# Patient Record
Sex: MALE | Race: WHITE | Employment: OTHER | URBAN - NONMETROPOLITAN AREA
[De-identification: names, ages, dates, MRNs, and addresses within clinical notes are randomized per-mention and may not be internally consistent; named-entity substitution may affect disease eponyms.]

---

## 2017-04-24 ENCOUNTER — OFFICE VISIT (OUTPATIENT)
Dept: PULMONOLOGY | Age: 82
End: 2017-04-24

## 2017-04-24 VITALS
BODY MASS INDEX: 22.28 KG/M2 | TEMPERATURE: 99.1 F | HEART RATE: 64 BPM | SYSTOLIC BLOOD PRESSURE: 130 MMHG | WEIGHT: 150.4 LBS | HEIGHT: 69 IN | DIASTOLIC BLOOD PRESSURE: 60 MMHG | OXYGEN SATURATION: 95 %

## 2017-04-24 DIAGNOSIS — R93.89 ABNORMAL CHEST X-RAY: ICD-10-CM

## 2017-04-24 DIAGNOSIS — J18.9 PNEUMONIA OF LEFT LOWER LOBE DUE TO INFECTIOUS ORGANISM: Primary | ICD-10-CM

## 2017-04-24 PROCEDURE — 99213 OFFICE O/P EST LOW 20 MIN: CPT | Performed by: INTERNAL MEDICINE

## 2017-04-24 PROCEDURE — 4040F PNEUMOC VAC/ADMIN/RCVD: CPT | Performed by: INTERNAL MEDICINE

## 2017-04-24 PROCEDURE — 1036F TOBACCO NON-USER: CPT | Performed by: INTERNAL MEDICINE

## 2017-04-24 PROCEDURE — 1123F ACP DISCUSS/DSCN MKR DOCD: CPT | Performed by: INTERNAL MEDICINE

## 2017-04-24 PROCEDURE — G8419 CALC BMI OUT NRM PARAM NOF/U: HCPCS | Performed by: INTERNAL MEDICINE

## 2017-04-24 PROCEDURE — G8427 DOCREV CUR MEDS BY ELIG CLIN: HCPCS | Performed by: INTERNAL MEDICINE

## 2017-04-24 PROCEDURE — G8598 ASA/ANTIPLAT THER USED: HCPCS | Performed by: INTERNAL MEDICINE

## 2017-09-14 ENCOUNTER — TELEPHONE (OUTPATIENT)
Dept: ADMINISTRATIVE | Age: 82
End: 2017-09-14

## 2017-12-18 ENCOUNTER — HOSPITAL ENCOUNTER (EMERGENCY)
Dept: GENERAL RADIOLOGY | Age: 82
Discharge: HOME | End: 2017-12-18
Payer: MEDICARE

## 2017-12-18 ENCOUNTER — HOSPITAL ENCOUNTER (EMERGENCY)
Age: 82
Discharge: HOME | End: 2017-12-18
Payer: MEDICARE

## 2017-12-18 VITALS
OXYGEN SATURATION: 94 % | TEMPERATURE: 97.4 F | DIASTOLIC BLOOD PRESSURE: 55 MMHG | BODY MASS INDEX: 20.62 KG/M2 | HEIGHT: 69 IN | RESPIRATION RATE: 16 BRPM | WEIGHT: 139.25 LBS | HEART RATE: 78 BPM | SYSTOLIC BLOOD PRESSURE: 108 MMHG

## 2017-12-18 DIAGNOSIS — J40 BRONCHITIS: Primary | ICD-10-CM

## 2017-12-18 DIAGNOSIS — R05.9 COUGH: ICD-10-CM

## 2017-12-18 PROCEDURE — 99214 OFFICE O/P EST MOD 30 MIN: CPT | Performed by: NURSE PRACTITIONER

## 2017-12-18 PROCEDURE — 71020 XR CHEST STANDARD TWO VW: CPT

## 2017-12-18 PROCEDURE — 99213 OFFICE O/P EST LOW 20 MIN: CPT

## 2017-12-18 RX ORDER — LEVOFLOXACIN 500 MG/1
500 TABLET, FILM COATED ORAL DAILY
Qty: 10 TABLET | Refills: 0 | Status: SHIPPED | OUTPATIENT
Start: 2017-12-18 | End: 2017-12-28

## 2017-12-18 RX ORDER — PREDNISONE 10 MG/1
10 TABLET ORAL DAILY
Qty: 5 TABLET | Refills: 0 | Status: SHIPPED | OUTPATIENT
Start: 2017-12-18 | End: 2017-12-23

## 2017-12-18 RX ORDER — ALBUTEROL SULFATE 90 UG/1
2 AEROSOL, METERED RESPIRATORY (INHALATION) EVERY 6 HOURS PRN
Qty: 1 INHALER | Refills: 1 | Status: SHIPPED | OUTPATIENT
Start: 2017-12-18 | End: 2020-03-02 | Stop reason: ALTCHOICE

## 2017-12-18 ASSESSMENT — ENCOUNTER SYMPTOMS
RHINORRHEA: 1
ABDOMINAL DISTENTION: 0
WHEEZING: 1
CONSTIPATION: 0
COUGH: 1
SHORTNESS OF BREATH: 1
COLOR CHANGE: 0
ANAL BLEEDING: 0
DIARRHEA: 0
BLOOD IN STOOL: 0
EYE REDNESS: 0
NAUSEA: 0
EYE DISCHARGE: 0
ABDOMINAL PAIN: 0
SORE THROAT: 0

## 2017-12-18 NOTE — ED TRIAGE NOTES
Pt to room 4 with c/o chest congestion, nasal congestion, and non productive cough. No known exposure.

## 2017-12-18 NOTE — ED PROVIDER NOTES
Loy August [age]  Urgent Care Encounter      CHIEF COMPLAINT       Chief Complaint   Patient presents with    Chest Congestion    Nasal Congestion    Cough     Nurses Notes reviewed and I agree except as noted in the HPI. HISTORY OF PRESENT ILLNESS   Jordan Hoffman is a [de-identified] y.o. male who presents With a 5 day history of a wet cough, productive of yellow and sometimes red mucus, chest congestion, wheezing, decreased appetite. Patient denies fever. Patient is a nonsmoker. Reports that he has had pneumonia 3 times in the past, last episode about a year ago. REVIEW OF SYSTEMS     Review of Systems   Constitutional: Positive for appetite change (decreased appetite ) and fatigue. Negative for chills and fever. HENT: Positive for congestion, postnasal drip and rhinorrhea. Negative for ear pain and sore throat. Eyes: Negative for discharge and redness. Respiratory: Positive for cough, shortness of breath and wheezing. Cardiovascular: Negative for chest pain and leg swelling. Gastrointestinal: Negative for abdominal distention, abdominal pain, anal bleeding, blood in stool, constipation, diarrhea and nausea. Skin: Negative for color change and rash. Neurological: Negative for facial asymmetry, speech difficulty and weakness. Hematological: Does not bruise/bleed easily. Psychiatric/Behavioral: Negative for agitation and confusion. PAST MEDICAL HISTORY         Diagnosis Date    Carotid stenosis, left     Cataract     Cataracts, bilateral     COPD (chronic obstructive pulmonary disease) ([address] 12/6/2016    Esophageal abnormality     GERD (gastroesophageal reflux disease)     H/O blood clots     Hearing loss     Hyperlipidemia     Hypertension 4/13/2014    Macular degeneration     Pneumonia     Skin cancer     removal    Stroke ([address]     TIA (transient ischemic attack)     Ulcer ([address]     Unspecified cerebral artery occlusion with cerebral infarction NOV. 2013  Unspecified sleep apnea     Wears dentures     FULL UPPER       SURGICAL HISTORY     Patient  has a past surgical history that includes Skin cancer excision; hc cath power picc triple (11/1/2013); and Carotid endarterectomy (01/13/2014). CURRENT MEDICATIONS       Previous Medications    ATORVASTATIN (LIPITOR) 20 MG TABLET    Take 20 mg by mouth daily. CLOPIDOGREL (PLAVIX) 75 MG TABLET    Take 1 po qd    FUROSEMIDE (LASIX) 40 MG TABLET    Take 0.5 tablets by mouth daily    LISINOPRIL (PRINIVIL;ZESTRIL) 5 MG TABLET    Take 1 tablet by mouth daily. METOPROLOL (LOPRESSOR) 25 MG TABLET    Take 1 tablet by mouth 2 times daily. MULTIPLE VITAMIN (MULTI-DAY VITAMINS PO)    Take 1 tablet by mouth daily. PANTOPRAZOLE (PROTONIX) 40 MG TABLET    Take 40 mg by mouth daily. POTASSIUM CHLORIDE (KLOR-CON M) 10 MEQ EXTENDED RELEASE TABLET    Take 1 tablet by mouth daily       ALLERGIES     Patient is has No Known Allergies. FAMILY HISTORY     Patient's family history includes Stroke in his mother. SOCIAL HISTORY     Patient  reports that he quit smoking about 17 years ago. His smoking use included Cigarettes. He started smoking about 66 years ago. He has a 80.00 pack-year smoking history. He has never used smokeless tobacco. He reports that he does not drink alcohol or use drugs. PHYSICAL EXAM     ED TRIAGE VITALS  BP: (!) 108/55, Temp: 97.4 °F (36.3 °C), Pulse: 78, Resp: 16, SpO2: 94 %  Physical Exam   Constitutional: He is oriented to person, place, and time. Vital signs are normal. He appears well-developed and well-nourished. He appears ill. No distress. HENT:   Head: Normocephalic and atraumatic. Right Ear: Hearing, tympanic membrane, external ear and ear canal normal. No drainage, swelling or tenderness. No decreased hearing is noted. Left Ear: Hearing, tympanic membrane, external ear and ear canal normal. No drainage, swelling or tenderness. No decreased hearing is noted.    Nose: Mucosal edema present. Mouth/Throat: Uvula is midline and mucous membranes are normal. Posterior oropharyngeal erythema present. Eyes: Conjunctivae and EOM are normal. Pupils are equal, round, and reactive to light. Right eye exhibits no discharge. Left eye exhibits no discharge. No scleral icterus. Neck: Normal range of motion. No thyromegaly present. Cardiovascular: Normal rate, regular rhythm and normal heart sounds. No murmur heard. Pulmonary/Chest: Effort normal. No accessory muscle usage. No respiratory distress. He has no decreased breath sounds. He has no wheezes. He has rhonchi (throughout). He has no rales. Abdominal: Soft. Bowel sounds are normal. He exhibits no distension. There is no hepatosplenomegaly. There is no tenderness. There is no CVA tenderness. Musculoskeletal: He exhibits no edema. Lymphadenopathy:        Head (right side): No submental, no submandibular, no tonsillar, no preauricular and no posterior auricular adenopathy present. Head (left side): No submental, no submandibular, no tonsillar, no preauricular and no posterior auricular adenopathy present. He has no cervical adenopathy. Right: No supraclavicular adenopathy present. Left: No supraclavicular adenopathy present. Neurological: He is alert and oriented to person, place, and time. No cranial nerve deficit. Skin: Skin is warm and dry. No rash noted. He is not diaphoretic. No erythema. Psychiatric: He has a normal mood and affect. His behavior is normal. Judgment and thought content normal. Cognition and memory are normal.   Nursing note and vitals reviewed. DIAGNOSTIC RESULTS   Labs:No results found for this visit on 12/18/17. IMAGING:    XR CHEST STANDARD (2 VW)   Final Result   1. Vascular clips left side of neck from prior surgery. Lungs very well inflated. 2. No acute infiltrates or effusions are seen.  Heart size normal.            **This report has been created using voice recognition software. It may contain minor errors which are inherent in voice recognition technology. **      Final report electronically signed by Dr. Jane Treviño on 12/18/2017 1:18 PM        URGENT CARE COURSE:     Decision to Discharge nontoxic, well-hydrated, normal airway. Patient is afebrile and stable. No airway abscess or epiglottitis, sepsis, CNS infection, pneumonia, hypoxia, bronchospasm. No ACS, CHF, PE    Patient was advised that the signs and symptoms are consistent with Bronchitis Will treat with Levaquin, Prednisone, and Albuterol. Can also use Tylenol and Motrin as directed for fever and pain. Frequent handwashing, He can do the nasal saline wash - Kimberly Pot for sinus congestion  Rest as much as possible  increased oral clear liquids, vaporizer, rest.       Avoid tobacco exposure,Take medication as directed,Drink Lots of fluids. Advised to follow up with family doctor in the next 5-7 days for reevaluation. The patient may return to urgent care if does not get better or symptoms worsen. Patient is advised to go to ER immediately if present symptoms worsen, high fever >102, vomiting, breathing difficulty, chest pain, lethargy or new symptoms develop. Patient understands this approach of home management and agrees to the treatment plan. The patient  was advised that at this point the patient can be treated safely at home and should be aware of following interventions and advised to the watch for the following:  #1. Any increasing pain not controlled with Motrin or Tylenol. #2. Any development of drainage from the ears redness of the auricle or posterior ear. #3. Development of the any fever chills, headache or stiffness of the neck the patient needs to be reevaluated by the primary care provider, return here or go to the emergency department for reevaluation. The patient is agreeable to the outpatient management at this time.  They are advised to follow-up with her primary care

## 2018-12-21 LAB
CHOLESTEROL, TOTAL: 121 MG/DL
CHOLESTEROL/HDL RATIO: NORMAL
HDLC SERPL-MCNC: 35 MG/DL (ref 35–70)
LDL CHOLESTEROL CALCULATED: 86 MG/DL (ref 0–160)
TRIGL SERPL-MCNC: 80 MG/DL
VLDLC SERPL CALC-MCNC: NORMAL MG/DL

## 2019-12-09 ENCOUNTER — HOSPITAL ENCOUNTER (OUTPATIENT)
Age: 84
Discharge: HOME | End: 2019-12-09
Payer: MEDICARE

## 2019-12-09 LAB
BASOPHILS # BLD: 1.3 %
BASOPHILS ABSOLUTE: 0.1 THOU/MM3 (ref 0–0.1)
EOSINOPHIL # BLD: 1.3 %
EOSINOPHILS ABSOLUTE: 0.1 THOU/MM3 (ref 0–0.4)
ERYTHROCYTE [DISTWIDTH] IN BLOOD BY AUTOMATED COUNT: 15.5 % (ref 11.5–14.5)
ERYTHROCYTE [DISTWIDTH] IN BLOOD BY AUTOMATED COUNT: 52.7 FL (ref 35–45)
HCT VFR BLD CALC: 41.9 % (ref 42–52)
HEMOGLOBIN: 12.6 GM/DL (ref 14–18)
IMMATURE GRANS (ABS): 0.09 THOU/MM3 (ref 0–0.07)
IMMATURE GRANULOCYTES: 1.9 %
LYMPHOCYTES # BLD: 47.7 %
LYMPHOCYTES ABSOLUTE: 2.3 THOU/MM3 (ref 1–4.8)
MCH RBC QN AUTO: 27.8 PG (ref 26–33)
MCHC RBC AUTO-ENTMCNC: 30.1 GM/DL (ref 32.2–35.5)
MCV RBC AUTO: 92.5 FL (ref 80–94)
MONOCYTES # BLD: 33 %
MONOCYTES ABSOLUTE: 1.6 THOU/MM3 (ref 0.4–1.3)
NUCLEATED RED BLOOD CELLS: 0 /100 WBC
PLATELET # BLD: 190 THOU/MM3 (ref 130–400)
PMV BLD AUTO: 12.9 FL (ref 9.4–12.4)
RBC # BLD: 4.53 MILL/MM3 (ref 4.7–6.1)
SEG NEUTROPHILS: 14.8 %
SEGMENTED NEUTROPHILS ABSOLUTE COUNT: 0.7 THOU/MM3 (ref 1.8–7.7)
WBC # BLD: 4.8 THOU/MM3 (ref 4.8–10.8)

## 2019-12-09 PROCEDURE — 85025 COMPLETE CBC W/AUTO DIFF WBC: CPT

## 2019-12-09 PROCEDURE — 36415 COLL VENOUS BLD VENIPUNCTURE: CPT

## 2020-03-02 ENCOUNTER — OFFICE VISIT (OUTPATIENT)
Dept: FAMILY MEDICINE CLINIC | Age: 85
End: 2020-03-02
Payer: MEDICARE

## 2020-03-02 ENCOUNTER — TELEPHONE (OUTPATIENT)
Dept: FAMILY MEDICINE CLINIC | Age: 85
End: 2020-03-02

## 2020-03-02 VITALS
HEIGHT: 68 IN | SYSTOLIC BLOOD PRESSURE: 124 MMHG | DIASTOLIC BLOOD PRESSURE: 68 MMHG | RESPIRATION RATE: 12 BRPM | BODY MASS INDEX: 20.31 KG/M2 | WEIGHT: 134 LBS | TEMPERATURE: 97.9 F | HEART RATE: 60 BPM

## 2020-03-02 PROBLEM — D46.9 MYELODYSPLASTIC SYNDROME (HCC): Status: ACTIVE | Noted: 2020-03-02

## 2020-03-02 PROBLEM — E72.11 HYPERHOMOCYSTEINEMIA (HCC): Status: ACTIVE | Noted: 2020-03-02

## 2020-03-02 PROBLEM — K21.9 GASTROESOPHAGEAL REFLUX DISEASE: Status: ACTIVE | Noted: 2020-03-02

## 2020-03-02 PROCEDURE — 1036F TOBACCO NON-USER: CPT | Performed by: FAMILY MEDICINE

## 2020-03-02 PROCEDURE — G8420 CALC BMI NORM PARAMETERS: HCPCS | Performed by: FAMILY MEDICINE

## 2020-03-02 PROCEDURE — 99203 OFFICE O/P NEW LOW 30 MIN: CPT | Performed by: FAMILY MEDICINE

## 2020-03-02 PROCEDURE — 4040F PNEUMOC VAC/ADMIN/RCVD: CPT | Performed by: FAMILY MEDICINE

## 2020-03-02 PROCEDURE — G8484 FLU IMMUNIZE NO ADMIN: HCPCS | Performed by: FAMILY MEDICINE

## 2020-03-02 PROCEDURE — 1123F ACP DISCUSS/DSCN MKR DOCD: CPT | Performed by: FAMILY MEDICINE

## 2020-03-02 PROCEDURE — G8427 DOCREV CUR MEDS BY ELIG CLIN: HCPCS | Performed by: FAMILY MEDICINE

## 2020-03-02 RX ORDER — PANTOPRAZOLE SODIUM 40 MG/1
40 TABLET, DELAYED RELEASE ORAL DAILY
Qty: 90 TABLET | Refills: 3 | Status: SHIPPED | OUTPATIENT
Start: 2020-03-02 | End: 2020-11-23 | Stop reason: ALTCHOICE

## 2020-03-02 RX ORDER — FOLIC ACID 1 MG/1
1 TABLET ORAL DAILY
Qty: 90 TABLET | Refills: 3 | Status: SHIPPED | OUTPATIENT
Start: 2020-03-02 | End: 2021-04-16 | Stop reason: SDUPTHER

## 2020-03-02 RX ORDER — LISINOPRIL 5 MG/1
5 TABLET ORAL DAILY
Qty: 90 TABLET | Refills: 3 | Status: SHIPPED | OUTPATIENT
Start: 2020-03-02 | End: 2021-03-23

## 2020-03-02 RX ORDER — FOLIC ACID 1 MG/1
1 TABLET ORAL DAILY
COMMUNITY
End: 2020-03-02 | Stop reason: SDUPTHER

## 2020-03-02 RX ORDER — CLOPIDOGREL BISULFATE 75 MG/1
TABLET ORAL
Qty: 90 TABLET | Refills: 3 | Status: ON HOLD | OUTPATIENT
Start: 2020-03-02 | End: 2020-07-15

## 2020-03-02 ASSESSMENT — ENCOUNTER SYMPTOMS
BLOOD IN STOOL: 0
SHORTNESS OF BREATH: 0
ANAL BLEEDING: 0
VOMITING: 0
NAUSEA: 0
CHEST TIGHTNESS: 0
DIARRHEA: 0
CONSTIPATION: 0
ABDOMINAL PAIN: 0

## 2020-03-02 ASSESSMENT — PATIENT HEALTH QUESTIONNAIRE - PHQ9
2. FEELING DOWN, DEPRESSED OR HOPELESS: 0
SUM OF ALL RESPONSES TO PHQ QUESTIONS 1-9: 0
1. LITTLE INTEREST OR PLEASURE IN DOING THINGS: 0
SUM OF ALL RESPONSES TO PHQ QUESTIONS 1-9: 0
SUM OF ALL RESPONSES TO PHQ9 QUESTIONS 1 & 2: 0

## 2020-03-02 NOTE — TELEPHONE ENCOUNTER
DOLMARCELA today.   Pt had a whooping cough vaccine 10/18/18 at [address] [place] [address]

## 2020-03-02 NOTE — PATIENT INSTRUCTIONS
Encouraged annual FLU VACCINE- pt declines (updated 3/2/2020)  Pt/ wife to track down date of previous TETANUS SHOT (TdaP/ ADACEL/ BOOSTRIX) and update office with info. Encouraged NEW SHINGLES SHOT [facility])- pt declines at this time.  (updated 3/2/2020)  Will hold on RAUL/ PSA at this time due to age and pt preference. COLONOSCOPY done in past per Dr. Riri Palomares- will track down report. After discussion with pt and wife, will refer to Dr. Derick Braun at this time to [address] [place] [address] for history of Myelodysplastic Syndrome. Check FLP and BMP at this time. Continue current medicines  Refills. Check Carotid Dopplers at this time. Follow up in 3 months for re-evaluation.

## 2020-03-02 NOTE — PROGRESS NOTES
[facility]  [facility]  Dept: [phone]  Dept Fax: [phone]  MAIA Ball is a 82 y.o.male    Pt presents to establish PCP- previous provider was Gerard Elizabeth CNP. Date last seen by provider- 14-15 months ago. Pt seeing Dr. Deneen Sanchez at [facility] currently for history of Myelodysplastic Syndrome. Current medical problems include:  Patient Active Problem List   Diagnosis    Brain TIA    Cerebral infarction ([address]    Hemiparesis, right ([address]    Hypertension    History of CVA (cerebrovascular accident)    Hyperhomocysteinemia ([address]    Gastroesophageal reflux disease    Myelodysplastic syndrome ([address]       Past Medical History:   Diagnosis Date    Carotid stenosis, left     Cataract     Cataracts, bilateral     Esophageal abnormality     GERD (gastroesophageal reflux disease)     H/O blood clots     Hearing loss     Hyperlipidemia     Hypertension 4/13/2014    Macular degeneration     Pneumonia     Skin cancer     removal    Stroke ([address]     TIA (transient ischemic attack)     Ulcer     Unspecified cerebral artery occlusion with cerebral infarction NOV. 2013    Unspecified sleep apnea     Wears dentures     FULL UPPER       Past Surgical History:   Procedure Laterality Date    [facility]         SKIN CANCER EXCISION  2010    Basal Cell Carcinoma- Dr. Elsa Richards       No Known Allergies       Current Outpatient Medications:     folic acid (FOLVITE) 1 MG tablet, Take 1 tablet by mouth daily, Disp: 90 tablet, Rfl: 3    clopidogrel (PLAVIX) 75 MG tablet, Take 1 po qd, Disp: 90 tablet, Rfl: 3    lisinopril (PRINIVIL;ZESTRIL) 5 MG tablet, Take 1 tablet by mouth daily, Disp: 90 tablet, Rfl: 3    metoprolol tartrate (LOPRESSOR) 25 MG tablet, Take 1 tablet by mouth 2 times daily, Disp: 180 tablet, Rfl: 3    pantoprazole (PROTONIX) 40 MG tablet, Take 1 pantoprazole (PROTONIX) 40 MG tablet   2. Hyperhomocysteinemia (HCC)  folic acid (FOLVITE) 1 MG tablet   3. Myelodysplastic syndrome [facility])  Ole Carter MD, Oncology, [facility] [address]   4. Hyperlipidemia, unspecified hyperlipidemia type     5. Cerebral infarction, unspecified mechanism (HCC)  clopidogrel (PLAVIX) 75 MG tablet   6. Essential hypertension  lisinopril (PRINIVIL;ZESTRIL) 5 MG tablet    metoprolol tartrate (LOPRESSOR) 25 MG tablet    Lipid Panel    Basic Metabolic Panel   7. Right carotid bruit  VL DUP CAROTID BILATERAL     PLAN     Encouraged annual FLU VACCINE- pt declines (updated 3/2/2020)  Pt/ wife to track down date of previous TETANUS SHOT (TdaP/ ADACEL/ BOOSTRIX) and update office with info. Encouraged NEW SHINGLES SHOT [facility])- pt declines at this time.  (updated 3/2/2020)  Will hold on RAUL/ PSA at this time due to age and pt preference. COLONOSCOPY done in past per Dr. Tidwell June- will track down report. After discussion with pt and wife, will refer to Dr. Jonathan Duval at this time to [address] [place] [address] for history of Myelodysplastic Syndrome. Check FLP and BMP at this time. Continue current medicines  Refills. Check Carotid Dopplers at this time. Follow up in 3 months for re-evaluation.          Electronically signed by Broderick Colindres MD on 3/2/2020 at 12:20 PM

## 2020-03-09 ENCOUNTER — HOSPITAL ENCOUNTER (OUTPATIENT)
Dept: INTERVENTIONAL RADIOLOGY/VASCULAR | Age: 85
Discharge: HOME | End: 2020-03-09
Payer: MEDICARE

## 2020-03-09 PROCEDURE — 93880 EXTRACRANIAL BILAT STUDY: CPT

## 2020-03-10 ENCOUNTER — TELEPHONE (OUTPATIENT)
Dept: FAMILY MEDICINE CLINIC | Age: 85
End: 2020-03-10

## 2020-03-11 NOTE — TELEPHONE ENCOUNTER
Spoke to the pts wife and she is agreeable to the referral to Dr. Kris Barney. Referral placed in Epic, pts wife will call to schedule.

## 2020-03-11 NOTE — TELEPHONE ENCOUNTER
Spoke to the pts wife and notified her of the ultrasound result and ES recommendations. She stated she is agreeable to the referral to cardiothoracic surgery but prefers to have a physician that is affiliated with [address] [place] [address] so the pt can go there for any procedures. Please advise if there are any other options. (Called Dr. Lynn Handler office and verified that he doesn't have privileges at [address] [place] [address]).

## 2020-03-19 ENCOUNTER — TELEPHONE (OUTPATIENT)
Dept: CARDIOTHORACIC SURGERY | Age: 85
End: 2020-03-19

## 2020-03-19 NOTE — TELEPHONE ENCOUNTER
Dr. Brittany Huertas requested that patient have CTA Neck prior to appt on 3/30/20 due to severe carotid stenosis dx. CTA Neck scheduled for 3/25/20 at 1:20 at [facility]. I informed patient's wife, Sue Capone, and she voiced understanding.

## 2020-03-25 ENCOUNTER — HOSPITAL ENCOUNTER (OUTPATIENT)
Dept: CT IMAGING | Age: 85
Discharge: HOME | End: 2020-03-25
Payer: MEDICARE

## 2020-03-25 LAB — POC CREATININE WHOLE BLOOD: 1.5 MG/DL (ref 0.5–1.2)

## 2020-03-25 PROCEDURE — 6360000004 HC RX CONTRAST MEDICATION: Performed by: THORACIC SURGERY (CARDIOTHORACIC VASCULAR SURGERY)

## 2020-03-25 PROCEDURE — 70498 CT ANGIOGRAPHY NECK: CPT

## 2020-03-25 PROCEDURE — 82565 ASSAY OF CREATININE: CPT

## 2020-03-25 RX ADMIN — IOPAMIDOL 90 ML: 755 INJECTION, SOLUTION INTRAVENOUS at 14:14

## 2020-03-27 ENCOUNTER — HOSPITAL ENCOUNTER (OUTPATIENT)
Age: 85
Discharge: HOME | End: 2020-03-27
Payer: MEDICARE

## 2020-03-27 LAB
CREAT SERPL-MCNC: 1.7 MG/DL (ref 0.4–1.2)
GFR SERPL CREATININE-BSD FRML MDRD: 39 ML/MIN/1.73M2

## 2020-03-27 PROCEDURE — 36415 COLL VENOUS BLD VENIPUNCTURE: CPT

## 2020-03-27 PROCEDURE — 82565 ASSAY OF CREATININE: CPT

## 2020-05-04 ENCOUNTER — TELEPHONE (OUTPATIENT)
Dept: CARDIOTHORACIC SURGERY | Age: 85
End: 2020-05-04

## 2020-05-04 ENCOUNTER — OFFICE VISIT (OUTPATIENT)
Dept: CARDIOTHORACIC SURGERY | Age: 85
End: 2020-05-04
Payer: MEDICARE

## 2020-05-04 VITALS
HEIGHT: 68 IN | DIASTOLIC BLOOD PRESSURE: 72 MMHG | WEIGHT: 134.26 LBS | BODY MASS INDEX: 20.35 KG/M2 | SYSTOLIC BLOOD PRESSURE: 174 MMHG | TEMPERATURE: 96.9 F | HEART RATE: 54 BPM

## 2020-05-04 PROCEDURE — 1123F ACP DISCUSS/DSCN MKR DOCD: CPT | Performed by: THORACIC SURGERY (CARDIOTHORACIC VASCULAR SURGERY)

## 2020-05-04 PROCEDURE — 1036F TOBACCO NON-USER: CPT | Performed by: THORACIC SURGERY (CARDIOTHORACIC VASCULAR SURGERY)

## 2020-05-04 PROCEDURE — 99204 OFFICE O/P NEW MOD 45 MIN: CPT | Performed by: THORACIC SURGERY (CARDIOTHORACIC VASCULAR SURGERY)

## 2020-05-04 PROCEDURE — G8420 CALC BMI NORM PARAMETERS: HCPCS | Performed by: THORACIC SURGERY (CARDIOTHORACIC VASCULAR SURGERY)

## 2020-05-04 PROCEDURE — 4040F PNEUMOC VAC/ADMIN/RCVD: CPT | Performed by: THORACIC SURGERY (CARDIOTHORACIC VASCULAR SURGERY)

## 2020-05-04 PROCEDURE — G8427 DOCREV CUR MEDS BY ELIG CLIN: HCPCS | Performed by: THORACIC SURGERY (CARDIOTHORACIC VASCULAR SURGERY)

## 2020-05-04 NOTE — PROGRESS NOTES
Conjunctivae/corneas clear. Neck: Supple, with full range of motion. No jugular venous distention. Trachea midline. Positive for bruit over the right carotid artery. Well-healed scar in the left neck from carotid endarterectomy  Respiratory:  Normal respiratory effort. Clear to auscultation, bilaterally without rales/wheezes/rhonchi. Cardiovascular:  Regular rate and rhythm with normal S1/S2 without murmurs, rubs or gallops. Abdomen: Soft, non-tender, non-distended with normal bowel sounds. Musculoskeletal:  No clubbing, cyanosis or edema bilaterally. Full range of motion without deformity. Skin: Skin color, texture, turgor normal.  No rashes or lesions. Neurologic:   Right arm weakness  Psychiatric:  Alert and oriented, thought content appropriate, normal insight. Capillary Refill: Brisk,< 3 seconds   Peripheral Pulses: +2 palpable, equal bilaterally. Labs:    CBC:  Lab Results   Component Value Date    WBC 4.8 12/09/2019    HGB 12.6 12/09/2019    HCT 41.9 12/09/2019    MCV 92.5 12/09/2019     12/09/2019    PROTIME 10.6 01/13/2014    INR 0.92 12/05/2016     BMP:   Lab Results   Component Value Date     12/07/2016    K 3.4 12/07/2016     12/07/2016    CO2 23 12/07/2016    PHOS 2.3 07/24/2014    BUN 13 12/07/2016    CREATININE 1.7 03/27/2020    MG 2.2 07/24/2014       Imaging  I have reviewed all imaging.       Problem List:  Patient Active Problem List   Diagnosis    Brain TIA    Cerebral infarction ([address]    Hemiparesis, right ([address]    Hypertension    History of CVA (cerebrovascular accident)    Hyperhomocysteinemia ([address]    Gastroesophageal reflux disease    Myelodysplastic syndrome ([address]       Assessment: Severe right carotid artery stenosis, status post left carotid endarterectomy, and status post a left hemispheric CVA with residual right arm weakness    Plan 5/4/20:  1) 2D echocardiogram, EKG, and cardiac stress test for clearance  2) needs clearance for carotid

## 2020-05-11 ENCOUNTER — TELEPHONE (OUTPATIENT)
Dept: CARDIOLOGY CLINIC | Age: 85
End: 2020-05-11

## 2020-05-11 ENCOUNTER — OFFICE VISIT (OUTPATIENT)
Dept: CARDIOLOGY CLINIC | Age: 85
End: 2020-05-11
Payer: MEDICARE

## 2020-05-11 VITALS
HEIGHT: 68 IN | WEIGHT: 140 LBS | SYSTOLIC BLOOD PRESSURE: 120 MMHG | HEART RATE: 58 BPM | DIASTOLIC BLOOD PRESSURE: 68 MMHG | BODY MASS INDEX: 21.22 KG/M2

## 2020-05-11 PROCEDURE — 4040F PNEUMOC VAC/ADMIN/RCVD: CPT | Performed by: NUCLEAR MEDICINE

## 2020-05-11 PROCEDURE — 1123F ACP DISCUSS/DSCN MKR DOCD: CPT | Performed by: NUCLEAR MEDICINE

## 2020-05-11 PROCEDURE — 1036F TOBACCO NON-USER: CPT | Performed by: NUCLEAR MEDICINE

## 2020-05-11 PROCEDURE — 93000 ELECTROCARDIOGRAM COMPLETE: CPT | Performed by: NUCLEAR MEDICINE

## 2020-05-11 PROCEDURE — 99204 OFFICE O/P NEW MOD 45 MIN: CPT | Performed by: NUCLEAR MEDICINE

## 2020-05-11 PROCEDURE — G8427 DOCREV CUR MEDS BY ELIG CLIN: HCPCS | Performed by: NUCLEAR MEDICINE

## 2020-05-11 PROCEDURE — G8420 CALC BMI NORM PARAMETERS: HCPCS | Performed by: NUCLEAR MEDICINE

## 2020-05-11 ASSESSMENT — ENCOUNTER SYMPTOMS
PHOTOPHOBIA: 0
BACK PAIN: 1
COLOR CHANGE: 0
ANAL BLEEDING: 0
DIARRHEA: 0
RECTAL PAIN: 0
NAUSEA: 0
BLOOD IN STOOL: 0
ABDOMINAL DISTENTION: 0
VOMITING: 0
CONSTIPATION: 0
CHEST TIGHTNESS: 0
ABDOMINAL PAIN: 0
SHORTNESS OF BREATH: 1

## 2020-05-11 NOTE — PROGRESS NOTES
Pre-op exam  EKG 12 Lead   2. Familial hypercholesterolemia     3. Essential hypertension     4. PVD (peripheral vascular disease) (HCC)     higher risk for CAD  No established CAD  Here for clearance   ECG in office was done today. I reviewed the ECG. No acute findings    Plan:  No follow-ups on file. Discussed  Non invasive cardiac testing will be ordered to further evaluate for any ischemic or structural heart disease as a cause of the patient symptoms. We will proceed with a Stress Cardiolite test and echo soon. Continue risk factor modification and medical management  Thank you for allowing me to participate in the care of your patient. Please don't hesitate to contact me regarding any further issues related to the patient care    Orders Placed:  Orders Placed This Encounter   Procedures    EKG 12 Lead     Order Specific Question:   Reason for Exam?     Answer: Other       Medications Prescribed:  No orders of the defined types were placed in this encounter. Discussed use, benefit, and side effects of prescribed medications. All patient questions answered. Pt voicedunderstanding. Instructed to continue current medications, diet and exercise. Continue risk factor modification and medical management. Patient agreed with treatment plan. Follow up as directed.     Electronically signedby Renetta Santos MD on 5/11/2020 at 12:16 PM

## 2020-05-11 NOTE — TELEPHONE ENCOUNTER
Justine with :Antonio Bradley-  Patients wife    Patient is scheduled May 21, 2020  Echo at 915am   [address]    Instructions have been mailed to the patient.

## 2020-05-21 ENCOUNTER — HOSPITAL ENCOUNTER (OUTPATIENT)
Dept: NON INVASIVE DIAGNOSTICS | Age: 85
Discharge: HOME | End: 2020-05-21
Payer: MEDICARE

## 2020-05-21 VITALS — WEIGHT: 140 LBS | HEIGHT: 68 IN | BODY MASS INDEX: 21.22 KG/M2

## 2020-05-21 LAB
LV EF: 60 %
LVEF MODALITY: NORMAL

## 2020-05-21 PROCEDURE — 93017 CV STRESS TEST TRACING ONLY: CPT | Performed by: NUCLEAR MEDICINE

## 2020-05-21 PROCEDURE — 3430000000 HC RX DIAGNOSTIC RADIOPHARMACEUTICAL: Performed by: NUCLEAR MEDICINE

## 2020-05-21 PROCEDURE — 93018 CV STRESS TEST I&R ONLY: CPT | Performed by: NUCLEAR MEDICINE

## 2020-05-21 PROCEDURE — 6360000002 HC RX W HCPCS

## 2020-05-21 PROCEDURE — 93016 CV STRESS TEST SUPVJ ONLY: CPT | Performed by: NUCLEAR MEDICINE

## 2020-05-21 PROCEDURE — A9500 TC99M SESTAMIBI: HCPCS | Performed by: NUCLEAR MEDICINE

## 2020-05-21 PROCEDURE — 78452 HT MUSCLE IMAGE SPECT MULT: CPT | Performed by: NUCLEAR MEDICINE

## 2020-05-21 PROCEDURE — 93306 TTE W/DOPPLER COMPLETE: CPT

## 2020-05-21 PROCEDURE — 78452 HT MUSCLE IMAGE SPECT MULT: CPT

## 2020-05-21 RX ADMIN — Medication 9 MILLICURIE: at 10:40

## 2020-05-21 RX ADMIN — Medication 28.5 MILLICURIE: at 12:17

## 2020-05-22 ENCOUNTER — TELEPHONE (OUTPATIENT)
Dept: CARDIOLOGY CLINIC | Age: 85
End: 2020-05-22

## 2020-06-03 ENCOUNTER — TELEPHONE (OUTPATIENT)
Dept: CARDIOTHORACIC SURGERY | Age: 85
End: 2020-06-03

## 2020-07-07 ENCOUNTER — PREP FOR PROCEDURE (OUTPATIENT)
Dept: CARDIOTHORACIC SURGERY | Age: 85
End: 2020-07-07

## 2020-07-07 RX ORDER — SODIUM CHLORIDE 0.9 % (FLUSH) 0.9 %
10 SYRINGE (ML) INJECTION PRN
Status: CANCELLED | OUTPATIENT
Start: 2020-07-07

## 2020-07-07 RX ORDER — SODIUM CHLORIDE 0.9 % (FLUSH) 0.9 %
10 SYRINGE (ML) INJECTION EVERY 12 HOURS SCHEDULED
Status: CANCELLED | OUTPATIENT
Start: 2020-07-07

## 2020-07-08 NOTE — PROGRESS NOTES
PAT call attempted, patient unavailable, left message to please call us back at your earliest convenience; [phone]

## 2020-07-09 NOTE — PROGRESS NOTES
Covid screening questionnaire complete and negative for symptoms or exposure see chart for documentation.   Please limit your exposure to the public after you have your covid test  Please call your doctor immediately if you develop any symptoms of covid prior to your surgery

## 2020-07-09 NOTE — PROGRESS NOTES
PAT call attempted, patient unavailable, left message to please call us back at your earliest convenience; [phone]

## 2020-07-09 NOTE — PROGRESS NOTES
NPO after midnight  [employer] and drivers license  Wear comfortable clean clothing  Do not bring jewelry   Shower night before and morning of surgery with a liquid antibacterial soap  Bring medications in original bottles  Follow all instructions give by your physician   needed at discharge  Call [phone] for any questions

## 2020-07-09 NOTE — PROGRESS NOTES
In preparation for their surgical procedure above patient was screened for Obstructive Sleep Apnea (MIRANDA) using the STOP-Bang Questionnaire by the Pre-Admission Testing department. This is a pre-surgical screening tool for patient safety and serves as a recommendation, this WILL NOT cause cancellation of surgery. STOP-Bang Questionnaire  * Do you currently see a pulmonologist?  No     If yes STOP, do not complete. Patient follows with  .    1. Do you snore loudly (able to be heard in the next room)? No    2. Do you often feel tired or sleepy during the daytime? No       3. Has anyone ever told you that you stop breathing during your sleep? No    4. Do you have or are you being treated for high blood pressure? Yes      5. BMI more than 35? BMI (Calculated): 20.6        No    6. Age over 48 years? 82 y.o. Yes    7. Neck Circumference greater than 17 inches for male or 16 inches for female? Measured           (visits only)            Not Applicable    8. Gender Male? Yes      TOTAL SCORE: 3    MIRANDA - Low Risk : Yes to 0 - 2 questions  MIRANDA - Intermediate Risk : Yes to 3 - 4 questions  MIRANDA - High Risk : Yes to 5 - 8 questions    Adapted from:   STOP Questionnaire: A Tool to Screen Patients for Obstructive Sleep Apnea   BABITA Zhao.C.P.C., Reed Givens, M.B.B.S., Aiyana Kinsey M.D., Essence Thomason. Ashly Paniagua, Ph.D., KAMINI Davidson.B.B.S., Sangeeta Metcalf M.Sc., Ghazala Castillo M.D., Daysi Narayan. RAE MarkC.P.C.    Anesthesiology 2008; 659:811-05 Copyright 2008, the [address] of [employer]esthesiologists, tono 37.   ----------------------------------------------------------------------------------------------------------------

## 2020-07-10 ENCOUNTER — HOSPITAL ENCOUNTER (OUTPATIENT)
Age: 85
Discharge: HOME | End: 2020-07-10
Payer: COMMERCIAL

## 2020-07-10 PROCEDURE — U0002 COVID-19 LAB TEST NON-CDC: HCPCS

## 2020-07-11 LAB
PERFORMING LAB: NORMAL
REPORT: NORMAL
SARS-COV-2: NOT DETECTED

## 2020-07-14 ENCOUNTER — ANESTHESIA EVENT (OUTPATIENT)
Dept: OPERATING ROOM | Age: 85
DRG: 037 | End: 2020-07-14
Payer: MEDICARE

## 2020-07-15 ENCOUNTER — HOSPITAL ENCOUNTER (INPATIENT)
Age: 85
LOS: 1 days | Discharge: HOME | DRG: 037 | End: 2020-07-16
Attending: THORACIC SURGERY (CARDIOTHORACIC VASCULAR SURGERY) | Admitting: THORACIC SURGERY (CARDIOTHORACIC VASCULAR SURGERY)
Payer: MEDICARE

## 2020-07-15 ENCOUNTER — ANESTHESIA (OUTPATIENT)
Dept: OPERATING ROOM | Age: 85
DRG: 037 | End: 2020-07-15
Payer: MEDICARE

## 2020-07-15 VITALS
DIASTOLIC BLOOD PRESSURE: 57 MMHG | SYSTOLIC BLOOD PRESSURE: 121 MMHG | TEMPERATURE: 93.7 F | RESPIRATION RATE: 2 BRPM | OXYGEN SATURATION: 100 %

## 2020-07-15 PROBLEM — Z86.79 H/O CAROTID STENOSIS: Status: ACTIVE | Noted: 2020-07-15

## 2020-07-15 LAB
ABO: NORMAL
ANION GAP SERPL CALCULATED.3IONS-SCNC: 11 MEQ/L (ref 8–16)
ANTIBODY SCREEN: NORMAL
APTT: 29.2 SECONDS (ref 22–38)
BUN BLDV-MCNC: 29 MG/DL (ref 7–22)
CALCIUM SERPL-MCNC: 9.3 MG/DL (ref 8.5–10.5)
CHLORIDE BLD-SCNC: 108 MEQ/L (ref 98–111)
CO2: 19 MEQ/L (ref 23–33)
CREAT SERPL-MCNC: 1.8 MG/DL (ref 0.4–1.2)
ERYTHROCYTE [DISTWIDTH] IN BLOOD BY AUTOMATED COUNT: 15.9 % (ref 11.5–14.5)
ERYTHROCYTE [DISTWIDTH] IN BLOOD BY AUTOMATED COUNT: 51.6 FL (ref 35–45)
GFR SERPL CREATININE-BSD FRML MDRD: 36 ML/MIN/1.73M2
GLUCOSE BLD-MCNC: 102 MG/DL (ref 70–108)
HCT VFR BLD CALC: 41.5 % (ref 42–52)
HEMOGLOBIN: 13 GM/DL (ref 14–18)
INR BLD: 0.98 (ref 0.85–1.13)
MCH RBC QN AUTO: 27.5 PG (ref 26–33)
MCHC RBC AUTO-ENTMCNC: 31.3 GM/DL (ref 32.2–35.5)
MCV RBC AUTO: 87.9 FL (ref 80–94)
MRSA SCREEN RT-PCR: NEGATIVE
PLATELET # BLD: 123 THOU/MM3 (ref 130–400)
PMV BLD AUTO: 12.5 FL (ref 9.4–12.4)
POTASSIUM REFLEX MAGNESIUM: 4.7 MEQ/L (ref 3.5–5.2)
RBC # BLD: 4.72 MILL/MM3 (ref 4.7–6.1)
RH FACTOR: NORMAL
SODIUM BLD-SCNC: 138 MEQ/L (ref 135–145)
VANCOMYCIN RESISTANT ENTEROCOCCUS: NEGATIVE
WBC # BLD: 4.8 THOU/MM3 (ref 4.8–10.8)

## 2020-07-15 PROCEDURE — 03CH0ZZ EXTIRPATION OF MATTER FROM RIGHT COMMON CAROTID ARTERY, OPEN APPROACH: ICD-10-PCS | Performed by: THORACIC SURGERY (CARDIOTHORACIC VASCULAR SURGERY)

## 2020-07-15 PROCEDURE — 87086 URINE CULTURE/COLONY COUNT: CPT

## 2020-07-15 PROCEDURE — 6370000000 HC RX 637 (ALT 250 FOR IP): Performed by: ANESTHESIOLOGY

## 2020-07-15 PROCEDURE — 94760 N-INVAS EAR/PLS OXIMETRY 1: CPT

## 2020-07-15 PROCEDURE — 2709999900 HC NON-CHARGEABLE SUPPLY

## 2020-07-15 PROCEDURE — 3700000001 HC ADD 15 MINUTES (ANESTHESIA): Performed by: THORACIC SURGERY (CARDIOTHORACIC VASCULAR SURGERY)

## 2020-07-15 PROCEDURE — 86901 BLOOD TYPING SEROLOGIC RH(D): CPT

## 2020-07-15 PROCEDURE — 87081 CULTURE SCREEN ONLY: CPT

## 2020-07-15 PROCEDURE — 85730 THROMBOPLASTIN TIME PARTIAL: CPT

## 2020-07-15 PROCEDURE — 6370000000 HC RX 637 (ALT 250 FOR IP): Performed by: PHYSICIAN ASSISTANT

## 2020-07-15 PROCEDURE — 94640 AIRWAY INHALATION TREATMENT: CPT

## 2020-07-15 PROCEDURE — 86900 BLOOD TYPING SEROLOGIC ABO: CPT

## 2020-07-15 PROCEDURE — 2709999900 HC NON-CHARGEABLE SUPPLY: Performed by: THORACIC SURGERY (CARDIOTHORACIC VASCULAR SURGERY)

## 2020-07-15 PROCEDURE — 2580000003 HC RX 258: Performed by: THORACIC SURGERY (CARDIOTHORACIC VASCULAR SURGERY)

## 2020-07-15 PROCEDURE — 6370000000 HC RX 637 (ALT 250 FOR IP): Performed by: NURSE ANESTHETIST, CERTIFIED REGISTERED

## 2020-07-15 PROCEDURE — 7100000000 HC PACU RECOVERY - FIRST 15 MIN: Performed by: THORACIC SURGERY (CARDIOTHORACIC VASCULAR SURGERY)

## 2020-07-15 PROCEDURE — 03CK0ZZ EXTIRPATION OF MATTER FROM RIGHT INTERNAL CAROTID ARTERY, OPEN APPROACH: ICD-10-PCS | Performed by: THORACIC SURGERY (CARDIOTHORACIC VASCULAR SURGERY)

## 2020-07-15 PROCEDURE — 3600000014 HC SURGERY LEVEL 4 ADDTL 15MIN: Performed by: THORACIC SURGERY (CARDIOTHORACIC VASCULAR SURGERY)

## 2020-07-15 PROCEDURE — 80048 BASIC METABOLIC PNL TOTAL CA: CPT

## 2020-07-15 PROCEDURE — 2580000003 HC RX 258: Performed by: PHYSICIAN ASSISTANT

## 2020-07-15 PROCEDURE — 36415 COLL VENOUS BLD VENIPUNCTURE: CPT

## 2020-07-15 PROCEDURE — 35301 RECHANNELING OF ARTERY: CPT | Performed by: THORACIC SURGERY (CARDIOTHORACIC VASCULAR SURGERY)

## 2020-07-15 PROCEDURE — 03UK0KZ SUPPLEMENT RIGHT INTERNAL CAROTID ARTERY WITH NONAUTOLOGOUS TISSUE SUBSTITUTE, OPEN APPROACH: ICD-10-PCS | Performed by: THORACIC SURGERY (CARDIOTHORACIC VASCULAR SURGERY)

## 2020-07-15 PROCEDURE — 2000000000 HC ICU R&B

## 2020-07-15 PROCEDURE — 6360000002 HC RX W HCPCS: Performed by: THORACIC SURGERY (CARDIOTHORACIC VASCULAR SURGERY)

## 2020-07-15 PROCEDURE — 2720000010 HC SURG SUPPLY STERILE: Performed by: THORACIC SURGERY (CARDIOTHORACIC VASCULAR SURGERY)

## 2020-07-15 PROCEDURE — 85610 PROTHROMBIN TIME: CPT

## 2020-07-15 PROCEDURE — 3600000004 HC SURGERY LEVEL 4 BASE: Performed by: THORACIC SURGERY (CARDIOTHORACIC VASCULAR SURGERY)

## 2020-07-15 PROCEDURE — 2500000003 HC RX 250 WO HCPCS

## 2020-07-15 PROCEDURE — 86850 RBC ANTIBODY SCREEN: CPT

## 2020-07-15 PROCEDURE — 3700000000 HC ANESTHESIA ATTENDED CARE: Performed by: THORACIC SURGERY (CARDIOTHORACIC VASCULAR SURGERY)

## 2020-07-15 PROCEDURE — C1768 GRAFT, VASCULAR: HCPCS | Performed by: THORACIC SURGERY (CARDIOTHORACIC VASCULAR SURGERY)

## 2020-07-15 PROCEDURE — 6360000002 HC RX W HCPCS: Performed by: NURSE ANESTHETIST, CERTIFIED REGISTERED

## 2020-07-15 PROCEDURE — 88304 TISSUE EXAM BY PATHOLOGIST: CPT

## 2020-07-15 PROCEDURE — 35301 RECHANNELING OF ARTERY: CPT | Performed by: PHYSICIAN ASSISTANT

## 2020-07-15 PROCEDURE — 7100000001 HC PACU RECOVERY - ADDTL 15 MIN: Performed by: THORACIC SURGERY (CARDIOTHORACIC VASCULAR SURGERY)

## 2020-07-15 PROCEDURE — 85027 COMPLETE CBC AUTOMATED: CPT

## 2020-07-15 PROCEDURE — 2500000003 HC RX 250 WO HCPCS: Performed by: NURSE ANESTHETIST, CERTIFIED REGISTERED

## 2020-07-15 PROCEDURE — 87500 VANOMYCIN DNA AMP PROBE: CPT

## 2020-07-15 PROCEDURE — 87147 CULTURE TYPE IMMUNOLOGIC: CPT

## 2020-07-15 PROCEDURE — 2580000003 HC RX 258: Performed by: NURSE ANESTHETIST, CERTIFIED REGISTERED

## 2020-07-15 PROCEDURE — 87641 MR-STAPH DNA AMP PROBE: CPT

## 2020-07-15 DEVICE — XENOSURE BIOLOGIC PATCH, 0.8CM X 8CM, EIFU
Type: IMPLANTABLE DEVICE | Status: FUNCTIONAL
Brand: XENOSURE BIOLOGIC PATCH

## 2020-07-15 RX ORDER — FENTANYL CITRATE 50 UG/ML
50 INJECTION, SOLUTION INTRAMUSCULAR; INTRAVENOUS EVERY 5 MIN PRN
Status: DISCONTINUED | OUTPATIENT
Start: 2020-07-15 | End: 2020-07-15 | Stop reason: HOSPADM

## 2020-07-15 RX ORDER — LABETALOL HYDROCHLORIDE 5 MG/ML
INJECTION, SOLUTION INTRAVENOUS PRN
Status: DISCONTINUED | OUTPATIENT
Start: 2020-07-15 | End: 2020-07-15 | Stop reason: SDUPTHER

## 2020-07-15 RX ORDER — PANTOPRAZOLE SODIUM 40 MG/1
40 TABLET, DELAYED RELEASE ORAL
Status: DISCONTINUED | OUTPATIENT
Start: 2020-07-15 | End: 2020-07-16 | Stop reason: HOSPADM

## 2020-07-15 RX ORDER — IPRATROPIUM BROMIDE AND ALBUTEROL SULFATE 2.5; .5 MG/3ML; MG/3ML
1 SOLUTION RESPIRATORY (INHALATION)
Status: DISCONTINUED | OUTPATIENT
Start: 2020-07-15 | End: 2020-07-16 | Stop reason: HOSPADM

## 2020-07-15 RX ORDER — FENTANYL CITRATE 50 UG/ML
INJECTION, SOLUTION INTRAMUSCULAR; INTRAVENOUS PRN
Status: DISCONTINUED | OUTPATIENT
Start: 2020-07-15 | End: 2020-07-15 | Stop reason: SDUPTHER

## 2020-07-15 RX ORDER — MEPERIDINE HYDROCHLORIDE 25 MG/ML
12.5 INJECTION INTRAMUSCULAR; INTRAVENOUS; SUBCUTANEOUS EVERY 5 MIN PRN
Status: DISCONTINUED | OUTPATIENT
Start: 2020-07-15 | End: 2020-07-15 | Stop reason: HOSPADM

## 2020-07-15 RX ORDER — PROPOFOL 10 MG/ML
INJECTION, EMULSION INTRAVENOUS PRN
Status: DISCONTINUED | OUTPATIENT
Start: 2020-07-15 | End: 2020-07-15 | Stop reason: SDUPTHER

## 2020-07-15 RX ORDER — PHENYLEPHRINE HYDROCHLORIDE 10 MG/ML
INJECTION INTRAVENOUS PRN
Status: DISCONTINUED | OUTPATIENT
Start: 2020-07-15 | End: 2020-07-15 | Stop reason: SDUPTHER

## 2020-07-15 RX ORDER — FOLIC ACID 1 MG/1
1 TABLET ORAL DAILY
Status: DISCONTINUED | OUTPATIENT
Start: 2020-07-15 | End: 2020-07-16 | Stop reason: HOSPADM

## 2020-07-15 RX ORDER — SODIUM CHLORIDE 9 MG/ML
INJECTION, SOLUTION INTRAVENOUS CONTINUOUS
Status: DISCONTINUED | OUTPATIENT
Start: 2020-07-15 | End: 2020-07-16 | Stop reason: HOSPADM

## 2020-07-15 RX ORDER — LABETALOL 20 MG/4 ML (5 MG/ML) INTRAVENOUS SYRINGE
5 EVERY 10 MIN PRN
Status: DISCONTINUED | OUTPATIENT
Start: 2020-07-15 | End: 2020-07-15 | Stop reason: HOSPADM

## 2020-07-15 RX ORDER — PROTAMINE SULFATE 10 MG/ML
INJECTION, SOLUTION INTRAVENOUS PRN
Status: DISCONTINUED | OUTPATIENT
Start: 2020-07-15 | End: 2020-07-15 | Stop reason: SDUPTHER

## 2020-07-15 RX ORDER — LIDOCAINE HYDROCHLORIDE 40 MG/ML
SOLUTION TOPICAL PRN
Status: DISCONTINUED | OUTPATIENT
Start: 2020-07-15 | End: 2020-07-15 | Stop reason: SDUPTHER

## 2020-07-15 RX ORDER — LIDOCAINE HCL/PF 100 MG/5ML
SYRINGE (ML) INJECTION PRN
Status: DISCONTINUED | OUTPATIENT
Start: 2020-07-15 | End: 2020-07-15 | Stop reason: SDUPTHER

## 2020-07-15 RX ORDER — PROMETHAZINE HYDROCHLORIDE 25 MG/1
12.5 TABLET ORAL EVERY 6 HOURS PRN
Status: DISCONTINUED | OUTPATIENT
Start: 2020-07-15 | End: 2020-07-16 | Stop reason: HOSPADM

## 2020-07-15 RX ORDER — ACETAMINOPHEN 325 MG/1
650 TABLET ORAL EVERY 4 HOURS PRN
Status: DISCONTINUED | OUTPATIENT
Start: 2020-07-15 | End: 2020-07-16 | Stop reason: HOSPADM

## 2020-07-15 RX ORDER — ROCURONIUM BROMIDE 10 MG/ML
INJECTION, SOLUTION INTRAVENOUS PRN
Status: DISCONTINUED | OUTPATIENT
Start: 2020-07-15 | End: 2020-07-15 | Stop reason: SDUPTHER

## 2020-07-15 RX ORDER — SODIUM CHLORIDE 0.9 % (FLUSH) 0.9 %
10 SYRINGE (ML) INJECTION PRN
Status: DISCONTINUED | OUTPATIENT
Start: 2020-07-15 | End: 2020-07-15 | Stop reason: HOSPADM

## 2020-07-15 RX ORDER — DEXAMETHASONE SODIUM PHOSPHATE 4 MG/ML
INJECTION, SOLUTION INTRA-ARTICULAR; INTRALESIONAL; INTRAMUSCULAR; INTRAVENOUS; SOFT TISSUE PRN
Status: DISCONTINUED | OUTPATIENT
Start: 2020-07-15 | End: 2020-07-15 | Stop reason: SDUPTHER

## 2020-07-15 RX ORDER — ONDANSETRON 2 MG/ML
4 INJECTION INTRAMUSCULAR; INTRAVENOUS
Status: DISCONTINUED | OUTPATIENT
Start: 2020-07-15 | End: 2020-07-15 | Stop reason: HOSPADM

## 2020-07-15 RX ORDER — LISINOPRIL 5 MG/1
5 TABLET ORAL DAILY
Status: DISCONTINUED | OUTPATIENT
Start: 2020-07-16 | End: 2020-07-16 | Stop reason: HOSPADM

## 2020-07-15 RX ORDER — SODIUM CHLORIDE 0.9 % (FLUSH) 0.9 %
10 SYRINGE (ML) INJECTION PRN
Status: DISCONTINUED | OUTPATIENT
Start: 2020-07-15 | End: 2020-07-16 | Stop reason: HOSPADM

## 2020-07-15 RX ORDER — SODIUM CHLORIDE 0.9 % (FLUSH) 0.9 %
10 SYRINGE (ML) INJECTION EVERY 12 HOURS SCHEDULED
Status: DISCONTINUED | OUTPATIENT
Start: 2020-07-15 | End: 2020-07-16 | Stop reason: HOSPADM

## 2020-07-15 RX ORDER — HEPARIN SODIUM 1000 [USP'U]/ML
INJECTION, SOLUTION INTRAVENOUS; SUBCUTANEOUS PRN
Status: DISCONTINUED | OUTPATIENT
Start: 2020-07-15 | End: 2020-07-15 | Stop reason: SDUPTHER

## 2020-07-15 RX ORDER — ONDANSETRON 2 MG/ML
INJECTION INTRAMUSCULAR; INTRAVENOUS PRN
Status: DISCONTINUED | OUTPATIENT
Start: 2020-07-15 | End: 2020-07-15 | Stop reason: SDUPTHER

## 2020-07-15 RX ORDER — SODIUM CHLORIDE 0.9 % (FLUSH) 0.9 %
10 SYRINGE (ML) INJECTION EVERY 12 HOURS SCHEDULED
Status: DISCONTINUED | OUTPATIENT
Start: 2020-07-15 | End: 2020-07-15 | Stop reason: HOSPADM

## 2020-07-15 RX ORDER — ONDANSETRON 2 MG/ML
4 INJECTION INTRAMUSCULAR; INTRAVENOUS EVERY 6 HOURS PRN
Status: DISCONTINUED | OUTPATIENT
Start: 2020-07-15 | End: 2020-07-16 | Stop reason: HOSPADM

## 2020-07-15 RX ADMIN — CEFAZOLIN 2 G: 1 INJECTION, POWDER, FOR SOLUTION INTRAMUSCULAR; INTRAVENOUS; PARENTERAL at 08:06

## 2020-07-15 RX ADMIN — DEXAMETHASONE SODIUM PHOSPHATE 4 MG: 4 INJECTION, SOLUTION INTRAMUSCULAR; INTRAVENOUS at 08:33

## 2020-07-15 RX ADMIN — FOLIC ACID 1 MG: 1 TABLET ORAL at 14:30

## 2020-07-15 RX ADMIN — ONDANSETRON HYDROCHLORIDE 4 MG: 4 INJECTION, SOLUTION INTRAMUSCULAR; INTRAVENOUS at 08:33

## 2020-07-15 RX ADMIN — IPRATROPIUM BROMIDE AND ALBUTEROL SULFATE 1 AMPULE: .5; 3 SOLUTION RESPIRATORY (INHALATION) at 16:58

## 2020-07-15 RX ADMIN — ROCURONIUM BROMIDE 30 MG: 10 INJECTION INTRAVENOUS at 08:02

## 2020-07-15 RX ADMIN — PHENYLEPHRINE HYDROCHLORIDE 50 MCG: 10 INJECTION INTRAVENOUS at 11:41

## 2020-07-15 RX ADMIN — HEPARIN SODIUM 5000 UNITS: 1000 INJECTION, SOLUTION INTRAVENOUS; SUBCUTANEOUS at 09:54

## 2020-07-15 RX ADMIN — PROTAMINE SULFATE 20 MG: 10 INJECTION, SOLUTION INTRAVENOUS at 11:18

## 2020-07-15 RX ADMIN — IPRATROPIUM BROMIDE AND ALBUTEROL SULFATE 1 AMPULE: .5; 3 SOLUTION RESPIRATORY (INHALATION) at 07:33

## 2020-07-15 RX ADMIN — LABETALOL HYDROCHLORIDE 5 MG: 5 INJECTION INTRAVENOUS at 12:10

## 2020-07-15 RX ADMIN — IPRATROPIUM BROMIDE AND ALBUTEROL SULFATE 1 AMPULE: .5; 3 SOLUTION RESPIRATORY (INHALATION) at 20:40

## 2020-07-15 RX ADMIN — ROCURONIUM BROMIDE 20 MG: 10 INJECTION INTRAVENOUS at 09:30

## 2020-07-15 RX ADMIN — SODIUM CHLORIDE: 9 INJECTION, SOLUTION INTRAVENOUS at 19:06

## 2020-07-15 RX ADMIN — PROTAMINE SULFATE 25 MG: 10 INJECTION, SOLUTION INTRAVENOUS at 11:06

## 2020-07-15 RX ADMIN — PROPOFOL 100 MG: 10 INJECTION, EMULSION INTRAVENOUS at 08:02

## 2020-07-15 RX ADMIN — SODIUM CHLORIDE: 9 INJECTION, SOLUTION INTRAVENOUS at 10:50

## 2020-07-15 RX ADMIN — ACETAMINOPHEN 650 MG: 325 TABLET ORAL at 17:42

## 2020-07-15 RX ADMIN — PHENYLEPHRINE HYDROCHLORIDE 100 MCG: 10 INJECTION INTRAVENOUS at 09:35

## 2020-07-15 RX ADMIN — FENTANYL CITRATE 50 MCG: 50 INJECTION, SOLUTION INTRAMUSCULAR; INTRAVENOUS at 08:02

## 2020-07-15 RX ADMIN — PHENYLEPHRINE HYDROCHLORIDE 100 MCG: 10 INJECTION INTRAVENOUS at 09:58

## 2020-07-15 RX ADMIN — ROCURONIUM BROMIDE 20 MG: 10 INJECTION INTRAVENOUS at 08:25

## 2020-07-15 RX ADMIN — FENTANYL CITRATE 25 MCG: 50 INJECTION, SOLUTION INTRAMUSCULAR; INTRAVENOUS at 08:44

## 2020-07-15 RX ADMIN — ROCURONIUM BROMIDE 10 MG: 10 INJECTION INTRAVENOUS at 10:16

## 2020-07-15 RX ADMIN — PHENYLEPHRINE HYDROCHLORIDE 50 MCG: 10 INJECTION INTRAVENOUS at 11:22

## 2020-07-15 RX ADMIN — PANTOPRAZOLE SODIUM 40 MG: 40 TABLET, DELAYED RELEASE ORAL at 14:30

## 2020-07-15 RX ADMIN — Medication 60 MG: at 08:02

## 2020-07-15 RX ADMIN — SODIUM CHLORIDE: 9 INJECTION, SOLUTION INTRAVENOUS at 06:35

## 2020-07-15 RX ADMIN — SUGAMMADEX 200 MG: 100 INJECTION, SOLUTION INTRAVENOUS at 11:53

## 2020-07-15 RX ADMIN — FENTANYL CITRATE 25 MCG: 50 INJECTION, SOLUTION INTRAMUSCULAR; INTRAVENOUS at 09:43

## 2020-07-15 RX ADMIN — IPRATROPIUM BROMIDE AND ALBUTEROL SULFATE 1 AMPULE: .5; 3 SOLUTION RESPIRATORY (INHALATION) at 13:50

## 2020-07-15 RX ADMIN — LIDOCAINE HYDROCHLORIDE 4 ML: 40 SOLUTION TOPICAL at 08:02

## 2020-07-15 RX ADMIN — METOPROLOL TARTRATE 25 MG: 25 TABLET ORAL at 19:52

## 2020-07-15 RX ADMIN — PROPOFOL 40 MG: 10 INJECTION, EMULSION INTRAVENOUS at 08:48

## 2020-07-15 RX ADMIN — SODIUM CHLORIDE, PRESERVATIVE FREE 10 ML: 5 INJECTION INTRAVENOUS at 19:53

## 2020-07-15 ASSESSMENT — PULMONARY FUNCTION TESTS
PIF_VALUE: 14
PIF_VALUE: 15
PIF_VALUE: 4
PIF_VALUE: 14
PIF_VALUE: 13
PIF_VALUE: 14
PIF_VALUE: 14
PIF_VALUE: 3
PIF_VALUE: 18
PIF_VALUE: 14
PIF_VALUE: 1
PIF_VALUE: 14
PIF_VALUE: 17
PIF_VALUE: 16
PIF_VALUE: 15
PIF_VALUE: 14
PIF_VALUE: 1
PIF_VALUE: 14
PIF_VALUE: 14
PIF_VALUE: 1
PIF_VALUE: 17
PIF_VALUE: 15
PIF_VALUE: 14
PIF_VALUE: 16
PIF_VALUE: 14
PIF_VALUE: 14
PIF_VALUE: 16
PIF_VALUE: 15
PIF_VALUE: 14
PIF_VALUE: 16
PIF_VALUE: 14
PIF_VALUE: 3
PIF_VALUE: 3
PIF_VALUE: 14
PIF_VALUE: 15
PIF_VALUE: 14
PIF_VALUE: 1
PIF_VALUE: 4
PIF_VALUE: 17
PIF_VALUE: 15
PIF_VALUE: 17
PIF_VALUE: 17
PIF_VALUE: 16
PIF_VALUE: 14
PIF_VALUE: 17
PIF_VALUE: 17
PIF_VALUE: 15
PIF_VALUE: 14
PIF_VALUE: 14
PIF_VALUE: 16
PIF_VALUE: 14
PIF_VALUE: 15
PIF_VALUE: 15
PIF_VALUE: 16
PIF_VALUE: 14
PIF_VALUE: 14
PIF_VALUE: 6
PIF_VALUE: 18
PIF_VALUE: 17
PIF_VALUE: 14
PIF_VALUE: 14
PIF_VALUE: 1
PIF_VALUE: 14
PIF_VALUE: 16
PIF_VALUE: 14
PIF_VALUE: 16
PIF_VALUE: 14
PIF_VALUE: 15
PIF_VALUE: 14
PIF_VALUE: 16
PIF_VALUE: 14
PIF_VALUE: 17
PIF_VALUE: 14
PIF_VALUE: 15
PIF_VALUE: 13
PIF_VALUE: 16
PIF_VALUE: 14
PIF_VALUE: 9
PIF_VALUE: 14
PIF_VALUE: 16
PIF_VALUE: 15
PIF_VALUE: 14
PIF_VALUE: 16
PIF_VALUE: 14
PIF_VALUE: 14
PIF_VALUE: 15
PIF_VALUE: 14
PIF_VALUE: 14
PIF_VALUE: 17
PIF_VALUE: 14
PIF_VALUE: 14
PIF_VALUE: 15
PIF_VALUE: 2
PIF_VALUE: 20
PIF_VALUE: 15
PIF_VALUE: 16
PIF_VALUE: 14
PIF_VALUE: 1
PIF_VALUE: 11
PIF_VALUE: 14
PIF_VALUE: 14
PIF_VALUE: 13
PIF_VALUE: 14
PIF_VALUE: 1
PIF_VALUE: 14
PIF_VALUE: 15
PIF_VALUE: 14
PIF_VALUE: 17
PIF_VALUE: 14
PIF_VALUE: 13
PIF_VALUE: 16
PIF_VALUE: 17
PIF_VALUE: 14
PIF_VALUE: 16
PIF_VALUE: 14
PIF_VALUE: 15
PIF_VALUE: 18
PIF_VALUE: 15
PIF_VALUE: 14
PIF_VALUE: 15
PIF_VALUE: 16
PIF_VALUE: 14
PIF_VALUE: 17
PIF_VALUE: 4
PIF_VALUE: 14
PIF_VALUE: 15
PIF_VALUE: 14
PIF_VALUE: 15
PIF_VALUE: 15
PIF_VALUE: 17
PIF_VALUE: 14
PIF_VALUE: 16
PIF_VALUE: 15
PIF_VALUE: 4
PIF_VALUE: 16
PIF_VALUE: 14
PIF_VALUE: 16
PIF_VALUE: 15
PIF_VALUE: 17
PIF_VALUE: 7
PIF_VALUE: 14
PIF_VALUE: 1
PIF_VALUE: 1
PIF_VALUE: 14
PIF_VALUE: 16
PIF_VALUE: 17
PIF_VALUE: 15
PIF_VALUE: 16
PIF_VALUE: 14
PIF_VALUE: 3
PIF_VALUE: 14
PIF_VALUE: 15
PIF_VALUE: 14
PIF_VALUE: 15
PIF_VALUE: 14
PIF_VALUE: 0
PIF_VALUE: 14
PIF_VALUE: 14
PIF_VALUE: 13
PIF_VALUE: 14
PIF_VALUE: 16
PIF_VALUE: 14
PIF_VALUE: 14
PIF_VALUE: 15
PIF_VALUE: 17
PIF_VALUE: 14
PIF_VALUE: 4
PIF_VALUE: 14
PIF_VALUE: 6
PIF_VALUE: 14
PIF_VALUE: 15
PIF_VALUE: 14
PIF_VALUE: 16
PIF_VALUE: 15
PIF_VALUE: 15
PIF_VALUE: 14
PIF_VALUE: 16
PIF_VALUE: 14
PIF_VALUE: 15
PIF_VALUE: 16
PIF_VALUE: 15
PIF_VALUE: 15
PIF_VALUE: 1
PIF_VALUE: 16
PIF_VALUE: 15
PIF_VALUE: 15
PIF_VALUE: 1
PIF_VALUE: 9
PIF_VALUE: 16
PIF_VALUE: 14
PIF_VALUE: 14
PIF_VALUE: 15
PIF_VALUE: 14
PIF_VALUE: 18
PIF_VALUE: 14
PIF_VALUE: 15
PIF_VALUE: 17
PIF_VALUE: 14
PIF_VALUE: 16
PIF_VALUE: 15
PIF_VALUE: 17
PIF_VALUE: 15
PIF_VALUE: 14
PIF_VALUE: 15
PIF_VALUE: 15
PIF_VALUE: 3
PIF_VALUE: 15
PIF_VALUE: 14
PIF_VALUE: 15

## 2020-07-15 ASSESSMENT — PAIN SCALES - GENERAL
PAINLEVEL_OUTOF10: 0
PAINLEVEL_OUTOF10: 3
PAINLEVEL_OUTOF10: 0
PAINLEVEL_OUTOF10: 0

## 2020-07-15 ASSESSMENT — ENCOUNTER SYMPTOMS: SHORTNESS OF BREATH: 1

## 2020-07-15 NOTE — OP NOTE
Protamine was given to reverse the heparin. Complete hemostasis was then obtained. One Carlos-Fan drain was inserted. The incision was closed in layers using absorbable sutures. The skin was also closed with absorbable sutures in a subcuticular fashion. An occlusive dressing was applied. He tolerated the procedure well. He woke up and follows command. He moves all extremities. He was extubated in the operating room and was transferred to the recovery room in a stable condition.         Electronically signed by Smiley Brantley MD on 7/15/2020 at 1:28 PM

## 2020-07-15 NOTE — PROGRESS NOTES
1215-Patient to PACU. Report received from  [address] and Esau Murry. Patient responds to voice. IV infusing, no redness or swelling at site noted. On 4L NC. VSS  1530-Patient resting quietly in bed. Denies pain or discomfort. VSS  1544-Patient resting quietly in bed. Denies pain or discomfort. VSS  1551-Report called to [employer]. Patient resting quietly in bed. Denies pain or discomfort. VSS  1300-Patient meets DC criteria. Resting quietly in bed. Denies pain or discomfort. VSS. Report has been called and family has been notified. Patient to 4D in stable condition.

## 2020-07-15 NOTE — ANESTHESIA PRE PROCEDURE
Department of Anesthesiology  Preprocedure Note       Name:  Barrera Johnson   Age:  82 y.o.  :  1935                                          MRN:  [id number]         Date:  7/15/2020      Surgeon: Skyler Krishnan):  Vilma Moran MD    Procedure: Procedure(s):  RIGHT CAROTID ENDARTERECTOMY    Medications prior to admission:   Prior to Admission medications    Medication Sig Start Date End Date Taking?  Authorizing Provider   folic acid (FOLVITE) 1 MG tablet Take 1 tablet by mouth daily 3/2/20  Yes Herminia Mitchell MD   clopidogrel (PLAVIX) 75 MG tablet Take 1 po qd 3/2/20  Yes Herminia Mitchell MD   lisinopril (PRINIVIL;ZESTRIL) 5 MG tablet Take 1 tablet by mouth daily 3/2/20  Yes Herminia Mitchell MD   metoprolol tartrate (LOPRESSOR) 25 MG tablet Take 1 tablet by mouth 2 times daily  Patient taking differently: Take 25 mg by mouth 2 times daily Takes at lunch and dinner 3/2/20  Yes Herminia Mitchell MD   pantoprazole (PROTONIX) 40 MG tablet Take 1 tablet by mouth daily  Patient taking differently: Take 40 mg by mouth Daily with lunch  3/2/20  Yes Herminia Mitchell MD       Current medications:    Current Facility-Administered Medications   Medication Dose Route Frequency Provider Last Rate Last Dose    sodium chloride flush 0.9 % injection 10 mL  10 mL Intravenous 2 times per day [place] Cue, PA-C        sodium chloride flush 0.9 % injection 10 mL  10 mL Intravenous PRN [place] Harika, PA-C        0.9 % sodium chloride infusion   Intravenous Continuous Vilma Moran  mL/hr at 07/15/20 5469         Allergies:  No Known Allergies    Problem List:    Patient Active Problem List   Diagnosis Code    Brain TIA G45.9    Cerebral infarction ([address] I63.9    Hemiparesis, right ([address] G81.91    Hypertension I10    History of CVA (cerebrovascular accident) Z80.78    Hyperhomocysteinemia ([address] E72.11    Gastroesophageal reflux disease K21.9    Myelodysplastic syndrome ([address] D46.9    H/O carotid stenosis Z86.79 Past Medical History:        Diagnosis Date    Carotid stenosis, left     Cataract     Cataracts, bilateral     Esophageal abnormality     GERD (gastroesophageal reflux disease)     H/O blood clots     Hearing loss     Hypertension 2014    Macular degeneration     Pneumonia     Skin cancer     removal    Stroke [facility]     TIA (transient ischemic attack)     Ulcer     Unspecified cerebral artery occlusion with cerebral infarction 2013    Unspecified sleep apnea     Wears dentures     FULL UPPER       Past Surgical History:        Procedure Laterality Date    CAROTID ENDARTERECTOMY  2014    [facility]    CATARACT REMOVAL      HC CATH POWER PICC TRIPLE  2013         SKIN CANCER EXCISION      Basal Cell Carcinoma- Dr. Nallely Dunlap TUNNELED CENTRAL VENOUS CATHETER W/ SUBCUTANEOUS PORT         Social History:    Social History     Tobacco Use    Smoking status: Former Smoker     Packs/day: 2.00     Years: 40.00     Pack years: 80.00     Types: Cigarettes     Start date: 1951     Last attempt to quit: 1/10/2000     Years since quittin.5    Smokeless tobacco: Never Used   Substance Use Topics    Alcohol use: No     Alcohol/week: 0.0 standard drinks                                Counseling given: Not Answered      Vital Signs (Current):   Vitals:    20 1142 07/15/20 0554   BP:  (!) 176/70   Pulse:  53   Resp:  16   Temp:  36.2 °C (97.1 °F)   TempSrc:  Temporal   SpO2:  93%   Weight: 135 lb (61.2 kg) 135 lb (61.2 kg)   Height: 5' 8\" (1.727 m) 5' 8\" (1.727 m)                                              BP Readings from Last 3 Encounters:   07/15/20 (!) 176/70   20 120/68   20 (!) 174/72       NPO Status: Time of last liquid consumption: 1800                        Time of last solid consumption: 1800                        Date of last liquid consumption: 20                        Date of last solid food consumption: 20    BMI:   Wt Readings from Last 3 Encounters:   07/15/20 135 lb (61.2 kg)   05/21/20 140 lb (63.5 kg)   05/11/20 140 lb (63.5 kg)     Body mass index is 20.53 kg/m². CBC:   Lab Results   Component Value Date    WBC 4.8 07/15/2020    RBC 4.72 07/15/2020    HGB 13.0 07/15/2020    HCT 41.5 07/15/2020    MCV 87.9 07/15/2020    RDW 15.4 12/07/2016     07/15/2020       CMP:   Lab Results   Component Value Date     07/15/2020    K 4.7 07/15/2020     07/15/2020    CO2 19 07/15/2020    BUN 29 07/15/2020    CREATININE 1.8 07/15/2020    GFRAA >60 01/14/2014    LABGLOM 36 07/15/2020    GLUCOSE 102 07/15/2020    PROT 6.8 12/05/2016    CALCIUM 9.3 07/15/2020    BILITOT 0.3 12/05/2016    ALKPHOS 81 12/05/2016    AST 23 12/05/2016    ALT 20 12/05/2016       POC Tests: No results for input(s): POCGLU, POCNA, POCK, POCCL, POCBUN, POCHEMO, POCHCT in the last 72 hours.     Coags:   Lab Results   Component Value Date    PROTIME 10.6 01/13/2014    INR 0.98 07/15/2020    APTT 29.2 07/15/2020       HCG (If Applicable): No results found for: PREGTESTUR, PREGSERUM, HCG, HCGQUANT     ABGs: No results found for: PHART, PO2ART, IYU0FOJ, JZE0GLC, BEART, V4EDWHYB     Type & Screen (If Applicable):  No results found for: LABABO, LABRH    Drug/Infectious Status (If Applicable):  No results found for: HIV, HEPCAB    COVID-19 Screening (If Applicable):   Lab Results   Component Value Date    COVID19 NOT DETECTED 07/10/2020         Anesthesia Evaluation  Patient summary reviewed and Nursing notes reviewed no history of anesthetic complications:   Airway: Mallampati: II  TM distance: >3 FB   Neck ROM: limited  Mouth opening: > = 3 FB Dental:    (+) edentulous      Pulmonary:   (+) pneumonia:  shortness of breath: chronic,  sleep apnea:  rhonchi,  wheezes,                            ROS comment: Previous smoker     Cardiovascular:  Exercise tolerance: poor (<4 METS),   (+) hypertension:,       ECG reviewed      Echocardiogram reviewed  Stress test reviewed       Beta Blocker:  Dose within 24 Hrs         Neuro/Psych:   (+) CVA: residual symptoms, TIA,             GI/Hepatic/Renal:   (+) GERD:,           Endo/Other:                      ROS comment: Myelodysplastic syndrome    Abdominal:           Vascular: negative vascular ROS. Anesthesia Plan      general     ASA 3       Induction: intravenous. arterial line and BIS  MIPS: Postoperative opioids intended and Prophylactic antiemetics administered. Anesthetic plan and risks discussed with patient. Plan discussed with attending. JOHNSON Barajas - CRNA   7/15/2020        DOS STAFF ADDENDUM:    Chart reviewed. Pt seen and examined. Plan as above, discussed with CRNA. Discussed risks, benefits, alternatives, and personnel involved with patient, who agrees to proceed.       Madeleine Room, DO  Staff Anesthesiologist  8:33 AM

## 2020-07-15 NOTE — H&P
CT/CV Surgery History and Physical       Patient's Name/Date of Birth: Titi Grover / 1935 (34 y.o.)      PCP: Shelbie Kennedy MD    Date: July 15, 2020     CC: Titi rGover is going to be admitted for right carotid endarterectomy scheduled for today. Rip Calix HPI:  He is a 82years old male with a history of CVA in the past with mild residual weakness in the right side. He was found to have a severe right carotid artery stenosis CTA and carotid duplex study. He is status post left carotid endarterectomy in 2014. PastMedical History:  Charlesetta Alpers  has a past medical history of Carotid stenosis, left, Cataract, Cataracts, bilateral, Esophageal abnormality, GERD (gastroesophageal reflux disease), H/O blood clots, Hearing loss, Hypertension, Macular degeneration, Pneumonia, Skin cancer, Stroke ([facility] [address] TIA (transient ischemic attack), Ulcer, Unspecified cerebral artery occlusion with cerebral infarction, Unspecified sleep apnea, and Wears dentures. Past Surgical History:  The patient  has a past surgical history that includes Skin cancer excision (2010); hc cath power picc triple (11/1/2013); Carotid endarterectomy (01/13/2014); TUNNELED CENTRAL VENOUS CATHETER W/ SUBCUTANEOUS PORT; and Cataract removal.    Allergies: The patient has No Known Allergies.     Medications:    Current Facility-Administered Medications:     sodium chloride flush 0.9 % injection 10 mL, 10 mL, Intravenous, 2 times per day, LUANN De La Cruz-MIREYA    sodium chloride flush 0.9 % injection 10 mL, 10 mL, Intravenous, PRN, Darby Valenzuela PA-C    0.9 % sodium chloride infusion, , Intravenous, Continuous, Mackenzie Mcelroy MD, Last Rate: 100 mL/hr at 07/15/20 0635    ipratropium-albuterol (DUONEB) nebulizer solution 1 ampule, 1 ampule, Inhalation, Q4H WA, Moses Bee DO, 1 ampule at 07/15/20 0733    ceFAZolin (ANCEF) IVPB, , , ,     labetalol (NORMODYNE;TRANDATE) injection syringe 5 mg, 5 mg, Intravenous, Q10 Min PRN, Yola Holger Cristal, DO    meperidine (DEMEROL) injection 12.5 mg, 12.5 mg, Intravenous, Q5 Min PRN, Moses Bee DO    HYDROmorphone (DILAUDID) injection 0.25 mg, 0.25 mg, Intravenous, Q5 Min PRN, Moses Bee, DO    fentaNYL (SUBLIMAZE) injection 50 mcg, 50 mcg, Intravenous, Q5 Min PRN, Moses Bee, DO    ondansetron (ZOFRAN) injection 4 mg, 4 mg, Intravenous, Once PRN, Yola Bee, DO    sodium chloride 0.9 % 250 mL with heparin (porcine) 2,500 Units, , , PRN, Mackenzie Mcelroy MD, 250 mL at 07/15/20 0943    Family History: This patient's family history includes Breast Cancer in his sister; No Known Problems in his father, mother, and sister. Social History:  Charlesetta Alpers  reports that he quit smoking about 20 years ago. His smoking use included cigarettes. He started smoking about 69 years ago. He has a 80.00 pack-year smoking history. He has never used smokeless tobacco. He reports that he does not drink alcohol or use drugs. Vital Signs:   BP (!) 146/65   Pulse 50   Temp 97.4 °F (36.3 °C) (Temporal)   Resp 15   Ht 5' 8\" (1.727 m)   Wt 135 lb (61.2 kg)   SpO2 99%   BMI 20.53 kg/m²     ROS:  Constitutional: Negative for activity change, chills, fatigue, fever and unexpected weight change. HENT: Negative for congestion, facial swelling, sore throat, and changes in voice. Eyes: Negative for photophobia, redness, itching and visual disturbance. Respiratory: Negative for apnea, choking, shortness of breath, wheezing and stridor. Cardiovascular: Negative for chest pain, palpitations and leg swelling. Gastrointestinal: Negative for abdominal distention, constipation, nausea and vomiting. Endocrine: Negative for cold intolerance, heat intolerance, polyphagia and polyuria. Musculoskeletal: Mild weakness on the right side extremity. .  Skin: Negative for color change, rash and wound. Allergic/Immunologic: Negative for food allergies and immunocompromised state.    Neurological: Negative for dizziness, tremors, speech difficulty, weakness, numbness and headaches. Hematological: Negative for adenopathy. Does not bruise/bleed easily. Psychiatric/Behavioral: Negative for agitation, confusion, and dysphoric mood. Physical Exam:   General appearance:  No apparent distress, appears stated age and cooperative. HEENT:  Normal cephalic, atraumatic without obvious deformity. Conjunctivae/corneas clear. Neck: Supple, with full range of motion. No jugular venous distention. Trachea midline. Respiratory:  Normal respiratory effort. Clear to auscultation, bilaterally without rales/wheezes/rhonchi. Cardiovascular:  Regular rate and rhythm with normal S1/S2 without murmurs, rubs or gallops. Abdomen: Soft, non-tender, non-distended with normal bowel sounds. Musculoskeletal:   Mild weakness on the right side. Skin: Skin color, texture, turgor normal.  No rashes or lesions. Neurologic:  Neurovascularly intact without any focal sensory/motor deficits. Psychiatric:  Alert and oriented, thought content appropriate, normal insight. Capillary Refill: Brisk,< 3 seconds   Peripheral Pulses: +2 palpable, equal bilaterally. Labs:    CBC:  Lab Results   Component Value Date    WBC 4.8 07/15/2020    HGB 13.0 07/15/2020    HCT 41.5 07/15/2020    MCV 87.9 07/15/2020     07/15/2020    PROTIME 10.6 01/13/2014    INR 0.98 07/15/2020     BMP:   Lab Results   Component Value Date     07/15/2020    K 4.7 07/15/2020     07/15/2020    CO2 19 07/15/2020    PHOS 2.3 07/24/2014    BUN 29 07/15/2020    CREATININE 1.8 07/15/2020    MG 2.2 07/24/2014       Imaging  I have reviewed all imaging.       Problem List:  Patient Active Problem List   Diagnosis    Brain TIA    [facility] infarction ([facility] [address] [facility]    Hemiparesis, right ([address]    Hypertension    History of CVA (cerebrovascular accident)    Hyperhomocysteinemia ([address]    Gastroesophageal reflux disease    Myelodysplastic syndrome ([address]    H/O carotid stenosis       Assessment: Severe right carotid artery stenosis. Plan 7/15/20:  1) admission and right carotid endarterectomy today.       Electronically by Eric Dangelo MD  on 7/15/2020 at 12:56 PM

## 2020-07-15 NOTE — PROGRESS NOTES
Patient admitted to [facility] room 15 with wife at bedside. Bed in low position side rails up call light in reach. Patient denies questions at this time.

## 2020-07-16 ENCOUNTER — TELEPHONE (OUTPATIENT)
Dept: FAMILY MEDICINE CLINIC | Age: 85
End: 2020-07-16

## 2020-07-16 VITALS
SYSTOLIC BLOOD PRESSURE: 136 MMHG | OXYGEN SATURATION: 90 % | TEMPERATURE: 97.7 F | WEIGHT: 132.5 LBS | DIASTOLIC BLOOD PRESSURE: 51 MMHG | RESPIRATION RATE: 19 BRPM | HEART RATE: 59 BPM | BODY MASS INDEX: 20.08 KG/M2 | HEIGHT: 68 IN

## 2020-07-16 PROBLEM — I65.21 CAROTID STENOSIS, RIGHT: Status: ACTIVE | Noted: 2020-07-16

## 2020-07-16 PROCEDURE — 2580000003 HC RX 258: Performed by: THORACIC SURGERY (CARDIOTHORACIC VASCULAR SURGERY)

## 2020-07-16 PROCEDURE — 6370000000 HC RX 637 (ALT 250 FOR IP): Performed by: PHYSICIAN ASSISTANT

## 2020-07-16 PROCEDURE — 2580000003 HC RX 258: Performed by: PHYSICIAN ASSISTANT

## 2020-07-16 PROCEDURE — 6370000000 HC RX 637 (ALT 250 FOR IP): Performed by: ANESTHESIOLOGY

## 2020-07-16 PROCEDURE — 94640 AIRWAY INHALATION TREATMENT: CPT

## 2020-07-16 PROCEDURE — APPSS60 APP SPLIT SHARED TIME 46-60 MINUTES: Performed by: PHYSICIAN ASSISTANT

## 2020-07-16 PROCEDURE — 94761 N-INVAS EAR/PLS OXIMETRY MLT: CPT

## 2020-07-16 PROCEDURE — 2709999900 HC NON-CHARGEABLE SUPPLY

## 2020-07-16 PROCEDURE — 94760 N-INVAS EAR/PLS OXIMETRY 1: CPT

## 2020-07-16 RX ORDER — ATORVASTATIN CALCIUM 20 MG/1
20 TABLET, FILM COATED ORAL NIGHTLY
Status: DISCONTINUED | OUTPATIENT
Start: 2020-07-16 | End: 2020-07-16 | Stop reason: HOSPADM

## 2020-07-16 RX ORDER — ATORVASTATIN CALCIUM 20 MG/1
20 TABLET, FILM COATED ORAL NIGHTLY
Qty: 30 TABLET | Refills: 3 | Status: SHIPPED | OUTPATIENT
Start: 2020-07-16 | End: 2020-07-20

## 2020-07-16 RX ORDER — CLOPIDOGREL BISULFATE 75 MG/1
TABLET ORAL
Qty: 90 TABLET | Refills: 3 | Status: CANCELLED | OUTPATIENT
Start: 2020-07-16

## 2020-07-16 RX ORDER — CLOPIDOGREL BISULFATE 75 MG/1
TABLET ORAL
Qty: 90 TABLET | Refills: 3 | Status: SHIPPED | OUTPATIENT
Start: 2020-07-16 | End: 2021-03-23

## 2020-07-16 RX ADMIN — IPRATROPIUM BROMIDE AND ALBUTEROL SULFATE 1 AMPULE: .5; 3 SOLUTION RESPIRATORY (INHALATION) at 09:12

## 2020-07-16 RX ADMIN — PANTOPRAZOLE SODIUM 40 MG: 40 TABLET, DELAYED RELEASE ORAL at 12:15

## 2020-07-16 RX ADMIN — METOPROLOL TARTRATE 25 MG: 25 TABLET ORAL at 08:00

## 2020-07-16 RX ADMIN — IPRATROPIUM BROMIDE AND ALBUTEROL SULFATE 1 AMPULE: .5; 3 SOLUTION RESPIRATORY (INHALATION) at 13:31

## 2020-07-16 RX ADMIN — SODIUM CHLORIDE, PRESERVATIVE FREE 10 ML: 5 INJECTION INTRAVENOUS at 08:00

## 2020-07-16 RX ADMIN — SODIUM CHLORIDE: 9 INJECTION, SOLUTION INTRAVENOUS at 04:56

## 2020-07-16 RX ADMIN — FOLIC ACID 1 MG: 1 TABLET ORAL at 08:00

## 2020-07-16 RX ADMIN — LISINOPRIL 5 MG: 5 TABLET ORAL at 08:00

## 2020-07-16 ASSESSMENT — PAIN SCALES - GENERAL
PAINLEVEL_OUTOF10: 0
PAINLEVEL_OUTOF10: 0

## 2020-07-16 NOTE — CARE COORDINATION
07/16/20 2:31 PM    1426 Called and spoke with Remi Saint at [employer]; reported have order for home O2. Patient discharging today and requires O2 with activity so will need O2 delivered to room.

## 2020-07-16 NOTE — CARE COORDINATION
7/16/20, 7:25 AM EDT  DISCHARGE PLANNING EVALUATION:    [facility]       Admitted from: PACU 7/15/2020/ [facility] day: 1   Location: 3A-03/003-A Reason for admit: H/O carotid stenosis [Z86.79]  H/O carotid stenosis [Z86.79] Status: IP  Admit order signed?: no  - Dr Jorge Savage to follow up  PMH:  has a past medical history of Carotid stenosis, left, Cataract, Cataracts, bilateral, Esophageal abnormality, GERD (gastroesophageal reflux disease), H/O blood clots, Hearing loss, Hypertension, Macular degeneration, Pneumonia, Skin cancer, Stroke ([facility] [address] TIA (transient ischemic attack), Ulcer, Unspecified cerebral artery occlusion with cerebral infarction, Unspecified sleep apnea, and Wears dentures. Procedure: 07/16  RIGHT CAROTID ENDARTERECTOMY by Dr Jorge Savage  Pertinent abnormal Imaging:na  Medications:  Scheduled Meds:   ipratropium-albuterol  1 ampule Inhalation Q4H WA    sodium chloride flush  10 mL Intravenous 2 times per day    folic acid  1 mg Oral Daily    lisinopril  5 mg Oral Daily    metoprolol tartrate  25 mg Oral BID    pantoprazole  40 mg Oral Lunch     Continuous Infusions:   sodium chloride 100 mL/hr at 07/16/20 0456    clevidipine        Pertinent Info/Orders/Treatment Plan:  Telemetry, wound care, N/V checks, progressive ambulation, med nebs,   Diet: DIET GENERAL;   Smoking status:  reports that he quit smoking about 20 years ago. His smoking use included cigarettes. He started smoking about 69 years ago. He has a 80.00 pack-year smoking history.  He has never used smokeless tobacco.   PCP: Jane Gaeg MD  Readmission 30 days or less: no  Readmission Risk Score: 10%    Discharge Planning Evaluation  Current Residence:  Private Residence  Living Arrangements:  Spouse/Significant Other   Support Systems:  Spouse/Significant Other  Current Services PTA:     Potential Assistance Needed:  N/A  Potential Assistance Purchasing Medications:  No  Does patient want to participate in local refill/ meds to beds program?  No  Type of Home Care Services:  None  Patient expects to be discharged to:  home  Expected Discharge date:  07/15/20  Follow Up Appointment: Best Day/ Time: Monday AM    Patient Goals/Plan/Treatment Preferences: Met with Sheryl Guardian; he resides home with his spouse, does not drive (wife drives), has PCP, is able to afford medicines, and declines HH or in-home services at discharge. Son lives close-by. 10 am- Up walking in halls; sats dropped to 88% on room air. Sitting in chair. Yaneth RN will notify this CM if pt requires home O2. If needed patient agrees to SR-HME. Transportation/Food Security/Housekeeping Addressed:  No issues identified.  Evaluation: no  7/16/20, 8:48 AM EDT    Patient goals/plan/ treatment preferences discussed by  and . Patient goals/plan/ treatment preferences reviewed with patient/ family. Patient/ family verbalize understanding of discharge plan and are in agreement with goal/plan/treatment preferences. Understanding was demonstrated using the teach back method. AVS provided by RN at time of discharge, which includes all necessary medical information pertaining to the patients current course of illness, treatment, post-discharge goals of care, and treatment preferences.

## 2020-07-16 NOTE — DISCHARGE SUMMARY
CT/CV Surgery Discharge Summary     Pt Name: Juan Carlos Avendano  MRN: [id number]  YOB: 1935  Primary Care Physician: Debora Armstrong MD    Admit date:  7/15/2020  5:32 AM      Discharge date:  07/16/20    Disposition: Home    Admitting Diagnosis: Severe right carotid artery stenosis, status post left carotid endarterectomy in 2014. Discharge Diagnosis: Severe right carotid artery stenosis, status post left carotid endarterectomy in 2014. Condition: Stable    Problem List:   Patient Active Problem List   Diagnosis Code    Brain TIA G45.9    Cerebral infarction ([facility] [address] I63.9    Hemiparesis, right ([facility] [address] G81.91    Hypertension I10    History of CVA (cerebrovascular accident) Z80.78    Hyperhomocysteinemia ([facility] [address] E72.11    Gastroesophageal reflux disease K21.9    Myelodysplastic syndrome ([facility] [address] D46.9    H/O carotid stenosis Z86.79    Carotid stenosis, right I65.21         Procedures:     RIGHT CAROTID ENDARTERECTOMY, transverse skin incision, longitudinal arteriotomy, repaired with a bovine pericardial patch. No internal shunt was used. Reason for Admission:    Severe right carotid artery stenosis    Hospital Course: Following an uncomplicated right carotid endarterectomy, the patient had an unremarkable and progressive convalescence without adverse events. At time of discharge, ALL was alert, tolerating a regular diet, having bowel movements, ambulating on his own accord and had adequate analgesia on oral pain medications, and had no signs of any complications. Discharge Vitals:  height is 5' 8\" (1.727 m) and weight is 132 lb 7.9 oz (60.1 kg). His oral temperature is 97.7 °F (36.5 °C). His blood pressure is 127/51 (abnormal) and his pulse is 54. His respiration is 22 and oxygen saturation is 94%.      DISCHARGE INSTRUCTIONS:  Discharge Medications:         Medication List      CHANGE how you take these medications    metoprolol tartrate 25 MG tablet  Commonly known as: LOPRESSOR  Take 1 tablet by mouth 2 times daily  What changed:  additional instructions     pantoprazole 40 MG tablet  Commonly known as:  PROTONIX  Take 1 tablet by mouth daily  What changed:  when to take this        CONTINUE taking these medications    clopidogrel 75 MG tablet  Commonly known as:  Plavix  Take 1 po qd     folic acid 1 MG tablet  Commonly known as:  FOLVITE  Take 1 tablet by mouth daily     lisinopril 5 MG tablet  Commonly known as:  PRINIVIL;ZESTRIL  Take 1 tablet by mouth daily           Where to Get Your Medications      These medications were sent to [facility] [id number] [address][id number] - [place], [place] - . [address] - P [phone] - F [phone]  . [address], [facility] [address]    Phone:  [phone]   · clopidogrel 75 MG tablet       Diet: AHA diet as tolerated  Activity: As instructed on discharge, no lifting more than 10 lbs for one month, no driving while on narcotics. Aerobic activity (walking, climbing stairs, etc.) is encouraged. Wound Care: Clean wounds as instructed on discharge    Discharge Medications for PCI/MI (performed or attempted):    ASA:                            Yes                      Statin:                          Yes  ACE/ARB:                   NO      P2Y12 Inhibitor:          YES      Beta Blocker:               Yes        Cardiac Rehab:            Yes  Dietary Consult:           Yes           Follow-up:    1   Follow up with Aspen Wei MD 2-3 weeks  2. Follow up with Marisa Cobb PA-C  in 3-4 weeks. 3.  Restart Plavix 7/17/2020  4. The pt was agreeable to discharge and future plan of care. All questions were thoroughly answered.        Londell Factor   Electronincally signed 7/16/2020 at 2:54 PM    CC: Aspen Wei MD

## 2020-07-16 NOTE — PROGRESS NOTES
0915: PCP did not have any appointments within the 7-10 day window following discharge. Office staff states they will call the patient to get them in within that time frame. No appointments available until Aug 10. Constance Ortega with case management notified. 1555: AVS gone over with patient and wife. All medication changes and due times gone over, and all appointments gone over. No questions asked. DME went over instructions for home O2. Oxygen tank taken by wife to car. Patient discharged with belongings.

## 2020-07-16 NOTE — PROGRESS NOTES
Lisy Segovia is a 82years old male who underwent right carotid endarterectomy yesterday. He is awake and alert, reports no complaint. He tolerated a regular diet. He denied any swallowing difficulty, or hoarseness. Physical examination shows tongue is in midline, the right carotid neck incision is healing well without hematoma or signs of infection. Heart sounds irregular, no murmurs. Lung sounds clear to auscultation. Extremities no edema. Carlos-Fan drainage is minimal.  The drain was removed. Assessment: Doing well, status post a right carotid endarterectomy. Plan: Discharge home today.

## 2020-07-16 NOTE — PROGRESS NOTES
Patient was evaluated today for the diagnosis of post-op pulmonary insufficiency s/p CEA. I entered a DME order for home oxygen because the diagnosis and testing requires the patient to have supplemental oxygen. Condition will improve or be benefited by oxygen use. The patient is  able to perform good mobility in a home setting and therefore does require the use of a portable oxygen system. The need for this equipment was discussed with the patient and he understands and is in agreement.

## 2020-07-16 NOTE — PROGRESS NOTES
A home oxygen evaluation has been completed. [x]Patient is an inpatient. It is expected that the patient will be discharged within the next 48 hours. Qualified provider to write order for home prescription if patient qualifies. Social service/care managers will arrange for home oxygen. If patient is active, arrange for Home Medical supplier to assess for Oxygen Conserving Device per pulse oximetry. []Patient is an outpatient. Results will be faxed to the ordering provider. Qualified provider to write order for home prescription if patient qualifies and arranges for home oxygen. Patient was placed on room air for 30 minutes. SpO2 was 90-92 % on room air at rest. Patients SpO2 was 89% or above and did not qualify for home oxygen. Patient was walked for 6 minutes. SpO2 was 87 % during walking. Patients SpO2 was below 89% and qualified for home oxygen. Oxygen was applied at 3 lpm via nasal cannula to maintain a SpO2 between 90-92% while walking. Actual SpO2 was 92 %. Note: For any SpO2 at 76% see policy and procedure for possible qualifications.

## 2020-07-17 ENCOUNTER — TELEPHONE (OUTPATIENT)
Dept: FAMILY MEDICINE CLINIC | Age: 85
End: 2020-07-17

## 2020-07-17 LAB
MRSA SCREEN: NORMAL
URINE CULTURE, ROUTINE: NORMAL

## 2020-07-17 NOTE — TELEPHONE ENCOUNTER
Danyell 45 Transitions Initial Follow Up Call    Call within 2 business days of discharge: Yes     Patient: Carolyne Mcnamara Patient : 1935 MRN: [id number]    [unfilled]    RARS: Readmission Risk Score: 10       Spoke with: patient    Discharge department/facility:  [facility]    Non-face-to-face services provided:  Scheduled appointment with PCP-7--    Follow Up  Future Appointments   Date Time Provider Lizette Hampton   2020  3:00 PM Bao Marley MD [address]   2020  2:00 PM Harini Song MD SRPX CT/CV [facility] [place] [facility]   2020 12:15 PM [address], MD [address] [facility] [place] ([address] [place] [address])

## 2020-07-19 NOTE — PROGRESS NOTES
[facility]  Me[facility] [place]field  Dept: [phone]  Dept Fax: [phone]      POST-DISCHARGE TRANSITIONAL CARE MANAGEMENT SERVICES     Casandra Mora [facility]   YOB: 1935    No Known Allergies  Outpatient Medications Marked as Taking for the 7/20/20 encounter (Office Visit) with Eduardo Menchaca MD   Medication Sig Dispense Refill    atorvastatin (LIPITOR) 20 MG tablet Take 1 tablet by mouth nightly 90 tablet 3    clopidogrel (PLAVIX) 75 MG tablet Take 1 po qd 90 tablet 3    folic acid (FOLVITE) 1 MG tablet Take 1 tablet by mouth daily 90 tablet 3    lisinopril (PRINIVIL;ZESTRIL) 5 MG tablet Take 1 tablet by mouth daily 90 tablet 3    metoprolol tartrate (LOPRESSOR) 25 MG tablet Take 1 tablet by mouth 2 times daily (Patient taking differently: Take 25 mg by mouth 2 times daily Takes at lunch and dinner) 180 tablet 3       Patient was admitted to [facility]  Admission date: 7/15/2020  Discharge date: 7/16/2020  Admitting Problem: Severe right carotid artery stenosis, status post left carotid endarterectomy in 2014. Discharge Diagnosis: Severe right carotid artery stenosis, status post left carotid endarterectomy in 2014. Procedures:     RIGHT CAROTID ENDARTERECTOMY, transverse skin incision, longitudinal arteriotomy, repaired with a bovine pericardial patch.  No internal shunt was used. Inpatient course: Discharge summary reviewed. Hospital Course: Following an uncomplicated right carotid endarterectomy, the patient had an unremarkable and progressive convalescence without adverse events.     At time of discharge, Saloni was alert, tolerating a regular diet, having bowel movements, ambulating on his own accord and had adequate analgesia on oral pain medications, and had no signs of any complications. Follow-up:    1   Follow up with Eduardo Menchaca MD 2-3 weeks  2. Follow up with Anshul Schaeffer PA-C  in 3-4 weeks. 3.  Restart Plavix 7/17/2020  4.   The pt was agreeable to discharge and future plan of care. All questions were thoroughly answered. Current Outpatient Medications on File Prior to Visit   Medication Sig Dispense Refill    clopidogrel (PLAVIX) 75 MG tablet Take 1 po qd 90 tablet 3    folic acid (FOLVITE) 1 MG tablet Take 1 tablet by mouth daily 90 tablet 3    lisinopril (PRINIVIL;ZESTRIL) 5 MG tablet Take 1 tablet by mouth daily 90 tablet 3    metoprolol tartrate (LOPRESSOR) 25 MG tablet Take 1 tablet by mouth 2 times daily (Patient taking differently: Take 25 mg by mouth 2 times daily Takes at lunch and dinner) 180 tablet 3    pantoprazole (PROTONIX) 40 MG tablet Take 1 tablet by mouth daily (Patient not taking: Reported on 7/20/2020) 90 tablet 3     No current facility-administered medications on file prior to visit. Current status: \"Terrific\". Pt doing well and without any complaints. Wife states pt is not too compliant with exertional oxygen and incentive spirometer. Review of Systems   Constitutional: Negative for chills, diaphoresis, fatigue, fever and unexpected weight change. Eyes: Negative for visual disturbance. Respiratory: Negative for chest tightness and shortness of breath. Cardiovascular: Negative for chest pain, palpitations and leg swelling. Gastrointestinal: Negative for abdominal pain, anal bleeding, blood in stool, constipation, diarrhea, nausea and vomiting. Genitourinary: Negative for dysuria and hematuria. Musculoskeletal: Negative for neck pain. Neurological: Negative for dizziness, light-headedness and headaches. PHYSICAL EXAM         Vitals:    07/20/20 1457   BP: (!) 110/52   Pulse: 64   Resp: 12   Temp: 98.1 °F (36.7 °C)   TempSrc: Temporal   Weight: 135 lb 9.6 oz (61.5 kg)     Body mass index is 20.62 kg/m².    Wt Readings from Last 3 Encounters:   07/20/20 135 lb 9.6 oz (61.5 kg)   07/16/20 132 lb 7.9 oz (60.1 kg)   05/21/20 140 lb (63.5 kg)     BP Readings from Last 3 Encounters: 07/20/20 (!) 110/52   07/16/20 (!) 136/51   07/15/20 (!) 121/57      Physical Exam  Vitals signs and nursing note reviewed. Exam conducted with a chaperone present. Constitutional:       General: He is not in acute distress. Appearance: Normal appearance. He is well-developed. He is not ill-appearing, toxic-appearing or diaphoretic. HENT:      Head: Normocephalic and atraumatic. Right Ear: External ear normal.      Left Ear: External ear normal.      Nose: Nose normal. No rhinorrhea. Mouth/Throat:      Mouth: Mucous membranes are moist.      Pharynx: Oropharynx is clear. Eyes:      General: No scleral icterus. Right eye: No discharge. Left eye: No discharge. Extraocular Movements: Extraocular movements intact. Conjunctiva/sclera: Conjunctivae normal.      Pupils: Pupils are equal, round, and reactive to light. Neck:      Musculoskeletal: Normal range of motion and neck supple. Cardiovascular:      Rate and Rhythm: Normal rate and regular rhythm. Heart sounds: Normal heart sounds. No murmur. No friction rub. No gallop. Pulmonary:      Effort: Pulmonary effort is normal. No respiratory distress. Breath sounds: Normal breath sounds. No stridor. No wheezing, rhonchi or rales. Chest:      Chest wall: No tenderness. Abdominal:      General: Abdomen is flat. Bowel sounds are normal. There is no distension. Palpations: Abdomen is soft. There is no mass. Tenderness: There is no abdominal tenderness. There is no guarding or rebound. Hernia: No hernia is present. Musculoskeletal: Normal range of motion. General: No swelling, deformity or signs of injury. Skin:     General: Skin is warm and dry. Coloration: Skin is not jaundiced or pale. Findings: No bruising, erythema, lesion or rash. Comments: Incision warm/ dry/ intact. Healing well at this time. Neurological:      General: No focal deficit present.       Mental 3-4 months for re-evaluation. Initial post-discharge communication occurred between medical assistant and patient on 7/17/2020- see documentation in chart: telephone encounter.     Date of Visit:7/20/2020  30 Day Post-Discharge Date: 8/13/2020    Diagnostic test results reviewed: inpatient labs and pathology-Carotid Artery    Patient risk of morbidity and mortality: moderate    Medical Decision Making: moderate complexity

## 2020-07-20 ENCOUNTER — OFFICE VISIT (OUTPATIENT)
Dept: FAMILY MEDICINE CLINIC | Age: 85
End: 2020-07-20
Payer: MEDICARE

## 2020-07-20 VITALS
RESPIRATION RATE: 12 BRPM | BODY MASS INDEX: 20.62 KG/M2 | TEMPERATURE: 98.1 F | HEART RATE: 64 BPM | SYSTOLIC BLOOD PRESSURE: 110 MMHG | DIASTOLIC BLOOD PRESSURE: 52 MMHG | WEIGHT: 135.6 LBS

## 2020-07-20 PROCEDURE — 99495 TRANSJ CARE MGMT MOD F2F 14D: CPT | Performed by: FAMILY MEDICINE

## 2020-07-20 RX ORDER — ATORVASTATIN CALCIUM 20 MG/1
20 TABLET, FILM COATED ORAL NIGHTLY
Qty: 90 TABLET | Refills: 0 | Status: SHIPPED | OUTPATIENT
Start: 2020-07-20 | End: 2020-07-20 | Stop reason: SDUPTHER

## 2020-07-20 RX ORDER — ATORVASTATIN CALCIUM 20 MG/1
20 TABLET, FILM COATED ORAL NIGHTLY
Qty: 90 TABLET | Refills: 3 | Status: SHIPPED | OUTPATIENT
Start: 2020-07-20 | End: 2020-08-05 | Stop reason: DRUGHIGH

## 2020-07-20 ASSESSMENT — ENCOUNTER SYMPTOMS
SHORTNESS OF BREATH: 0
NAUSEA: 0
CHEST TIGHTNESS: 0
BLOOD IN STOOL: 0
CONSTIPATION: 0
ABDOMINAL PAIN: 0
DIARRHEA: 0
VOMITING: 0
ANAL BLEEDING: 0

## 2020-07-20 NOTE — PATIENT INSTRUCTIONS
Encouraged annual FLU VACCINE after October 1st.     Encouraged NEW SHINGLES SHOT [facility])- pt declines at this time.  (updated 7/20/2020)  COLONOSCOPY done 4/16/2014 per Dr. Danita Duque- Tubular Adenoma x 1- pt would like to hold at this time. (updated 7/20/2020)   Will hold on RAUL/ PSA at this time due to age and pt preference. Pt to continue with Dr. Saloni Kothari for Hematology at [facility]. Check FLP, CMP, and CBC in 2-3 weeks. Encouraged pt to use Incentive Spirometer and supplemental Oxygen as directed per Hospitalist.    Continue current medicines  Refills. Follow up in 3-4 months for re-evaluation.

## 2020-07-21 ENCOUNTER — TELEPHONE (OUTPATIENT)
Dept: FAMILY MEDICINE CLINIC | Age: 85
End: 2020-07-21

## 2020-07-21 NOTE — TELEPHONE ENCOUNTER
PROVIDER FEEDBACK LOOP NO SHOW     Patient:Owen Casper  : 1935  Referring Provider: Ivonne Bradshaw  Referral Type:  Eval and Treat     Procedures: AMB REFERRAL TO HEMATOLOGY ONCOLOGY [REF33]  Date Service Ordered 3/2/2020        Cuba Russell did not show for their scheduled test ordered by your office. Please call your patient to explain significance of ordered test and direct patient to call Central Scheduling to schedule test at their earliest convenience.       Please complete one of the following actions from Quick Actions buttons:     Route to Provider:  Route message to ordering provider to seek next steps in care plan.     Telephone Encounter:  Telephone encounter will open. Call patient to explain significance of ordered test and direct patient to call Central Scheduling to schedule test then Document details of call.     Open Referral:  Review referral notes or details if needed.     Close Referral:  Referral will open. Document in Notes section of referral why the referral is being closed. Examples of referral closure:  Patient had test done outside of [facility] ([facility]) (list location), Patient refuses test, Patient no longer having symptoms, Unable to reach patient.   Only close the referral if you are sure the test will not proceed.     Thank you     RUTH DOWD

## 2020-07-27 ENCOUNTER — OFFICE VISIT (OUTPATIENT)
Dept: CARDIOTHORACIC SURGERY | Age: 85
End: 2020-07-27

## 2020-07-27 VITALS
OXYGEN SATURATION: 96 % | TEMPERATURE: 97.5 F | DIASTOLIC BLOOD PRESSURE: 55 MMHG | HEART RATE: 63 BPM | WEIGHT: 134.2 LBS | SYSTOLIC BLOOD PRESSURE: 135 MMHG | BODY MASS INDEX: 20.34 KG/M2 | HEIGHT: 68 IN

## 2020-07-27 PROCEDURE — 99024 POSTOP FOLLOW-UP VISIT: CPT | Performed by: THORACIC SURGERY (CARDIOTHORACIC VASCULAR SURGERY)

## 2020-08-03 ENCOUNTER — NURSE ONLY (OUTPATIENT)
Dept: LAB | Age: 85
End: 2020-08-03

## 2020-08-03 LAB
ALBUMIN SERPL-MCNC: 4.1 G/DL (ref 3.5–5.1)
ALP BLD-CCNC: 136 U/L (ref 38–126)
ALT SERPL-CCNC: 28 U/L (ref 11–66)
ANION GAP SERPL CALCULATED.3IONS-SCNC: 10 MEQ/L (ref 8–16)
AST SERPL-CCNC: 20 U/L (ref 5–40)
BASOPHILS # BLD: 0.7 %
BASOPHILS ABSOLUTE: 0 THOU/MM3 (ref 0–0.1)
BILIRUB SERPL-MCNC: 0.4 MG/DL (ref 0.3–1.2)
BUN BLDV-MCNC: 28 MG/DL (ref 7–22)
CALCIUM SERPL-MCNC: 9 MG/DL (ref 8.5–10.5)
CHLORIDE BLD-SCNC: 109 MEQ/L (ref 98–111)
CHOLESTEROL, TOTAL: 139 MG/DL (ref 100–199)
CO2: 22 MEQ/L (ref 23–33)
CREAT SERPL-MCNC: 1.7 MG/DL (ref 0.4–1.2)
EOSINOPHIL # BLD: 2.1 %
EOSINOPHILS ABSOLUTE: 0.1 THOU/MM3 (ref 0–0.4)
ERYTHROCYTE [DISTWIDTH] IN BLOOD BY AUTOMATED COUNT: 15.7 % (ref 11.5–14.5)
ERYTHROCYTE [DISTWIDTH] IN BLOOD BY AUTOMATED COUNT: 51.6 FL (ref 35–45)
GFR SERPL CREATININE-BSD FRML MDRD: 39 ML/MIN/1.73M2
GLUCOSE BLD-MCNC: 105 MG/DL (ref 70–108)
HCT VFR BLD CALC: 40.1 % (ref 42–52)
HDLC SERPL-MCNC: 43 MG/DL
HEMOGLOBIN: 12.3 GM/DL (ref 14–18)
IMMATURE GRANS (ABS): 0.09 THOU/MM3 (ref 0–0.07)
IMMATURE GRANULOCYTES: 1.6 %
LDL CHOLESTEROL CALCULATED: 79 MG/DL
LYMPHOCYTES # BLD: 45.4 %
LYMPHOCYTES ABSOLUTE: 2.5 THOU/MM3 (ref 1–4.8)
MCH RBC QN AUTO: 27.6 PG (ref 26–33)
MCHC RBC AUTO-ENTMCNC: 30.7 GM/DL (ref 32.2–35.5)
MCV RBC AUTO: 90.1 FL (ref 80–94)
MONOCYTES # BLD: 15.2 %
MONOCYTES ABSOLUTE: 0.9 THOU/MM3 (ref 0.4–1.3)
NUCLEATED RED BLOOD CELLS: 0 /100 WBC
PLATELET # BLD: 120 THOU/MM3 (ref 130–400)
PMV BLD AUTO: 12.1 FL (ref 9.4–12.4)
POTASSIUM SERPL-SCNC: 4.8 MEQ/L (ref 3.5–5.2)
RBC # BLD: 4.45 MILL/MM3 (ref 4.7–6.1)
SEG NEUTROPHILS: 35 %
SEGMENTED NEUTROPHILS ABSOLUTE COUNT: 2 THOU/MM3 (ref 1.8–7.7)
SODIUM BLD-SCNC: 141 MEQ/L (ref 135–145)
TOTAL PROTEIN: 6.7 G/DL (ref 6.1–8)
TRIGL SERPL-MCNC: 84 MG/DL (ref 0–199)
WBC # BLD: 5.6 THOU/MM3 (ref 4.8–10.8)

## 2020-08-04 ENCOUNTER — TELEPHONE (OUTPATIENT)
Dept: FAMILY MEDICINE CLINIC | Age: 85
End: 2020-08-04

## 2020-08-04 NOTE — TELEPHONE ENCOUNTER
Attempted to call pt, no answer and VM box is full, unable to leave message  Orders pending      ----- Message from Dede Tsai MD sent at 8/3/2020  2:25 PM EDT -----  Notify pt-   Results reviewed. FLP ok, except LDL above goal of 70 (due to Carotid Stenosis)- is pt willing to increase dose of Lipitor? CMP ok, except BUN/CR elevated at 28/1.7 and ALK PHOS mildly elevated at 136- check Renal Ultrasound and refer to Dr. Mariluz Ring at this time for evaluation.    ES  CBC shows stable anemia and thrombocytopenia- will monitor in future.  ES

## 2020-08-05 RX ORDER — ATORVASTATIN CALCIUM 40 MG/1
40 TABLET, FILM COATED ORAL DAILY
Qty: 30 TABLET | Refills: 1 | Status: SHIPPED | OUTPATIENT
Start: 2020-08-05 | End: 2020-10-06

## 2020-08-05 NOTE — TELEPHONE ENCOUNTER
Spoke to the pts wife and notified her of the pts results and ES recommendations. 1) OK to increase dose of Lipitor. OK to send new Rx to [facility], will check with pharmacy. OK to mail recheck labs, no need to call pt back. 2) Agreeable to renal ultrasound. Order placed in [address] [place] [address] Rd and pts wife will call to schedule. 3) Agreeable to referral to Dr. Army Hobson. Referral placed in Epic and they will contact the pt to schedule.

## 2020-08-05 NOTE — TELEPHONE ENCOUNTER
Discussion noted. 1)  Increase Lipitor to 40 mg- 1 pill daily at this time. #30/1 refill. Check D-LDL, AST, and ALT in 6-8 weeks. Please make sure pt is using CoQ10- 200 mg- 1 pill daily also OTC to help with aches and pains that may be a result of increasing dose of Lipitor. 2 and 3)  Noted. Thanks.   ES

## 2020-08-06 RX ORDER — ATORVASTATIN CALCIUM 40 MG/1
40 TABLET, FILM COATED ORAL DAILY
Qty: 90 TABLET | OUTPATIENT
Start: 2020-08-06

## 2020-08-12 ENCOUNTER — HOSPITAL ENCOUNTER (OUTPATIENT)
Dept: ULTRASOUND IMAGING | Age: 85
Discharge: HOME | End: 2020-08-12
Payer: MEDICARE

## 2020-08-12 PROCEDURE — 76770 US EXAM ABDO BACK WALL COMP: CPT

## 2020-08-17 ENCOUNTER — TELEPHONE (OUTPATIENT)
Dept: FAMILY MEDICINE CLINIC | Age: 85
End: 2020-08-17

## 2020-08-17 NOTE — TELEPHONE ENCOUNTER
Left message to call back. ----- Message from Aspen Wei MD sent at 8/12/2020  8:56 PM EDT -----  Notify pt-   Results reviewed. IMPRESSION:  Renal cortical thinning and elevated resistive indices bilaterally. This may indicate a general medical renal disease. Results consistent with history of Hypertension. Follow up with Nephrology as planned.   ES

## 2020-08-25 NOTE — TELEPHONE ENCOUNTER
Called the pt and left a message on his voicemail notifying him of the ultrasound result and ES recommendation to f/up with nephrology. Pt did review the result in My Chart.

## 2020-09-16 ENCOUNTER — OFFICE VISIT (OUTPATIENT)
Dept: NEPHROLOGY | Age: 85
End: 2020-09-16
Payer: MEDICARE

## 2020-09-16 VITALS
TEMPERATURE: 97.2 F | SYSTOLIC BLOOD PRESSURE: 118 MMHG | BODY MASS INDEX: 19.25 KG/M2 | HEART RATE: 50 BPM | DIASTOLIC BLOOD PRESSURE: 54 MMHG | WEIGHT: 127 LBS | HEIGHT: 68 IN | OXYGEN SATURATION: 100 %

## 2020-09-16 PROCEDURE — G8420 CALC BMI NORM PARAMETERS: HCPCS | Performed by: INTERNAL MEDICINE

## 2020-09-16 PROCEDURE — 4040F PNEUMOC VAC/ADMIN/RCVD: CPT | Performed by: INTERNAL MEDICINE

## 2020-09-16 PROCEDURE — 1036F TOBACCO NON-USER: CPT | Performed by: INTERNAL MEDICINE

## 2020-09-16 PROCEDURE — 1123F ACP DISCUSS/DSCN MKR DOCD: CPT | Performed by: INTERNAL MEDICINE

## 2020-09-16 PROCEDURE — G8427 DOCREV CUR MEDS BY ELIG CLIN: HCPCS | Performed by: INTERNAL MEDICINE

## 2020-09-16 PROCEDURE — 99203 OFFICE O/P NEW LOW 30 MIN: CPT | Performed by: INTERNAL MEDICINE

## 2020-09-16 NOTE — PROGRESS NOTES
[address] [facility]  [address]  Dept: [address]: [phone]  Outpatient Consult  [phone]  9/16/2020 10:53 AM EDT        Pt Name:    Marvin Ocampo  YOB: 1935  Primary Care Physician:  Kateryna Jama MD     Chief Complaint:   Chief Complaint   Patient presents with   Clif Cruz Patient     Ref Dr. Jimbo Jones        Background Information/Interval History:   The patient is a pleasant 82y.o. year old male referred by Dr. Jimbo Jones for CKD III. Serum creatinine 1.7-1.8 mg/dL since March 2020. Last levels were from 2016. Renal US showed cortical thinning, elevated resistive indices. Patient has hx of well controlled HTN, carotid artery stenosis s/p right carotid endarterectomy in July,  left carotid endarterectomy 2014, previous CVA, MDS, hyperhomocysteinemia,hx of past EtOH use with gastric ulcer > 10 years ago, on PPI chronically. Denies heartburn symptoms. Denies hx of gross hematuria, frothy urine, kidney stone, NSAID use, herbal supplements. Former smoker. Allergies:  Patient has no known allergies. Past Medical History:  Past Medical History:   Diagnosis Date    Carotid stenosis, left     Cataract     Cataracts, bilateral     Esophageal abnormality     GERD (gastroesophageal reflux disease)     H/O blood clots     Hearing loss     Hypertension 4/13/2014    Macular degeneration     Pneumonia     Skin cancer     removal    Stroke ([facility] [address] [address]     TIA (transient ischemic attack)     Ulcer     Unspecified cerebral artery occlusion with cerebral infarction NOV. 2013    Unspecified sleep apnea     Wears dentures     FULL UPPER        Past Surgical History:  Past Surgical History:   Procedure Laterality Date    CAROTID ENDARTERECTOMY  01/13/2014    [facility]    CAROTID ENDARTERECTOMY N/A 7/15/2020    RIGHT CAROTID ENDARTERECTOMY performed by Elodia Cooney MD at [facility] TRIPLE  2013         SKIN CANCER EXCISION  2010    Basal Cell Carcinoma- Dr. Yari Paul TUNNELED CENTRAL VENOUS CATHETER W/ SUBCUTANEOUS PORT          Family History:  Family History   Problem Relation Age of Onset    No Known Problems Mother     No Known Problems Father     No Known Problems Sister     Breast Cancer Sister         Social History:  Social History     Socioeconomic History    Marital status:      Spouse name: Adrián Bobby Number of children: 1    Years of education: 15    Highest education level: Not on file   Occupational History    Not on file   Social Needs    Financial resource strain: Not on file    Food insecurity     Worry: Not on file     Inability: Not on file   [employer] needs     Medical: Not on file     Non-medical: Not on file   Tobacco Use    Smoking status: Former Smoker     Packs/day: 2.00     Years: 40.00     Pack years: 80.00     Types: Cigarettes     Start date: 1951     Last attempt to quit: 1/10/2000     Years since quittin.6    Smokeless tobacco: Never Used   Substance and Sexual Activity    Alcohol use: No     Alcohol/week: 0.0 standard drinks    Drug use: No    Sexual activity: Never     Comment: - Monogamous   Lifestyle    Physical activity     Days per week: Not on file     Minutes per session: Not on file    Stress: Not on file   Relationships    Social connections     Talks on phone: Not on file     Gets together: Not on file     Attends [employer] service: Not on file     Active member of club or organization: Not on file     Attends meetings of clubs or organizations: Not on file     Relationship status: Not on file    Intimate partner violence     Fear of current or ex partner: Not on file     Emotionally abused: Not on file     Physically abused: Not on file     Forced sexual activity: Not on file   Other Topics Concern    Not on file   Social History Narrative  Not on file        Review of Systems:  Constitutional: no fever or chills  Head: No headaches  Eyes: no blurry vision, no discharge  Ears: no ear pain or hearing changes  Nose: no runny nose or epistaxis  Respiratory: no shortness of breath or cough or sputum production  Cardiovascular: no chest pain, no edema  GI: no nausea, no vomiting, occasional diarrhea needing Immodium  : denies any hematuria, no flank pain  Skin: no rash  Musculoskeletal: no joint pain, moves all ext  Neuro: no tremor, no slurred speech  Psychiatric: stable mood, no depression or insomnia     Home Medications:  Prior to Admission medications    Medication Sig Start Date End Date Taking? Authorizing Provider   atorvastatin (LIPITOR) 40 MG tablet Take 1 tablet by mouth daily 8/5/20  Yes Fletcher Kasper MD   clopidogrel (PLAVIX) 75 MG tablet Take 1 po qd 7/16/20  Yes Laci Bhandari   folic acid (FOLVITE) 1 MG tablet Take 1 tablet by mouth daily 3/2/20  Yes Fletcher Kasper MD   lisinopril (PRINIVIL;ZESTRIL) 5 MG tablet Take 1 tablet by mouth daily 3/2/20  Yes Fletcher Kasper MD   metoprolol tartrate (LOPRESSOR) 25 MG tablet Take 1 tablet by mouth 2 times daily  Patient taking differently: Take 25 mg by mouth 2 times daily Takes at lunch and dinner 3/2/20  Yes Fletcher Kasper MD   pantoprazole (PROTONIX) 40 MG tablet Take 1 tablet by mouth daily 3/2/20  Yes Fletcher Kasper MD        Physical Examination:  VITALS:  BP (!) 118/54 (Site: Left Upper Arm, Position: Sitting, Cuff Size: Large Adult)   Pulse 50   Temp 97.2 °F (36.2 °C) (Temporal)   Ht 5' 8\" (1.727 m)   Wt 127 lb (57.6 kg)   SpO2 100%   BMI 19.31 kg/m²   Body mass index is 19.31 kg/m².   Wt Readings from Last 3 Encounters:   09/16/20 127 lb (57.6 kg)   07/27/20 134 lb 3.2 oz (60.9 kg)   07/20/20 135 lb 9.6 oz (61.5 kg)     Constitutional and General Appearance: awake, alert, hard of hearing  Eyes: no icteric sclera, no pallor conjunctiva, no discharge seen from either eye  Ears and Nose: normal external appearance of left and right ear and nose. No active drainage from nose.    Oral: moist oral mucus membranes  Neck: No jugular venous distention, appears symmetric, good ROM  Lungs: Air entry B/L, no crackles or rales, no use of accessory muscles  Heart: regular rate, S1, S2  Extremities: no LE edema, no cyanosis  GI: soft, non-tender, no guarding  Skin: no rash seen on exposed extremities  Musculo: moves all extremities  Neuro: no slurred speech, right sided weakness  Psychiatric: Awake, alert, Oriented     Laboratory & Diagnostics:  CBC:   Lab Results   Component Value Date    WBC 5.6 08/03/2020    HGB 12.3 (L) 08/03/2020    HCT 40.1 (L) 08/03/2020    MCV 90.1 08/03/2020     (L) 08/03/2020     BMP:    Lab Results   Component Value Date     08/03/2020     07/15/2020     12/07/2016    K 4.8 08/03/2020    K 4.7 07/15/2020    K 3.4 (L) 12/07/2016     08/03/2020     07/15/2020     12/07/2016    CO2 22 (L) 08/03/2020    CO2 19 (L) 07/15/2020    CO2 23 12/07/2016    BUN 28 (H) 08/03/2020    BUN 29 (H) 07/15/2020    BUN 13 12/07/2016    CREATININE 1.7 (H) 08/03/2020    CREATININE 1.8 (H) 07/15/2020    CREATININE 1.7 (H) 03/27/2020    GLUCOSE 105 08/03/2020    GLUCOSE 102 07/15/2020    GLUCOSE 160 (H) 12/07/2016      Hepatic:   Lab Results   Component Value Date    AST 20 08/03/2020    AST 23 12/05/2016    AST 26 03/03/2016    ALT 28 08/03/2020    ALT 20 12/05/2016    ALT 29 03/03/2016    BILITOT 0.4 08/03/2020    BILITOT 0.3 12/05/2016    BILITOT 0.3 03/03/2016    ALKPHOS 136 (H) 08/03/2020    ALKPHOS 81 12/05/2016    ALKPHOS 83 03/03/2016     BNP:   Lab Results   Component Value Date    .2 (H) 01/15/2014     Lipids:   Lab Results   Component Value Date    CHOL 139 08/03/2020    HDL 43 08/03/2020     INR:   Lab Results   Component Value Date    INR 0.98 07/15/2020    INR 0.92 12/05/2016    INR 0.97 03/03/2016     URINE: No results found for: Art Ro  Lab Results   Component Value Date    NITRU NEGATIVE 12/06/2016    COLORU YELLOW 12/06/2016    PHUR 5.0 12/06/2016    LABCAST NONE SEEN 07/23/2014    LABCAST NONE SEEN 07/23/2014    WBCUA 0-2 03/04/2016    RBCUA 0-2 03/04/2016    YEAST NONE SEEN 03/04/2016    BACTERIA NONE 03/04/2016    SPECGRAV 1.015 07/23/2014    LEUKOCYTESUR NEGATIVE 12/06/2016    UROBILINOGEN 0.2 12/06/2016    BILIRUBINUR NEGATIVE 12/06/2016    BLOODU NEGATIVE 12/06/2016    GLUCOSEU NEGATIVE 12/06/2016    KETUA NEGATIVE 12/06/2016      Microalbumen/Creatinine ratio:  No components found for: RUCREAT        Impression/Plan:   1. 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 Will obtain UA, urine prot/creat ratio. We discussed stopping PPI if able and taking Pepcid instead. Renal US reviewed. Goals of care include slowing the rate of progression by controlling blood pressures and avoiding nephrotoxic agents such as NSAIDs and IV contrast.   2. HTN: controlled  3. Mild metabolic acidosis; will monitor  4. ALONDRA s/p B/L endarterectomy  5. Hx CVA    Return in 4 months. Thought process was discussed with the patient.   Thank you for the referral.  Please do not hesitate to contact me if you have any questions or concerns        Lizett Thomas, DO  [facility]

## 2020-10-06 RX ORDER — ATORVASTATIN CALCIUM 40 MG/1
40 TABLET, FILM COATED ORAL DAILY
Qty: 90 TABLET | Refills: 3 | Status: SHIPPED | OUTPATIENT
Start: 2020-10-06 | End: 2021-09-13 | Stop reason: SDUPTHER

## 2020-10-06 NOTE — TELEPHONE ENCOUNTER
Last written: 8-5-20 #30/1   Last seen: 7-20-20   Next visit: 11-23-20  Last lipid 8-3-20    Order pended for #90/3

## 2020-11-11 ASSESSMENT — LIFESTYLE VARIABLES
AUDIT-C TOTAL SCORE: INCOMPLETE
HOW OFTEN DO YOU HAVE A DRINK CONTAINING ALCOHOL: NEVER
AUDIT TOTAL SCORE: INCOMPLETE
HOW OFTEN DO YOU HAVE A DRINK CONTAINING ALCOHOL: 0

## 2020-11-11 ASSESSMENT — PATIENT HEALTH QUESTIONNAIRE - PHQ9
SUM OF ALL RESPONSES TO PHQ QUESTIONS 1-9: 0
2. FEELING DOWN, DEPRESSED OR HOPELESS: 0
SUM OF ALL RESPONSES TO PHQ QUESTIONS 1-9: 0
SUM OF ALL RESPONSES TO PHQ QUESTIONS 1-9: 0
1. LITTLE INTEREST OR PLEASURE IN DOING THINGS: 0
SUM OF ALL RESPONSES TO PHQ9 QUESTIONS 1 & 2: 0

## 2020-11-17 ENCOUNTER — NURSE ONLY (OUTPATIENT)
Dept: LAB | Age: 85
End: 2020-11-17

## 2020-11-17 LAB
ANION GAP SERPL CALCULATED.3IONS-SCNC: 12 MEQ/L (ref 8–16)
BUN BLDV-MCNC: 19 MG/DL (ref 7–22)
CALCIUM SERPL-MCNC: 9.7 MG/DL (ref 8.5–10.5)
CHLORIDE BLD-SCNC: 106 MEQ/L (ref 98–111)
CO2: 23 MEQ/L (ref 23–33)
CREAT SERPL-MCNC: 1.5 MG/DL (ref 0.4–1.2)
GFR SERPL CREATININE-BSD FRML MDRD: 45 ML/MIN/1.73M2
GLUCOSE BLD-MCNC: 147 MG/DL (ref 70–108)
PHOSPHORUS: 4 MG/DL (ref 2.4–4.7)
POTASSIUM SERPL-SCNC: 4.1 MEQ/L (ref 3.5–5.2)
PTH INTACT: 48.4 PG/ML (ref 15–65)
SODIUM BLD-SCNC: 141 MEQ/L (ref 135–145)
VITAMIN D 25-HYDROXY: 19 NG/ML (ref 30–100)

## 2020-11-18 ENCOUNTER — TELEPHONE (OUTPATIENT)
Dept: NEPHROLOGY | Age: 85
End: 2020-11-18

## 2020-11-18 ENCOUNTER — OFFICE VISIT (OUTPATIENT)
Dept: CARDIOLOGY CLINIC | Age: 85
End: 2020-11-18
Payer: MEDICARE

## 2020-11-18 VITALS
SYSTOLIC BLOOD PRESSURE: 136 MMHG | HEIGHT: 68 IN | HEART RATE: 71 BPM | WEIGHT: 142 LBS | BODY MASS INDEX: 21.52 KG/M2 | DIASTOLIC BLOOD PRESSURE: 61 MMHG

## 2020-11-18 LAB
BACTERIA: NORMAL
BILIRUBIN URINE: NEGATIVE
BLOOD, URINE: NEGATIVE
CASTS: NORMAL /LPF
CASTS: NORMAL /LPF
CHARACTER, URINE: CLEAR
COLOR: YELLOW
CREATININE URINE: 87.3 MG/DL
CRYSTALS: NORMAL
EPITHELIAL CELLS, UA: NORMAL /HPF
GLUCOSE, URINE: NEGATIVE MG/DL
KETONES, URINE: NEGATIVE
LEUKOCYTE ESTERASE, URINE: NEGATIVE
MISCELLANEOUS LAB TEST RESULT: NORMAL
NITRITE, URINE: NEGATIVE
PH UA: 5 (ref 5–9)
PROT/CREAT RATIO, UR: 0.12
PROTEIN UA: NEGATIVE MG/DL
PROTEIN, URINE: 10.2 MG/DL
RBC URINE: NORMAL /HPF
RENAL EPITHELIAL, UA: NORMAL
SPECIFIC GRAVITY UA: 1.01 (ref 1–1.03)
UROBILINOGEN, URINE: 0.2 EU/DL (ref 0–1)
WBC UA: NORMAL /HPF
YEAST: NORMAL

## 2020-11-18 PROCEDURE — 1123F ACP DISCUSS/DSCN MKR DOCD: CPT | Performed by: NUCLEAR MEDICINE

## 2020-11-18 PROCEDURE — 4040F PNEUMOC VAC/ADMIN/RCVD: CPT | Performed by: NUCLEAR MEDICINE

## 2020-11-18 PROCEDURE — G8420 CALC BMI NORM PARAMETERS: HCPCS | Performed by: NUCLEAR MEDICINE

## 2020-11-18 PROCEDURE — G8427 DOCREV CUR MEDS BY ELIG CLIN: HCPCS | Performed by: NUCLEAR MEDICINE

## 2020-11-18 PROCEDURE — 99213 OFFICE O/P EST LOW 20 MIN: CPT | Performed by: NUCLEAR MEDICINE

## 2020-11-18 PROCEDURE — G8484 FLU IMMUNIZE NO ADMIN: HCPCS | Performed by: NUCLEAR MEDICINE

## 2020-11-18 PROCEDURE — 1036F TOBACCO NON-USER: CPT | Performed by: NUCLEAR MEDICINE

## 2020-11-18 NOTE — PROGRESS NOTES
[facility],[address]  [address] [place] [id number]  Dept: [phone]  Dept Fax: [phone]  Loc: [phone]    Visit Date: 2020    Christine Burgess is a 82 y.o. male who presents todayfor:  Chief Complaint   Patient presents with    6 Month Follow-Up    Coronary Artery Disease    Hypertension   had another CEA lately   Did well  No known CAD  No chest pain   No changes in breathing  BP is stable   No new symptoms  BP is stable   Previous stroke       HPI:  HPI  Past Medical History:   Diagnosis Date    Carotid stenosis, left     Cataract     Cataracts, bilateral     Esophageal abnormality     GERD (gastroesophageal reflux disease)     H/O blood clots     Hearing loss     Hypertension 2014    Macular degeneration     Pneumonia     Skin cancer     removal    Stroke ([address]     TIA (transient ischemic attack)     Ulcer     Unspecified cerebral artery occlusion with cerebral infarction 2013    Unspecified sleep apnea     Wears dentures     FULL UPPER      Past Surgical History:   Procedure Laterality Date    [facility]    CAROTID ENDARTERECTOMY N/A 7/15/2020    RIGHT CAROTID ENDARTERECTOMY performed by Ana Barney MD at [address] [place] [address] CATH POWER PICC TRIPLE  2013         SKIN CANCER EXCISION  2010    Basal Cell Carcinoma- Dr. Benita Watts TUNNELED CENTRAL VENOUS CATHETER W/ SUBCUTANEOUS PORT       Family History   Problem Relation Age of Onset    No Known Problems Mother     No Known Problems Father     No Known Problems Sister     Breast Cancer Sister      Social History     Tobacco Use    Smoking status: Former Smoker     Packs/day: 2.00     Years: 40.00     Pack years: 80.00     Types: Cigarettes     Start date: 1949     Last attempt to quit: 1/10/2000     Years since quittin.8    Smokeless tobacco: Never Used   Substance Use Topics  Alcohol use: No     Alcohol/week: 0.0 standard drinks      Current Outpatient Medications   Medication Sig Dispense Refill    atorvastatin (LIPITOR) 40 MG tablet TAKE 1 TABLET BY MOUTH DAILY 90 tablet 3    clopidogrel (PLAVIX) 75 MG tablet Take 1 po qd 90 tablet 3    folic acid (FOLVITE) 1 MG tablet Take 1 tablet by mouth daily 90 tablet 3    lisinopril (PRINIVIL;ZESTRIL) 5 MG tablet Take 1 tablet by mouth daily 90 tablet 3    metoprolol tartrate (LOPRESSOR) 25 MG tablet Take 1 tablet by mouth 2 times daily (Patient taking differently: Take 25 mg by mouth 2 times daily Takes at lunch and dinner) 180 tablet 3    pantoprazole (PROTONIX) 40 MG tablet Take 1 tablet by mouth daily 90 tablet 3     No current facility-administered medications for this visit. No Known Allergies  Health Maintenance   Topic Date Due    Annual Wellness Visit (AWV)  06/23/2019    Flu vaccine (1) 09/01/2020    Shingles Vaccine (1 of 2) 03/02/2021 (Originally 3/25/1987)    Lipid screen  08/03/2021    Potassium monitoring  11/17/2021    Creatinine monitoring  11/17/2021    DTaP/Tdap/Td vaccine (2 - Td) 10/18/2028    Pneumococcal 65+ years Vaccine  Completed    Hepatitis A vaccine  Aged Out    Hepatitis B vaccine  Aged Out    Hib vaccine  Aged Out    Meningococcal (ACWY) vaccine  Aged Out       Subjective:  Review of Systems  General:   No fever, no chills, No fatigue or weight loss  Pulmonary:    No dyspnea, no wheezing  Cardiac:    Denies recent chest pain,   GI:     No nausea or vomiting, no abdominal pain  Neuro:    No dizziness or light headedness,   Musculoskeletal:  No recent active issues  Extremities:   No edema, no obvious claudication       Objective:  Physical Exam  /61   Pulse 71   Ht 5' 8\" (1.727 m)   Wt 142 lb (64.4 kg)   BMI 21.59 kg/m²   General:   Well developed, well nourished  Lungs:   Clear to auscultation  Heart:    Normal S1 S2, Slight murmur.  no rubs, no gallops  Abdomen:   Soft, non

## 2020-11-22 NOTE — PROGRESS NOTES
[facility]  [facility]  Dept: [phone]  Dept Fax: [phone]    SUBJECTIVE     June Fung is a 82 y.o.male    Pt presents for follow up of HTN, Hyperlipidemia, CVA, GERD, Carotid Stenosis (s/p Staged Bilateral Endarterectomies- 2014 (left), 7/15/2020 (right)), Hyperhomocysteinemia, Myelodysplastic Syndrome. Pt feeling ok since last visit- interval history and any new issues noted below:     Pt and wife deny any new complaints at this time- doing well overall. Glucometer readings at home are not needed. The home BP readings have not been checked regularly. Wt Readings from Last 3 Encounters:   11/23/20 141 lb 12.8 oz (64.3 kg)   11/18/20 142 lb (64.4 kg)   09/16/20 127 lb (57.6 kg)   Weight increased 6# since last visit 4 months ago. Patient Active Problem List   Diagnosis    Brain TIA    [facility]bral infarction ([facility]    Hemiparesis, right ([address]    Hypertension    History of CVA (cerebrovascular accident)    Hyperhomocysteinemia ([facility]    Gastroesophageal reflux disease    Myelodysplastic syndrome ([facility]    H/O carotid stenosis    Carotid stenosis, right     Current Outpatient Medications   Medication Sig Dispense Refill    atorvastatin (LIPITOR) 40 MG tablet TAKE 1 TABLET BY MOUTH DAILY 90 tablet 3    clopidogrel (PLAVIX) 75 MG tablet Take 1 po qd 90 tablet 3    folic acid (FOLVITE) 1 MG tablet Take 1 tablet by mouth daily 90 tablet 3    lisinopril (PRINIVIL;ZESTRIL) 5 MG tablet Take 1 tablet by mouth daily 90 tablet 3    metoprolol tartrate (LOPRESSOR) 25 MG tablet Take 1 tablet by mouth 2 times daily (Patient taking differently: Take 25 mg by mouth 2 times daily Takes at lunch and dinner) 180 tablet 3     No current facility-administered medications for this visit. Review of Systems   Constitutional: Negative for chills, diaphoresis, fatigue, fever and unexpected weight change. Eyes: Negative for visual disturbance.    Respiratory: Negative for chest tightness and shortness of breath. Cardiovascular: Negative for chest pain, palpitations and leg swelling. Gastrointestinal: Negative for abdominal pain, anal bleeding, blood in stool, constipation, diarrhea, nausea and vomiting. Genitourinary: Negative for dysuria and hematuria. Musculoskeletal: Negative for neck pain. Neurological: Negative for dizziness, light-headedness and headaches. OBJECTIVE     BP (!) 136/58   Pulse 68   Temp 96.6 °F (35.9 °C)   Resp 16   Wt 141 lb 12.8 oz (64.3 kg)   BMI 21.56 kg/m²   Body mass index is 21.56 kg/m². BP Readings from Last 3 Encounters:   11/23/20 (!) 136/58   11/18/20 136/61   09/16/20 (!) 118/54       Physical Exam  Vitals signs and nursing note reviewed. Exam conducted with a chaperone present. Constitutional:       General: He is not in acute distress. Appearance: Normal appearance. He is not ill-appearing, toxic-appearing or diaphoretic. HENT:      Head: Normocephalic and atraumatic. Right Ear: External ear normal.      Left Ear: External ear normal.      Nose: Nose normal. No rhinorrhea. Mouth/Throat:      Mouth: Mucous membranes are moist.      Pharynx: Oropharynx is clear. Eyes:      General: No scleral icterus. Right eye: No discharge. Left eye: No discharge. Extraocular Movements: Extraocular movements intact. Conjunctiva/sclera: Conjunctivae normal.      Pupils: Pupils are equal, round, and reactive to light. Neck:      Musculoskeletal: Normal range of motion. Cardiovascular:      Rate and Rhythm: Normal rate and regular rhythm. Heart sounds: Normal heart sounds. No murmur. No friction rub. No gallop. Pulmonary:      Effort: Pulmonary effort is normal. No respiratory distress. Breath sounds: Normal breath sounds. No stridor. No wheezing, rhonchi or rales. Chest:      Chest wall: No tenderness. Abdominal:      General: Abdomen is flat.  Bowel sounds are normal. There is no distension. Palpations: Abdomen is soft. There is no mass. Tenderness: There is no abdominal tenderness. There is no guarding or rebound. Hernia: No hernia is present. Musculoskeletal: Normal range of motion. General: No swelling, deformity or signs of injury. Skin:     General: Skin is warm and dry. Coloration: Skin is not jaundiced or pale. Findings: No bruising, erythema, lesion or rash. Neurological:      General: No focal deficit present. Mental Status: He is alert and oriented to person, place, and time. Mental status is at baseline. Motor: No weakness. Coordination: Coordination normal.      Gait: Gait normal.   Psychiatric:         Mood and Affect: Mood normal.         Behavior: Behavior normal.         Thought Content:  Thought content normal.         Judgment: Judgment normal.       Component      Latest Ref Rng & Units 11/17/2020 11/17/2020 11/17/2020           1:21 PM  1:21 PM  1:21 PM   Glucose, Urine      NEGATIVE mg/dl  NEGATIVE    Bilirubin, Urine      NEGATIVE  NEGATIVE    Ketones, Urine      NEGATIVE  NEGATIVE    Specific Gravity, UA      1.002 - 1.030  1.010    Blood, Urine      NEGATIVE  NEGATIVE    pH, UA      5.0 - 9.0  5.0    Protein, UA      NEGATIVE mg/dl  NEGATIVE    Urobilinogen, Urine      0.0 - 1.0 eu/dl  0.2    Nitrite, Urine      NEGATIVE  NEGATIVE    Leukocyte Esterase, Urine      NEGATIVE  NEGATIVE    Color, UA      YELLOW-STRAW  YELLOW    Character, Urine      CLR-SL.CLOUD  CLEAR    RBC, UA      0-2/hpf /hpf  0-2    WBC, UA      0-4/hpf /hpf  NONE SEEN    Epithelial Cells, UA      3-5/hpf /hpf  NONE SEEN    Bacteria, UA      FEW/NONE SEEN  NONE SEEN    Casts      NONE SEEN /lpf NONE SEEN NONE SEEN    Crystals      NONE SEEN  NONE SEEN    Renal Epithelial, UA      NONE SEEN  NONE SEEN    Yeast, Urine      NONE SEEN  NONE SEEN    Miscellaneous Lab Test Result        NONE SEEN    Sodium      135 - 145 meq/L   141 Potassium      3.5 - 5.2 meq/L   4.1   Chloride      98 - 111 meq/L   106   CO2      23 - 33 meq/L   23   Glucose      70 - 108 mg/dL   147 (H)   BUN      7 - 22 mg/dL   19   Creatinine      0.4 - 1.2 mg/dL   1.5 (H)   Calcium      8.5 - 10.5 mg/dL   9.7   Protein, Urine      mg/dl   10.2   Creatinine, Urine      mg/dl   87.3   Prot/Creat Ratio, Ur         0.12   Pth Intact      15.0 - 65.0 pg/mL   48.4   Vit D, 25-Hydroxy      30 - 100 ng/ml   19 (L)   Phosphorus      2.4 - 4.7 mg/dL   4.0   Anion Gap      8.0 - 16.0 meq/L   12.0   Est, Glom Filt Rate      ml/min/1.73m2   45 (A)         Lab Results   Component Value Date    LABA1C 5.5 11/01/2013       Lab Results   Component Value Date    CHOL 139 08/03/2020    TRIG 84 08/03/2020    HDL 43 08/03/2020    LDLCALC 79 08/03/2020     The ASCVD Risk score (Edward Arriaza, et al., 2013) failed to calculate for the following reasons:     The 2013 ASCVD risk score is only valid for ages 36 to 78    Lab Results   Component Value Date     11/17/2020    K 4.1 11/17/2020     11/17/2020    CO2 23 11/17/2020    BUN 19 11/17/2020    CREATININE 1.5 (H) 11/17/2020    GLUCOSE 147 (H) 11/17/2020    CALCIUM 9.7 11/17/2020    PROT 6.7 08/03/2020    LABALBU 4.1 08/03/2020    BILITOT 0.4 08/03/2020    ALKPHOS 136 (H) 08/03/2020    AST 20 08/03/2020    ALT 28 08/03/2020    LABGLOM 45 (A) 11/17/2020    GFRAA >60 01/14/2014    GLOB NOT REPORTED 11/01/2013     No results found for: Dora Rios     No results found for: TSH, G6AWLEK, A9BNKHB, THYROIDAB, FT3, T4FREE    Lab Results   Component Value Date    WBC 5.6 08/03/2020    HGB 12.3 (L) 08/03/2020    HCT 40.1 (L) 08/03/2020    MCV 90.1 08/03/2020     (L) 08/03/2020     No results found for: PSA, PSADIA    Immunization History   Administered Date(s) Administered    Pneumococcal Polysaccharide ([id number]) 07/24/2014    Tdap (Boostrix, Adacel) 10/18/2018       Health Maintenance   Topic Date Due    Annual Wellness Visit (AWV)  2019    Flu vaccine (1) 2020    Shingles Vaccine (1 of 2) 2021 (Originally 3/25/1987)    Lipid screen  2021    Potassium monitoring  2021    Creatinine monitoring  2021    DTaP/Tdap/Td vaccine (2 - Td) 10/18/2028    Pneumococcal 65+ years Vaccine  Completed    Hepatitis A vaccine  Aged Out    Hepatitis B vaccine  Aged Out    Hib vaccine  Aged Out    Meningococcal (ACWY) vaccine  Aged Out       AAA ultrasound (Male, 65-75, smoked ever)  ordered/ not indicated at this time? Done and negative per wife < 12 months ago. CT Lung Screen (55-80, 30 pk-yrs, smoking or quit <15 years) ordered/ not indicated at this time? NO, age 82 and quit smoking > 20 years ago. Future Appointments   Date Time Provider Lizette Hampton   2021 10:00 AM DO KHADAR Mclaughlin [facility]   2021  1:00 PM Darylene Falls, MD SRPX CT/CV [address]   2021  1:30 PM Chanel Asencio MD [facility] - [address] [place] [address]       ASSESSMENT       Diagnosis Orders   1. IFG (impaired fasting glucose)  Hemoglobin A1C    Hemoglobin A1C   2. Essential hypertension  T4, Free    TSH without Reflex   3. Hyperlipidemia, unspecified hyperlipidemia type  LDL Cholesterol, Direct   4. Carotid stenosis, right     5. Hyperhomocysteinemia (HCC)     6. Stage 3 chronic kidney disease, unspecified whether stage 3a or 3b CKD     7. Anemia, unspecified type     8. Myelodysplastic syndrome ([address]     9. Gastroesophageal reflux disease, unspecified whether esophagitis present     10. Elevated alkaline phosphatase level  Hepatic Function Panel   11. Cerebral infarction, unspecified mechanism ([address]         PLAN      Pt to continue with Dr. Marisa Mcduffie for Hematology at [facility]. Follow up with Dr. Ada Yuen for CKD management- next appt 2021. Start Vitamin D3- 5,000 units daily as directed per Dr. Ada Yuen. Check HGA1C, D-LDL, LFT's, TSH/ FREE T4 at this time. Check HGA1C in 6 months.    Continue current medicines  No refills needed at this time   Follow up in 6 months for re-evaluation.         Preventive Health Topics:  Encouraged annual FLU VACCINE- pt declines (updated 11/23/2020)  Encouraged NEW SHINGLES SHOT (SHINGRIX)- pt declines at this time.  (updated 11/23/2020)  COLONOSCOPY done 4/16/2014 per Dr. Margi Osborne Adenoma x 1- pt would like to hold at this time. (updated 11/23/2020)   Pt declines SFOBT/ FIT at this time (updated 11/23/2020)  Will hold on RAUL/ PSA at this time due to age and pt preference. (updated 11/23/2020)              Electronically signed by Re Samaniego MD on 11/23/2020 at 2:43 PM

## 2020-11-23 ENCOUNTER — OFFICE VISIT (OUTPATIENT)
Dept: FAMILY MEDICINE CLINIC | Age: 85
End: 2020-11-23
Payer: MEDICARE

## 2020-11-23 VITALS
SYSTOLIC BLOOD PRESSURE: 136 MMHG | DIASTOLIC BLOOD PRESSURE: 58 MMHG | WEIGHT: 141.8 LBS | TEMPERATURE: 96.6 F | BODY MASS INDEX: 21.56 KG/M2 | RESPIRATION RATE: 16 BRPM | HEART RATE: 68 BPM

## 2020-11-23 PROCEDURE — 1123F ACP DISCUSS/DSCN MKR DOCD: CPT | Performed by: FAMILY MEDICINE

## 2020-11-23 PROCEDURE — G8484 FLU IMMUNIZE NO ADMIN: HCPCS | Performed by: FAMILY MEDICINE

## 2020-11-23 PROCEDURE — G0438 PPPS, INITIAL VISIT: HCPCS | Performed by: FAMILY MEDICINE

## 2020-11-23 PROCEDURE — 4040F PNEUMOC VAC/ADMIN/RCVD: CPT | Performed by: FAMILY MEDICINE

## 2020-11-23 RX ORDER — CLOPIDOGREL BISULFATE 75 MG/1
TABLET ORAL
Qty: 90 TABLET | Refills: 3 | Status: CANCELLED | OUTPATIENT
Start: 2020-11-23

## 2020-11-23 RX ORDER — ATORVASTATIN CALCIUM 40 MG/1
40 TABLET, FILM COATED ORAL DAILY
Qty: 90 TABLET | Refills: 3 | Status: CANCELLED | OUTPATIENT
Start: 2020-11-23

## 2020-11-23 RX ORDER — LISINOPRIL 5 MG/1
5 TABLET ORAL DAILY
Qty: 90 TABLET | Refills: 3 | Status: CANCELLED | OUTPATIENT
Start: 2020-11-23

## 2020-11-23 RX ORDER — FOLIC ACID 1 MG/1
1 TABLET ORAL DAILY
Qty: 90 TABLET | Refills: 3 | Status: CANCELLED | OUTPATIENT
Start: 2020-11-23

## 2020-11-23 ASSESSMENT — ENCOUNTER SYMPTOMS
ANAL BLEEDING: 0
BLOOD IN STOOL: 0
CHEST TIGHTNESS: 0
DIARRHEA: 0
ABDOMINAL PAIN: 0
SHORTNESS OF BREATH: 0
VOMITING: 0
NAUSEA: 0
CONSTIPATION: 0

## 2020-11-23 NOTE — PROGRESS NOTES
Medicare Annual Wellness Visit  Name: Evens Henderson Date: 2020   MRN: [id number] Sex: Male   Age: 82 y.o. Ethnicity: Non-/Non    : 1935 Race: Roger Croft is here for Medicare AWV; Check-Up (4 month checkup. labs done 20. has been to dr Marisel Garcia hematology and had b12 injections. has been to dr Latesha Cespedes. Sola Money declines flu shots. ); and Medication Refill (order pending to EP to Rafa Wills)    Screenings for behavioral, psychosocial and functional/safety risks, and cognitive dysfunction are all negative except as indicated below. These results, as well as other patient data from the [address] form, are documented in Flowsheets linked to this Encounter. No Known Allergies    Prior to Visit Medications    Medication Sig Taking? Authorizing Provider   atorvastatin (LIPITOR) 40 MG tablet TAKE 1 TABLET BY MOUTH DAILY Yes Yaw Varghese MD   clopidogrel (PLAVIX) 75 MG tablet Take 1 po qd Yes LUANN Mendez   folic acid (FOLVITE) 1 MG tablet Take 1 tablet by mouth daily Yes Yaw Varghese MD   lisinopril (PRINIVIL;ZESTRIL) 5 MG tablet Take 1 tablet by mouth daily Yes Yaw Varghese MD   metoprolol tartrate (LOPRESSOR) 25 MG tablet Take 1 tablet by mouth 2 times daily  Patient taking differently: Take 25 mg by mouth 2 times daily Takes at lunch and dinner Yes Yaw Varghese MD       Past Medical History:   Diagnosis Date    Carotid stenosis, left     Cataract     Cataracts, bilateral     Esophageal abnormality     GERD (gastroesophageal reflux disease)     H/O blood clots     Hearing loss     Hypertension 2014    Macular degeneration     Pneumonia     Skin cancer     removal    [facility]     TIA (transient ischemic attack)     Ulcer     Unspecified cerebral artery occlusion with cerebral infarction 2013    Unspecified sleep apnea     Wears dentures     FULL UPPER       Past Surgical History:   Procedure Laterality Date    [facility]    CAROTID ENDARTERECTOMY N/A 7/15/2020    RIGHT CAROTID ENDARTERECTOMY performed by Thompson Wilks MD at [facility] TRIPLE  11/1/2013         SKIN CANCER EXCISION  2010    Basal Cell Carcinoma- Dr. Oh Cuff TUNNELED CENTRAL VENOUS CATHETER W/ SUBCUTANEOUS PORT         Family History   Problem Relation Age of Onset    No Known Problems Mother     No Known Problems Father     No Known Problems Sister     Breast Cancer Sister        CareTeam (Including outside providers/suppliers regularly involved in providing care):   Patient Care Team:  Uri Whaley MD as PCP - General (Family Medicine)  Uri Whaley MD as PCP - [facility]    Wt Readings from Last 3 Encounters:   11/23/20 141 lb 12.8 oz (64.3 kg)   11/18/20 142 lb (64.4 kg)   09/16/20 127 lb (57.6 kg)     Vitals:    11/23/20 1359   BP: (!) 136/58   Pulse: 68   Resp: 16   Temp: 96.6 °F (35.9 °C)   Weight: 141 lb 12.8 oz (64.3 kg)     Body mass index is 21.56 kg/m². Based upon direct observation of the patient, evaluation of cognition reveals recent and remote memory intact.     General Appearance: alert and oriented to person, place and time, well developed and well- nourished, in no acute distress  Skin: warm and dry, no rash or erythema  Head: normocephalic and atraumatic  Eyes: pupils equal, round, and reactive to light, extraocular eye movements intact, conjunctivae normal  ENT: tympanic membrane, external ear and ear canal normal bilaterally, nose without deformity, nasal mucosa and turbinates normal without polyps  Neck: supple and non-tender without mass, no thyromegaly or thyroid nodules, no cervical lymphadenopathy  Pulmonary/Chest: clear to auscultation bilaterally- no wheezes, rales or rhonchi, normal air movement, no respiratory distress  Cardiovascular: normal rate, regular rhythm, normal S1 and S2, no murmurs, rubs, clicks, or gallops, distal pulses intact, no carotid bruits  Abdomen: soft, non-tender, non-distended, normal bowel sounds, no masses or organomegaly  Extremities: no cyanosis, clubbing or edema  Musculoskeletal: normal range of motion, no joint swelling, deformity or tenderness  Neurologic: reflexes normal and symmetric, no cranial nerve deficit, gait, coordination and speech normal    Patient's complete Health Risk Assessment and screening values have been reviewed and are found in Flowsheets. The following problems were reviewed today and where indicated follow up appointments were made and/or referrals ordered. Positive Risk Factor Screenings with Interventions:     Cognitive: Words recalled: 1 Word Recalled  Clock Drawing Test (CDT) Score: (!) Abnormal  Total Score Interpretation: Positive Mini-Cog  Cognitive Impairment Interventions:  · Patient declines any further evaluation/treatment for cognitive impairment  · pt and wife deny any current significant problems- would like to hold on any work up at this time. General Health and ACP:  General  In general, how would you say your health is?: Fair  In the past 7 days, have you experienced any of the following? New or Increased Pain, New or Increased Fatigue, Loneliness, Social Isolation, Stress or Anger?: (!) New or Increased Fatigue, None of These  Do you get the social and emotional support that you need?: Yes  Do you have a Living Will?: Yes  Advance Directives     Power of  Living Will ACP-Advance Directive ACP-Power of     Not on File Coral rogelio on 08/28/17 Filed [address] Risk Interventions:  · Fatigue: patient declines any further evaluation/treatment for this issue   · Pt follows with Dr. Sabrina Bradford for Anemia.      Health Habits/Nutrition:  Health Habits/Nutrition  Do you exercise for at least 20 minutes 2-3 times per week?: (!) No  Have you lost any weight without trying in the past 3 months?: (!) Yes  Do you eat fewer than 2 meals per day?: No  Have you seen a dentist within the past year?: (!) No     Health Habits/Nutrition Interventions:  · Inadequate physical activity:  patient is not ready to increase his/her physical activity level at this time  · Dental exam overdue:  patient declines dental evaluation, pt edentulous   · Pt has actually gained weight since last visit 3-4 months ago. Hearing/Vision:  No exam data present  Hearing/Vision  Do you or your family notice any trouble with your hearing?: (!) Yes  Do you have difficulty driving, watching TV, or doing any of your daily activities because of your eyesight?: No  Have you had an eye exam within the past year?: (!) No  Hearing/Vision Interventions:  · Hearing concerns:  patient declines any further evaluation/treatment for hearing issues, pt uses wireless earphones for the TV. · Vision concerns:  patient encouraged to make appointment with his/her eye specialist, patient declines any further evaluation/treatment for this issue    Personalized Preventive Plan   Current Health Maintenance Status  Immunization History   Administered Date(s) Administered    Pneumococcal Polysaccharide ([id number]) 07/24/2014    Tdap (Boostrix, Adacel) 10/18/2018        Health Maintenance   Topic Date Due    Annual Wellness Visit (AWV)  06/23/2019    Flu vaccine (1) 09/01/2020    Shingles Vaccine (1 of 2) 03/02/2021 (Originally 3/25/1987)    Lipid screen  08/03/2021    Potassium monitoring  11/17/2021    Creatinine monitoring  11/17/2021    DTaP/Tdap/Td vaccine (2 - Td) 10/18/2028    Pneumococcal 65+ years Vaccine  Completed    Hepatitis A vaccine  Aged Out    Hepatitis B vaccine  Aged Out    Hib vaccine  Aged Out    Meningococcal (ACWY) vaccine  Aged Out     Recommendations for [employer] Due: see orders and patient instructions/AVS.  .   Recommended screening schedule for the next 5-10 years is provided to the patient in written form: see Patient Instructions/AVS.    Gabinohali Crouch was seen today for medicare awv, check-up and medication refill. Diagnoses and all orders for this visit:    IFG (impaired fasting glucose)  -     Hemoglobin A1C; Future  -     Hemoglobin A1C; Future    Essential hypertension  -     T4, Free; Future  -     TSH without Reflex; Future    Hyperlipidemia, unspecified hyperlipidemia type  -     LDL Cholesterol, Direct; Future    Carotid stenosis, right    Hyperhomocysteinemia (HCC)    Stage 3 chronic kidney disease, unspecified whether stage 3a or 3b CKD    Anemia, unspecified type    Myelodysplastic syndrome (HCC)    Gastroesophageal reflux disease, unspecified whether esophagitis present    Elevated alkaline phosphatase level  -     Hepatic Function Panel;  Future    Cerebral infarction, unspecified mechanism ([facility]

## 2020-11-23 NOTE — PATIENT INSTRUCTIONS
steps per day on a pedometer . · Order or download the FREE \"Exercise & Physical Activity: Your Everyday Guide\" from [employer]. Call 6-220.702.4229 or search [employer] online. · You need 1256-8748 mg of calcium and 2487-0258 IU of vitamin D per day. It is possible to meet your calcium requirement with diet alone, but a vitamin D supplement is usually necessary to meet this goal.  · When exposed to the sun, use a sunscreen that protects against both UVA and UVB radiation with an SPF of 30 or greater. Reapply every 2 to 3 hours or after sweating, drying off with a towel, or swimming. · Always wear a seat belt when traveling in a car. Always wear a helmet when riding a bicycle or motorcycle.

## 2020-12-02 ENCOUNTER — NURSE ONLY (OUTPATIENT)
Dept: LAB | Age: 85
End: 2020-12-02

## 2020-12-02 LAB
ALBUMIN SERPL-MCNC: 4.2 G/DL (ref 3.5–5.1)
ALP BLD-CCNC: 108 U/L (ref 38–126)
ALT SERPL-CCNC: 21 U/L (ref 11–66)
AST SERPL-CCNC: 15 U/L (ref 5–40)
AVERAGE GLUCOSE: 108 MG/DL (ref 70–126)
BILIRUB SERPL-MCNC: 0.4 MG/DL (ref 0.3–1.2)
BILIRUBIN DIRECT: < 0.2 MG/DL (ref 0–0.3)
HBA1C MFR BLD: 5.6 % (ref 4.4–6.4)
LDL CHOLESTEROL DIRECT: 73.13 MG/DL
T4 FREE: 1.08 NG/DL (ref 0.93–1.76)
TOTAL PROTEIN: 6.8 G/DL (ref 6.1–8)
TSH SERPL DL<=0.05 MIU/L-ACNC: 1.59 UIU/ML (ref 0.4–4.2)

## 2020-12-03 ENCOUNTER — TELEPHONE (OUTPATIENT)
Dept: FAMILY MEDICINE CLINIC | Age: 85
End: 2020-12-03

## 2020-12-03 NOTE — LETTER
[address] [address]  Phone: [phone]  Fax: [phone]    Cat Murphy MD        December 9, 2020    [facility]       Dear Charlotte Brink:    Dr Daniela Tao reviewed your recent blood work. Here is his response: \"Results reviewed. HGA1C (average sugar) ok at 5.6   FREE T4 and TSH (thyroid) ok  LDL (bad cholesterol) slightly above goal at 73.13- continue Lipitor 40 mg daily at this time. LFT's (liver function test) ok   Follow up as scheduled. ES\"    If you have any questions or concerns, please don't hesitate to call.     Sincerely,      The Nursing Staff for  Cat Murhpy MD

## 2020-12-03 NOTE — TELEPHONE ENCOUNTER
----- Message from Re Samaniego MD sent at 12/2/2020  4:57 PM EST -----  Notify pt-   Results reviewed. HGA1C ok at 5.6   FREE T4 and TSH ok  LDL slightly above goal at 73.13- continue Lipitor 40 mg daily at this time. LFT's ok   Follow up as scheduled.   ES

## 2021-01-08 DIAGNOSIS — E55.9 VITAMIN D DEFICIENCY: ICD-10-CM

## 2021-01-08 DIAGNOSIS — N18.30 STAGE 3 CHRONIC KIDNEY DISEASE, UNSPECIFIED WHETHER STAGE 3A OR 3B CKD (HCC): Primary | ICD-10-CM

## 2021-01-11 ENCOUNTER — NURSE ONLY (OUTPATIENT)
Dept: LAB | Age: 86
End: 2021-01-11

## 2021-01-11 ENCOUNTER — TELEPHONE (OUTPATIENT)
Dept: NEPHROLOGY | Age: 86
End: 2021-01-11

## 2021-01-11 DIAGNOSIS — E55.9 VITAMIN D DEFICIENCY: ICD-10-CM

## 2021-01-11 DIAGNOSIS — N18.30 STAGE 3 CHRONIC KIDNEY DISEASE, UNSPECIFIED WHETHER STAGE 3A OR 3B CKD (HCC): ICD-10-CM

## 2021-01-11 LAB
ANION GAP SERPL CALCULATED.3IONS-SCNC: 13 MEQ/L (ref 8–16)
BUN BLDV-MCNC: 43 MG/DL (ref 7–22)
CALCIUM SERPL-MCNC: 9.4 MG/DL (ref 8.5–10.5)
CHLORIDE BLD-SCNC: 111 MEQ/L (ref 98–111)
CO2: 18 MEQ/L (ref 23–33)
CREAT SERPL-MCNC: 2.1 MG/DL (ref 0.4–1.2)
GFR SERPL CREATININE-BSD FRML MDRD: 30 ML/MIN/1.73M2
GLUCOSE BLD-MCNC: 97 MG/DL (ref 70–108)
PHOSPHORUS: 4 MG/DL (ref 2.4–4.7)
POTASSIUM SERPL-SCNC: 4.8 MEQ/L (ref 3.5–5.2)
PTH INTACT: 32.1 PG/ML (ref 15–65)
SODIUM BLD-SCNC: 142 MEQ/L (ref 135–145)
VITAMIN D 25-HYDROXY: 34 NG/ML (ref 30–100)

## 2021-01-12 LAB
BILIRUBIN URINE: NEGATIVE
BLOOD, URINE: NEGATIVE
CHARACTER, URINE: CLEAR
COLOR: YELLOW
GLUCOSE, URINE: NEGATIVE MG/DL
KETONES, URINE: NEGATIVE
LEUKOCYTE EST, POC: NEGATIVE
NITRITE, URINE: NEGATIVE
PH UA: 5 (ref 5–9)
PROTEIN UA: NEGATIVE MG/DL
SPECIFIC GRAVITY UA: 1.01 (ref 1–1.03)
UROBILINOGEN, URINE: 0.2 EU/DL (ref 0–1)

## 2021-01-13 ENCOUNTER — OFFICE VISIT (OUTPATIENT)
Dept: NEPHROLOGY | Age: 86
End: 2021-01-13
Payer: MEDICARE

## 2021-01-13 VITALS
HEART RATE: 52 BPM | BODY MASS INDEX: 22.2 KG/M2 | DIASTOLIC BLOOD PRESSURE: 52 MMHG | TEMPERATURE: 97.2 F | OXYGEN SATURATION: 98 % | SYSTOLIC BLOOD PRESSURE: 123 MMHG | WEIGHT: 146 LBS

## 2021-01-13 DIAGNOSIS — N18.30 STAGE 3 CHRONIC KIDNEY DISEASE, UNSPECIFIED WHETHER STAGE 3A OR 3B CKD (HCC): Primary | ICD-10-CM

## 2021-01-13 PROCEDURE — 1123F ACP DISCUSS/DSCN MKR DOCD: CPT | Performed by: INTERNAL MEDICINE

## 2021-01-13 PROCEDURE — 99213 OFFICE O/P EST LOW 20 MIN: CPT | Performed by: INTERNAL MEDICINE

## 2021-01-13 PROCEDURE — 1036F TOBACCO NON-USER: CPT | Performed by: INTERNAL MEDICINE

## 2021-01-13 PROCEDURE — 4040F PNEUMOC VAC/ADMIN/RCVD: CPT | Performed by: INTERNAL MEDICINE

## 2021-01-13 PROCEDURE — G8484 FLU IMMUNIZE NO ADMIN: HCPCS | Performed by: INTERNAL MEDICINE

## 2021-01-13 PROCEDURE — G8420 CALC BMI NORM PARAMETERS: HCPCS | Performed by: INTERNAL MEDICINE

## 2021-01-13 PROCEDURE — G8427 DOCREV CUR MEDS BY ELIG CLIN: HCPCS | Performed by: INTERNAL MEDICINE

## 2021-01-13 NOTE — PROGRESS NOTES
[address] [facility]  [address]  [facility] [address]  Dept: [phone]  Loc: [phone]  Progress Note  1/13/2021 10:13 AM      Pt Name:    Ritesh Roth  YOB: 1935  Primary Care Physician:  Margie Pearl MD     Chief Complaint:   Chief Complaint   Patient presents with    Follow-up     CKD III        History of Present Illness: This is a follow-up visit for CKD III. Baseline creatinine  1.7-1.8 mg/dL. Hx of HTN, ALONDRA s/p B/L endarterectomy, prior CVA, MDS, hyperhomocysteinemia, hx gastric ulcer on PPI. Creat up to 2.1 mg/dL. We stopped PPI last visit. Doing ok off of it, no heartburn symptoms. Drinks about 2-3 glasses of water a day. No edema. BP controlled. No nsaids. No difficulty urinating. Accompanied by his wife. Pertinent items are noted in HPI. Past History:  Past Medical History:   Diagnosis Date    Carotid stenosis, left     Cataract     Cataracts, bilateral     Esophageal abnormality     GERD (gastroesophageal reflux disease)     H/O blood clots     Hearing loss     Hypertension 4/13/2014    Macular degeneration     Pneumonia     Skin cancer     removal    Stroke ([facility] [address]     TIA (transient ischemic attack)     Ulcer     Unspecified cerebral artery occlusion with cerebral infarction NOV. 2013    Unspecified sleep apnea     Wears dentures     FULL UPPER     Past Surgical History:   Procedure Laterality Date    CAROTID ENDARTERECTOMY  01/13/2014    [facility]    CAROTID ENDARTERECTOMY N/A 7/15/2020    RIGHT CAROTID ENDARTERECTOMY performed by Blake Pinon MD at [facility] POWER PICC TRIPLE  11/1/2013         SKIN CANCER EXCISION  2010    Basal Cell Carcinoma- Dr. Kady Wei TUNNELED CENTRAL VENOUS CATHETER W/ SUBCUTANEOUS PORT          VITALS:  BP (!) 123/52 (Site: Right Upper Arm, Position: Sitting, Cuff Size: Large 01/11/2021    BUN 19 11/17/2020    BUN 28 (H) 08/03/2020    CREATININE 2.1 (H) 01/11/2021    CREATININE 1.5 (H) 11/17/2020    CREATININE 1.7 (H) 08/03/2020    GLUCOSE 97 01/11/2021    GLUCOSE 147 (H) 11/17/2020    GLUCOSE 105 08/03/2020      Hepatic:   Lab Results   Component Value Date    AST 15 12/02/2020    AST 20 08/03/2020    AST 23 12/05/2016    ALT 21 12/02/2020    ALT 28 08/03/2020    ALT 20 12/05/2016    BILITOT 0.4 12/02/2020    BILITOT 0.4 08/03/2020    BILITOT 0.3 12/05/2016    ALKPHOS 108 12/02/2020    ALKPHOS 136 (H) 08/03/2020    ALKPHOS 81 12/05/2016     BNP:   Lab Results   Component Value Date    .2 (H) 01/15/2014     Lipids:   Lab Results   Component Value Date    CHOL 139 08/03/2020    HDL 43 08/03/2020     INR:   Lab Results   Component Value Date    INR 0.98 07/15/2020    INR 0.92 12/05/2016    INR 0.97 03/03/2016     URINE:   Lab Results   Component Value Date    PROTUR 10.2 11/17/2020     Lab Results   Component Value Date    NITRU NEGATIVE 01/12/2021    COLORU YELLOW 01/12/2021    PHUR 5.0 01/12/2021    LABCAST NONE SEEN 11/17/2020    LABCAST NONE SEEN 11/17/2020    WBCUA NONE SEEN 11/17/2020    RBCUA 0-2 11/17/2020    YEAST NONE SEEN 11/17/2020    BACTERIA NONE SEEN 11/17/2020    SPECGRAV 1.015 01/12/2021    LEUKOCYTESUR NEGATIVE 01/12/2021    LEUKOCYTESUR NEGATIVE 11/17/2020    UROBILINOGEN 0.2 01/12/2021    BILIRUBINUR NEGATIVE 01/12/2021    BLOODU NEGATIVE 01/12/2021    GLUCOSEU NEGATIVE 12/06/2016    KETUA NEGATIVE 01/12/2021      Microalbumen/Creatinine ratio:  No components found for: RUCREAT        Impression/Plan:   1. 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 No significant proteinuria on UA. Renal fxn a little worse. Advised to increase water intake and will repeat a bmp in 2 weeks. If worse we may need to stop Lisinopril.     Goals of care include slowing rate of progression by controlling blood pressure and by avoiding nephrotoxins such as NSAIDs and IV contrast.    2. Metabolic acidosis: due to worsening renal fxn. Repeating in 2 weeks and if persists will need to add sodium bicarbonate  3. Vit D deficiency: improved  4. HTN; stable  5. Hx carotid artery stenosis s/p CEA B/L  6. Hx CVA    Bloodwork and medications were reviewed and plan of care discussed with the patient. Return to clinic in 3 months  or sooner if the need arises.       Carlos Baer DO  [facility]

## 2021-01-20 ENCOUNTER — IMMUNIZATION (OUTPATIENT)
Dept: PRIMARY CARE CLINIC | Age: 86
End: 2021-01-20
Payer: MEDICARE

## 2021-01-20 PROCEDURE — 91300 COVID-19, PFIZER VACCINE 30MCG/0.3ML DOSE: CPT | Performed by: FAMILY MEDICINE

## 2021-01-20 PROCEDURE — 0001A COVID-19, PFIZER VACCINE 30MCG/0.3ML DOSE: CPT | Performed by: FAMILY MEDICINE

## 2021-01-25 ENCOUNTER — OFFICE VISIT (OUTPATIENT)
Dept: CARDIOTHORACIC SURGERY | Age: 86
End: 2021-01-25
Payer: MEDICARE

## 2021-01-25 VITALS
HEART RATE: 53 BPM | BODY MASS INDEX: 21.22 KG/M2 | DIASTOLIC BLOOD PRESSURE: 55 MMHG | SYSTOLIC BLOOD PRESSURE: 101 MMHG | WEIGHT: 140 LBS | HEIGHT: 68 IN

## 2021-01-25 DIAGNOSIS — I65.23 BILATERAL CAROTID ARTERY STENOSIS: Primary | ICD-10-CM

## 2021-01-25 PROCEDURE — G8420 CALC BMI NORM PARAMETERS: HCPCS | Performed by: THORACIC SURGERY (CARDIOTHORACIC VASCULAR SURGERY)

## 2021-01-25 PROCEDURE — 99213 OFFICE O/P EST LOW 20 MIN: CPT | Performed by: THORACIC SURGERY (CARDIOTHORACIC VASCULAR SURGERY)

## 2021-01-25 PROCEDURE — 1123F ACP DISCUSS/DSCN MKR DOCD: CPT | Performed by: THORACIC SURGERY (CARDIOTHORACIC VASCULAR SURGERY)

## 2021-01-25 PROCEDURE — 4040F PNEUMOC VAC/ADMIN/RCVD: CPT | Performed by: THORACIC SURGERY (CARDIOTHORACIC VASCULAR SURGERY)

## 2021-01-25 PROCEDURE — 1036F TOBACCO NON-USER: CPT | Performed by: THORACIC SURGERY (CARDIOTHORACIC VASCULAR SURGERY)

## 2021-01-25 PROCEDURE — G8428 CUR MEDS NOT DOCUMENT: HCPCS | Performed by: THORACIC SURGERY (CARDIOTHORACIC VASCULAR SURGERY)

## 2021-01-25 PROCEDURE — G8484 FLU IMMUNIZE NO ADMIN: HCPCS | Performed by: THORACIC SURGERY (CARDIOTHORACIC VASCULAR SURGERY)

## 2021-01-25 NOTE — PROGRESS NOTES
90 tablet, Rfl: 3    metoprolol tartrate (LOPRESSOR) 25 MG tablet, Take 1 tablet by mouth 2 times daily (Patient taking differently: Take 25 mg by mouth 2 times daily Takes at lunch and dinner), Disp: 180 tablet, Rfl: 3    Family History: This patient's family history includes Breast Cancer in his sister; No Known Problems in his father, mother, and sister. Social History:  Maria Eugenia Hutchinson  reports that he quit smoking about 21 years ago. His smoking use included cigarettes. He started smoking about 69 years ago. He has a 80.00 pack-year smoking history. He has never used smokeless tobacco. He reports that he does not drink alcohol or use drugs. Vital Signs:   BP (!) 101/55 (Site: Left Upper Arm, Position: Sitting, Cuff Size: Medium Adult)   Pulse 53   Ht 5' 8\" (1.727 m)   Wt 140 lb (63.5 kg)   BMI 21.29 kg/m²     ROS:   Constitutional: Negative for activity change, chills, fatigue, fever and unexpected weight change. Respiratory: Negative for apnea, shortness of breath, wheezing and stridor. Cardiovascular: Negative for chest pain, palpitations and leg swelling. Gastrointestinal: Negative for hematochezia, melana, constipation, and N/V/D. Musculoskeletal: Negative for myalgias  Skin: Negative for color change, rash and wound. Neurological: Negative for dizziness or syncope. Physical Exam:  General appearance:  No acute distress, appears stated age and cooperative. Neck: No jugular venous distention. Trachea midline. No carotid bruits. Transfers scar in the right neck healed well. No bruit over carotid artery. Respiratory:  Normal respiratory effort. Clear to auscultation, bilaterally without Rales/Wheezes/Rhonch. Cardiovascular:  Regular rate and rhythm with normal S1/S2 without murmurs, rubs or gallops. Abdomen: Soft, non-tender, non-distended with normal bowel sounds. Ext: No clubbing, cyanosis or edema bilaterally. Full range of motion without deformity.    Skin: Skin color, texture, turgor normal.  No rashes or lesions. Neurologic:  Neurovascularly intact without any focal sensory/motor deficits. Psychiatric:  Alert and oriented, thought content appropriate, normal insight. Capillary Refill: Brisk,< 3 seconds   Peripheral Pulses: +2 palpable, equal bilaterally     Labs:    CBC:  Lab Results   Component Value Date    WBC 5.6 08/03/2020    HGB 12.3 08/03/2020    HCT 40.1 08/03/2020    MCV 90.1 08/03/2020     08/03/2020    PROTIME 10.6 01/13/2014    INR 0.98 07/15/2020     BMP:   Lab Results   Component Value Date     01/11/2021    K 4.8 01/11/2021    K 4.7 07/15/2020     01/11/2021    CO2 18 01/11/2021    PHOS 4.0 01/11/2021    BUN 43 01/11/2021    CREATININE 2.1 01/11/2021    MG 2.2 07/24/2014         Problem List:  Patient Active Problem List   Diagnosis    Brain TIA    Cerebral infarction ([address]    Hemiparesis, right ([address]    Hypertension    History of CVA (cerebrovascular accident)    Hyperhomocysteinemia ([address]    Gastroesophageal reflux disease    Myelodysplastic syndrome ([address]    H/O carotid stenosis    Carotid stenosis, right       Assessment: Status post right carotid endarterectomy 07/15/2021. Plan 1/25/21:  1) follow-up bilateral carotid duplex study in 1 to 2 weeks. 2) follow-up with yearly bilateral carotid duplex study after that. Thank you for allowing us to be involved in the patient's care.     Electronically by Stephanie Nguyễn MD  on 1/25/2021 at 1:14 PM

## 2021-02-04 ENCOUNTER — HOSPITAL ENCOUNTER (OUTPATIENT)
Dept: INTERVENTIONAL RADIOLOGY/VASCULAR | Age: 86
Discharge: HOME | End: 2021-02-04
Payer: MEDICARE

## 2021-02-04 DIAGNOSIS — I65.23 BILATERAL CAROTID ARTERY STENOSIS: ICD-10-CM

## 2021-02-04 PROCEDURE — 93880 EXTRACRANIAL BILAT STUDY: CPT

## 2021-02-10 ENCOUNTER — IMMUNIZATION (OUTPATIENT)
Dept: PRIMARY CARE CLINIC | Age: 86
End: 2021-02-10
Payer: MEDICARE

## 2021-02-10 PROCEDURE — 91300 COVID-19, PFIZER VACCINE 30MCG/0.3ML DOSE: CPT | Performed by: FAMILY MEDICINE

## 2021-02-10 PROCEDURE — 0002A COVID-19, PFIZER VACCINE 30MCG/0.3ML DOSE: CPT | Performed by: FAMILY MEDICINE

## 2021-03-22 DIAGNOSIS — I63.9 CEREBRAL INFARCTION, UNSPECIFIED MECHANISM (HCC): ICD-10-CM

## 2021-03-22 DIAGNOSIS — I10 ESSENTIAL HYPERTENSION: ICD-10-CM

## 2021-03-23 RX ORDER — CLOPIDOGREL BISULFATE 75 MG/1
TABLET ORAL
Qty: 90 TABLET | Refills: 3 | Status: SHIPPED | OUTPATIENT
Start: 2021-03-23 | End: 2022-03-29 | Stop reason: SDUPTHER

## 2021-03-23 RX ORDER — LISINOPRIL 5 MG/1
TABLET ORAL
Qty: 90 TABLET | Refills: 3 | Status: SHIPPED | OUTPATIENT
Start: 2021-03-23 | End: 2021-07-07 | Stop reason: ALTCHOICE

## 2021-03-23 NOTE — TELEPHONE ENCOUNTER
Edmond Ferrer needs refill of   Requested Prescriptions     Pending Prescriptions Disp Refills    lisinopril (PRINIVIL;ZESTRIL) 5 MG tablet [Pharmacy Med Name: LISINOPRIL 5 MG Tablet] 90 tablet 3     Sig: TAKE 1 TABLET EVERY DAY    clopidogrel (PLAVIX) 75 MG tablet [Pharmacy Med Name: CLOPIDOGREL 75 MG Tablet] 90 tablet 3     Sig: TAKE 1 TABLET EVERY DAY       Last Filled on:  3/2/2020 90*3    Last Visit Date:  11/23/2020-HEMANT    Next Visit Date:    5/24/2021

## 2021-04-16 ENCOUNTER — OFFICE VISIT (OUTPATIENT)
Dept: FAMILY MEDICINE CLINIC | Age: 86
End: 2021-04-16
Payer: MEDICARE

## 2021-04-16 VITALS
SYSTOLIC BLOOD PRESSURE: 110 MMHG | HEART RATE: 80 BPM | BODY MASS INDEX: 21.29 KG/M2 | WEIGHT: 140 LBS | DIASTOLIC BLOOD PRESSURE: 58 MMHG

## 2021-04-16 DIAGNOSIS — H61.23 BILATERAL IMPACTED CERUMEN: Primary | ICD-10-CM

## 2021-04-16 DIAGNOSIS — E72.11 HYPERHOMOCYSTEINEMIA (HCC): ICD-10-CM

## 2021-04-16 PROCEDURE — 4040F PNEUMOC VAC/ADMIN/RCVD: CPT | Performed by: NURSE PRACTITIONER

## 2021-04-16 PROCEDURE — 69209 REMOVE IMPACTED EAR WAX UNI: CPT | Performed by: NURSE PRACTITIONER

## 2021-04-16 PROCEDURE — 1036F TOBACCO NON-USER: CPT | Performed by: NURSE PRACTITIONER

## 2021-04-16 PROCEDURE — 1123F ACP DISCUSS/DSCN MKR DOCD: CPT | Performed by: NURSE PRACTITIONER

## 2021-04-16 PROCEDURE — G8420 CALC BMI NORM PARAMETERS: HCPCS | Performed by: NURSE PRACTITIONER

## 2021-04-16 PROCEDURE — 99213 OFFICE O/P EST LOW 20 MIN: CPT | Performed by: NURSE PRACTITIONER

## 2021-04-16 PROCEDURE — G8427 DOCREV CUR MEDS BY ELIG CLIN: HCPCS | Performed by: NURSE PRACTITIONER

## 2021-04-16 RX ORDER — FOLIC ACID 1 MG/1
1 TABLET ORAL DAILY
Qty: 90 TABLET | Refills: 3 | Status: SHIPPED | OUTPATIENT
Start: 2021-04-16 | End: 2022-03-30 | Stop reason: SDUPTHER

## 2021-04-16 ASSESSMENT — ENCOUNTER SYMPTOMS
ABDOMINAL PAIN: 0
DIARRHEA: 0
COUGH: 0
RHINORRHEA: 0
SORE THROAT: 0
VOMITING: 0

## 2021-04-16 NOTE — PROGRESS NOTES
[address] [facility] [address]  Dept: [phone]  Dept Fax: [phone]: [phone]     Visit Date:  4/16/2021      Patient:  Daniel Morrell  YOB: 1935    HPI:     Chief Complaint   Patient presents with    Ear Fullness       Pt has a HX of having wax build up and is already hard of hearing, wife noticed over the past week he is having more trouble hearing. No pain, fever or drainage. Pt also needs a refill of folic acid before next appt. AWV with ES in May. Ear Fullness   There is pain in the left ear. This is a new problem. The current episode started in the past 7 days. The problem has been gradually worsening. There has been no fever. The pain is moderate. Pertinent negatives include no abdominal pain, coughing, diarrhea, headaches, hearing loss, neck pain, rhinorrhea, sore throat or vomiting. He has tried nothing for the symptoms. His past medical history is significant for hearing loss. There is no history of a chronic ear infection or a tympanostomy tube. Medications    Current Outpatient Medications:     folic acid (FOLVITE) 1 MG tablet, Take 1 tablet by mouth daily, Disp: 90 tablet, Rfl: 3    lisinopril (PRINIVIL;ZESTRIL) 5 MG tablet, TAKE 1 TABLET EVERY DAY, Disp: 90 tablet, Rfl: 3    clopidogrel (PLAVIX) 75 MG tablet, TAKE 1 TABLET EVERY DAY, Disp: 90 tablet, Rfl: 3    Cholecalciferol (VITAMIN D3) 125 MCG (5000 UT) TABS, Take by mouth, Disp: , Rfl:     atorvastatin (LIPITOR) 40 MG tablet, TAKE 1 TABLET BY MOUTH DAILY, Disp: 90 tablet, Rfl: 3    metoprolol tartrate (LOPRESSOR) 25 MG tablet, Take 1 tablet by mouth 2 times daily (Patient taking differently: Take 25 mg by mouth 2 times daily Takes at lunch and dinner), Disp: 180 tablet, Rfl: 3    The patient has No Known Allergies.     Past Medical History  Mahendra Turner  has a past medical history of Carotid stenosis, left, Cataract, Cataracts, bilateral, Esophageal abnormality, GERD (gastroesophageal reflux disease), H/O blood clots, Hearing loss, Hypertension, Macular degeneration, Pneumonia, Skin cancer, Stroke ([address] TIA (transient ischemic attack), Ulcer, Unspecified cerebral artery occlusion with cerebral infarction, Unspecified sleep apnea, and Wears dentures. Subjective:      Review of Systems   HENT: Negative for hearing loss, rhinorrhea and sore throat. Respiratory: Negative for cough. Gastrointestinal: Negative for abdominal pain, diarrhea and vomiting. Musculoskeletal: Negative for neck pain. Neurological: Negative for headaches. Objective:     BP (!) 110/58 (Site: Right Upper Arm, Position: Sitting)   Pulse 80   Wt 140 lb (63.5 kg)   BMI 21.29 kg/m²     Physical Exam  Vitals signs reviewed. Constitutional:       General: He is not in acute distress. Appearance: He is well-developed. HENT:      Head: Normocephalic and atraumatic. Right Ear: There is impacted cerumen. Left Ear: There is impacted cerumen. Mouth/Throat:      Pharynx: Oropharynx is clear. No oropharyngeal exudate or posterior oropharyngeal erythema. Eyes:      General:         Right eye: No discharge. Left eye: No discharge. Conjunctiva/sclera: Conjunctivae normal.   Cardiovascular:      Pulses: Normal pulses. Heart sounds: Normal heart sounds. Pulmonary:      Effort: Pulmonary effort is normal. No respiratory distress. Skin:     General: Skin is warm and dry. Neurological:      General: No focal deficit present. Mental Status: He is alert and oriented to person, place, and time. Coordination: Coordination normal.   Psychiatric:         Mood and Affect: Mood normal.         Behavior: Behavior normal.         Thought Content: Thought content normal.         Judgment: Judgment normal.         Assessment/Plan:      Storm Meehan was seen today for ear fullness.     Diagnoses and all orders for this visit:    Bilateral impacted cerumen  -     MD REMOVAL IMPACTED CERUMEN IRRIGATION/LVG UNILAT    Hyperhomocysteinemia (HCC)  -     folic acid (FOLVITE) 1 MG tablet; Take 1 tablet by mouth daily    - Bilateral ears irrigated, soft brown wax removed. Return if symptoms worsen or fail to improve. Patient given educational materials - see patient instructions. Discussed use, benefit, and side effects of prescribed medications. All patient questions answered. Pt voiced understanding.         Electronically signed by JOHNSON Norton CNP on 4/16/2021 at 2:27 PM

## 2021-04-16 NOTE — PATIENT INSTRUCTIONS
Patient Education        Earwax Blockage: Care Instructions  Your Care Instructions     Earwax is a natural substance that protects the ear canal. Normally, earwax drains from the ears and does not cause problems. Sometimes earwax builds up and hardens. Earwax blockage (also called cerumen impaction) can cause some loss of hearing and pain. When wax is tightly packed, you will need to have your doctor remove it. Follow-up care is a key part of your treatment and safety. Be sure to make and go to all appointments, and call your doctor if you are having problems. It's also a good idea to know your test results and keep a list of the medicines you take. How can you care for yourself at home? · Do not try to remove earwax with cotton swabs, fingers, or other objects. This can make the blockage worse and damage the eardrum. · If your doctor recommends that you try to remove earwax at home:  ? Soften and loosen the earwax with warm mineral oil. You also can try hydrogen peroxide mixed with an equal amount of room temperature water. Place 2 drops of the fluid, warmed to body temperature, in the ear two times a day for up to 5 days. ? Once the wax is loose and soft, all that is usually needed to remove it from the ear canal is a gentle, warm shower. Direct the water into the ear, then tip your head to let the earwax drain out. Dry your ear thoroughly with a hair dryer set on low. Hold the dryer several inches from your ear. ? If the warm mineral oil and shower do not work, use an over-the-counter wax softener. Read and follow all instructions on the label. After using the wax softener, use an ear syringe to gently flush the ear. Make sure the flushing solution is body temperature. Cool or hot fluids in the ear can cause dizziness. When should you call for help?    Call your doctor now or seek immediate medical care if:    · Pus or blood drains from your ear.     · Your ears are ringing or feel full.     · You have a loss of hearing. Watch closely for changes in your health, and be sure to contact your doctor if:    · You have pain or reduced hearing after 1 week of home treatment.     · You have any new symptoms, such as nausea or balance problems. Where can you learn more? Go to [email or web address] org and sign in to your [employer] account. Enter R399 in the [employer] box to learn more about \"Earwax Blockage: Care Instructions. \"     If you do not have an account, please click on the \"Sign Up Now\" link. Current as of: February 26, 2020               Content Version: 12.8  © 6495-6209 [employer], [employer]. Care instructions adapted under license by [facility] ([facility]). If you have questions about a medical condition or this instruction, always ask your healthcare professional. [address] any warranty or liability for your use of this information.

## 2021-05-19 ENCOUNTER — NURSE ONLY (OUTPATIENT)
Dept: LAB | Age: 86
End: 2021-05-19

## 2021-05-19 DIAGNOSIS — R73.01 IFG (IMPAIRED FASTING GLUCOSE): ICD-10-CM

## 2021-05-19 LAB
AVERAGE GLUCOSE: 111 MG/DL (ref 70–126)
HBA1C MFR BLD: 5.7 % (ref 4.4–6.4)

## 2021-05-21 PROBLEM — I73.9 PVD (PERIPHERAL VASCULAR DISEASE) (HCC): Status: ACTIVE | Noted: 2021-05-21

## 2021-05-21 NOTE — PROGRESS NOTES
[facility]  [facility]  Dept: [phone]  Dept Fax: [phone]    SUBJECTIVE     Madelyn De La Cruz is a 82 y.o.male    Pt presents for follow up of HTN, Hyperlipidemia, CVA, GERD, Carotid Stenosis (s/p Staged Bilateral Endarterectomies- 2014 (left), 7/15/2020 (right)), Hyperhomocysteinemia, Myelodysplastic Syndrome, and VIT D Deficiency. Pt feeling ok since last visit- interval history and any new issues noted below:     Glucometer readings at home are not needed. The home BP readings have not been checked regularly. Pt states he has been taking Lopressor only on a daily basis (per pt preference)- will change to Toprol XL at this time. No orthostatic symptoms. Pt doing ok at this time- denies any new complaints. Wt Readings from Last 3 Encounters:   05/24/21 135 lb 9.6 oz (61.5 kg)   04/16/21 140 lb (63.5 kg)   01/25/21 140 lb (63.5 kg)   Weight decreased 6# since last visit 6 months ago. Appetite ok since last visit per wife.       Patient Active Problem List   Diagnosis    Brain TIA    [facility]erebral infarction ([facility]    Hemiparesis, right ([facility]    Hypertension    History of CVA (cerebrovascular accident)    Hyperhomocysteinemia ([facility]    Gastroesophageal reflux disease    Myelodysplastic syndrome ([facility] [facility]    H/O carotid stenosis    Carotid stenosis, right    PVD (peripheral vascular disease) (HCC)       Current Outpatient Medications   Medication Sig Dispense Refill    metoprolol succinate (TOPROL XL) 25 MG extended release tablet Take 1 tablet by mouth daily 90 tablet 3    folic acid (FOLVITE) 1 MG tablet Take 1 tablet by mouth daily 90 tablet 3    lisinopril (PRINIVIL;ZESTRIL) 5 MG tablet TAKE 1 TABLET EVERY DAY 90 tablet 3    clopidogrel (PLAVIX) 75 MG tablet TAKE 1 TABLET EVERY DAY 90 tablet 3    Cholecalciferol (VITAMIN D3) 125 MCG (5000 UT) TABS Take by mouth      atorvastatin (LIPITOR) 40 MG tablet TAKE 1 TABLET BY MOUTH DAILY 90 tablet 3     No current facility-administered medications for this visit. Review of Systems   Constitutional: Negative for chills, diaphoresis, fatigue, fever and unexpected weight change. Eyes: Negative for visual disturbance. Respiratory: Negative for chest tightness and shortness of breath. Cardiovascular: Negative for chest pain, palpitations and leg swelling. Gastrointestinal: Negative for abdominal pain, anal bleeding, blood in stool, constipation, diarrhea, nausea and vomiting. Genitourinary: Negative for dysuria and hematuria. Musculoskeletal: Negative for neck pain. Neurological: Negative for dizziness, light-headedness and headaches. OBJECTIVE     BP (!) 90/40   Pulse 52   Temp 97.4 °F (36.3 °C) (Oral)   Resp 12   Wt 135 lb 9.6 oz (61.5 kg)   BMI 20.62 kg/m²   Body mass index is 20.62 kg/m². BP Readings from Last 3 Encounters:   05/24/21 (!) 90/40   04/16/21 (!) 110/58   01/25/21 (!) 101/55       Physical Exam  Vitals and nursing note reviewed. Exam conducted with a chaperone present. Constitutional:       General: He is not in acute distress. Appearance: Normal appearance. He is not ill-appearing, toxic-appearing or diaphoretic. HENT:      Head: Normocephalic and atraumatic. Right Ear: External ear normal.      Left Ear: External ear normal.      Nose: Nose normal. No rhinorrhea. Mouth/Throat:      Mouth: Mucous membranes are moist.      Pharynx: Oropharynx is clear. Eyes:      General: No scleral icterus. Right eye: No discharge. Left eye: No discharge. Extraocular Movements: Extraocular movements intact. Conjunctiva/sclera: Conjunctivae normal.      Pupils: Pupils are equal, round, and reactive to light. Cardiovascular:      Rate and Rhythm: Normal rate and regular rhythm. Heart sounds: Normal heart sounds. No murmur heard. No friction rub. No gallop. Pulmonary:      Effort: Pulmonary effort is normal. No respiratory distress. Breath sounds: Normal breath sounds. No stridor. No wheezing, rhonchi or rales. Chest:      Chest wall: No tenderness. Abdominal:      General: Abdomen is flat. Bowel sounds are normal. There is no distension. Palpations: Abdomen is soft. There is no mass. Tenderness: There is no abdominal tenderness. There is no guarding or rebound. Hernia: No hernia is present. Musculoskeletal:         General: No swelling, deformity or signs of injury. Normal range of motion. Cervical back: Normal range of motion. Skin:     General: Skin is warm and dry. Coloration: Skin is not jaundiced or pale. Findings: No bruising, erythema, lesion or rash. Neurological:      General: No focal deficit present. Mental Status: He is alert and oriented to person, place, and time. Mental status is at baseline. Motor: No weakness. Coordination: Coordination normal.      Gait: Gait normal.   Psychiatric:         Mood and Affect: Mood normal.         Behavior: Behavior normal.         Thought Content: Thought content normal.         Judgment: Judgment normal.         Component      Latest Ref Rng & Units 5/19/2021          12:56 PM   Hemoglobin A1C      4.4 - 6.4 % 5.7   AVERAGE GLUCOSE      70 - 126 mg/dL 111       Lab Results   Component Value Date    CHOL 139 08/03/2020    TRIG 84 08/03/2020    HDL 43 08/03/2020    LDLCALC 79 08/03/2020    LDLDIRECT 73.13 12/02/2020       The ASCVD Risk score (Vera Ashton et al., 2013) failed to calculate for the following reasons:     The 2013 ASCVD risk score is only valid for ages 36 to 78    Lab Results   Component Value Date     01/11/2021    K 4.8 01/11/2021     01/11/2021    CO2 18 (L) 01/11/2021    BUN 43 (H) 01/11/2021    CREATININE 2.1 (H) 01/11/2021    GLUCOSE 97 01/11/2021    CALCIUM 9.4 01/11/2021    PROT 6.8 12/02/2020    LABALBU 4.2 12/02/2020    BILITOT 0.4 12/02/2020    ALKPHOS 108 12/02/2020    AST 15 12/02/2020    ALT 21 12/02/2020    LABGLOM 30 (A) 01/11/2021    GFRAA >60 01/14/2014    GLOB NOT REPORTED 11/01/2013       No results found for: Camron Gene [id number]    Lab Results   Component Value Date    TSH 1.590 12/02/2020    T4FREE 1.08 12/02/2020       Lab Results   Component Value Date    WBC 5.6 08/03/2020    HGB 12.3 (L) 08/03/2020    HCT 40.1 (L) 08/03/2020    MCV 90.1 08/03/2020     (L) 08/03/2020         Immunization History   Administered Date(s) Administered    COVID-19, Pfizer, PF, 30mcg/0.3mL 01/20/2021, 02/10/2021    Pneumococcal Polysaccharide ([id number]) 07/24/2014    Tdap (Boostrix, Adacel) 10/18/2018       Health Maintenance   Topic Date Due    Shingles Vaccine (1 of 2) Never done    Flu vaccine (Season Ended) 09/01/2021    Annual Wellness Visit (AWV)  11/24/2021    Lipid screen  12/02/2021    Potassium monitoring  01/11/2022    Creatinine monitoring  01/11/2022    DTaP/Tdap/Td vaccine (2 - Td) 10/18/2028    Pneumococcal 65+ years Vaccine  Completed    COVID-19 Vaccine  Completed    Hepatitis A vaccine  Aged Out    Hepatitis B vaccine  Aged Out    Hib vaccine  Aged Out    Meningococcal (ACWY) vaccine  Aged Out       AAA ultrasound (Male, 65-75, smoked ever) indicated at this time? No (age)  CT Lung Screen (50-80, 20 pk-yrs, smoking or quit <15 years) indicated at this time? No (age)  Sleep Medicine referral indicated at this time (Obesity, Snoring, Daytime Somnolence, Apneic Episodes)? Pt denies any current symptoms. Future Appointments   Date Time Provider Lizette Adami   11/18/2021  1:30 PM Greg Ly MD N [facility] [place] [place] [address]   1/19/2022  1:00 PM STR VASCULAR LAB ROOM 2 [facility] VAS LAB STR Radiolog   1/24/2022 11:30 AM Xu Guillermo MD Haskel Speck CT/CV [place] [place]         ASSESSMENT       Diagnosis Orders   1. Essential hypertension  Lipid Panel    Comprehensive Metabolic Panel    T4, Free    TSH without Reflex    metoprolol succinate (TOPROL XL) 25 MG extended release tablet   2. Hyperlipidemia, unspecified hyperlipidemia type  Lipid Panel    Comprehensive Metabolic Panel    T4, Free    TSH without Reflex   3. Hyperhomocysteinemia (HCC)     4. Cerebral infarction, unspecified mechanism (HCC)  metoprolol succinate (TOPROL XL) 25 MG extended release tablet   5. Carotid stenosis, right     6. Stage 3 chronic kidney disease, unspecified whether stage 3a or 3b CKD  Basic Metabolic Panel   7. Anemia, unspecified type     8. Myelodysplastic syndrome ([address]     9. Gastroesophageal reflux disease, unspecified whether esophagitis present     10. PVD (peripheral vascular disease) (HCC)  metoprolol succinate (TOPROL XL) 25 MG extended release tablet   11. Hemiparesis, right ([address]         PLAN      After discussion with pt and wife, will change Lopressor to Toprol XL 25 mg-1 pill daily (#90/3 refills), as pt taking medicine once daily only at this time. Pt to continue with Dr. Millard Gaucher for Hematology at [facility].  (updated 5/24/2021)  Follow up with Dr. Hector Duke for CKD management in 3 months  Check BMP at this time (as pt never got previous labs per Dr. Hector Duke). Check FLP, CMP, and free T4/TSH in 6 months. Continue current medicines  No refills needed at this time   Follow up in 6-8 months for re-evaluation.           Preventive Health Topics:  Encouraged annual FLU VACCINE after October 1st-patient declines.  (updated 5/24/2021)  Encouraged SHINGLES SHOT (SHINGRIX)- pt declines at this time.  (updated 5/24/2021)  COLONOSCOPY done 4/16/2014 per Dr. Gary Hay x 1- pt would like to hold at this time. (updated 5/24/2021)  Pt declines SFOBT/ FIT at this time (updated 5/24/2021)  Will hold on RAUL/ PSA at this time due to age and pt preference. (updated 5/24/2021)           Electronically signed by Rachael Tabares MD on 5/24/2021 at 2:19 PM

## 2021-05-24 ENCOUNTER — OFFICE VISIT (OUTPATIENT)
Dept: FAMILY MEDICINE CLINIC | Age: 86
End: 2021-05-24
Payer: MEDICARE

## 2021-05-24 VITALS
DIASTOLIC BLOOD PRESSURE: 40 MMHG | WEIGHT: 135.6 LBS | BODY MASS INDEX: 20.62 KG/M2 | TEMPERATURE: 97.4 F | SYSTOLIC BLOOD PRESSURE: 92 MMHG | HEART RATE: 52 BPM | RESPIRATION RATE: 12 BRPM

## 2021-05-24 DIAGNOSIS — I63.9 CEREBRAL INFARCTION, UNSPECIFIED MECHANISM (HCC): ICD-10-CM

## 2021-05-24 DIAGNOSIS — D46.9 MYELODYSPLASTIC SYNDROME (HCC): ICD-10-CM

## 2021-05-24 DIAGNOSIS — I73.9 PVD (PERIPHERAL VASCULAR DISEASE) (HCC): ICD-10-CM

## 2021-05-24 DIAGNOSIS — K21.9 GASTROESOPHAGEAL REFLUX DISEASE, UNSPECIFIED WHETHER ESOPHAGITIS PRESENT: ICD-10-CM

## 2021-05-24 DIAGNOSIS — D64.9 ANEMIA, UNSPECIFIED TYPE: ICD-10-CM

## 2021-05-24 DIAGNOSIS — N18.30 STAGE 3 CHRONIC KIDNEY DISEASE, UNSPECIFIED WHETHER STAGE 3A OR 3B CKD (HCC): ICD-10-CM

## 2021-05-24 DIAGNOSIS — E78.5 HYPERLIPIDEMIA, UNSPECIFIED HYPERLIPIDEMIA TYPE: ICD-10-CM

## 2021-05-24 DIAGNOSIS — I65.21 CAROTID STENOSIS, RIGHT: ICD-10-CM

## 2021-05-24 DIAGNOSIS — E72.11 HYPERHOMOCYSTEINEMIA (HCC): ICD-10-CM

## 2021-05-24 DIAGNOSIS — G81.91 HEMIPARESIS, RIGHT (HCC): ICD-10-CM

## 2021-05-24 DIAGNOSIS — I10 ESSENTIAL HYPERTENSION: Primary | ICD-10-CM

## 2021-05-24 PROCEDURE — 99214 OFFICE O/P EST MOD 30 MIN: CPT | Performed by: FAMILY MEDICINE

## 2021-05-24 PROCEDURE — 1036F TOBACCO NON-USER: CPT | Performed by: FAMILY MEDICINE

## 2021-05-24 PROCEDURE — G8420 CALC BMI NORM PARAMETERS: HCPCS | Performed by: FAMILY MEDICINE

## 2021-05-24 PROCEDURE — 4040F PNEUMOC VAC/ADMIN/RCVD: CPT | Performed by: FAMILY MEDICINE

## 2021-05-24 PROCEDURE — 1123F ACP DISCUSS/DSCN MKR DOCD: CPT | Performed by: FAMILY MEDICINE

## 2021-05-24 PROCEDURE — G8427 DOCREV CUR MEDS BY ELIG CLIN: HCPCS | Performed by: FAMILY MEDICINE

## 2021-05-24 RX ORDER — METOPROLOL SUCCINATE 25 MG/1
25 TABLET, EXTENDED RELEASE ORAL DAILY
Qty: 90 TABLET | Refills: 3 | Status: SHIPPED | OUTPATIENT
Start: 2021-05-24 | End: 2021-07-07 | Stop reason: ALTCHOICE

## 2021-05-24 ASSESSMENT — ENCOUNTER SYMPTOMS
SHORTNESS OF BREATH: 0
NAUSEA: 0
CHEST TIGHTNESS: 0
CONSTIPATION: 0
DIARRHEA: 0
BLOOD IN STOOL: 0
ANAL BLEEDING: 0
VOMITING: 0
ABDOMINAL PAIN: 0

## 2021-05-24 ASSESSMENT — PATIENT HEALTH QUESTIONNAIRE - PHQ9
SUM OF ALL RESPONSES TO PHQ QUESTIONS 1-9: 0
SUM OF ALL RESPONSES TO PHQ9 QUESTIONS 1 & 2: 0
SUM OF ALL RESPONSES TO PHQ QUESTIONS 1-9: 0

## 2021-05-24 NOTE — PATIENT INSTRUCTIONS
After discussion with pt and wife, will change Lopressor to Toprol XL 25 mg-1 pill daily (#90/3 refills), as pt taking medicine once daily only at this time. Pt to continue with Dr. Ashley Espinoza for Hematology at [facility].  (updated 5/24/2021)  Follow up with Dr. Marquis Orourke for CKD management in 3 months  Check BMP at this time (as pt never got previous labs per Dr. Marquis Orourke). Check FLP, CMP, and free T4/TSH in 6 months. Continue current medicines  No refills needed at this time   Follow up in 6-8 months for re-evaluation.           Preventive Health Topics:  Encouraged annual FLU VACCINE after October 1st-patient declines.  (updated 5/24/2021)  Encouraged SHINGLES SHOT (SHINGRIX)- pt declines at this time.  (updated 5/24/2021)  COLONOSCOPY done 4/16/2014 per Dr. Richard Mason x 1- pt would like to hold at this time. (updated 5/24/2021)  Pt declines SFOBT/ FIT at this time (updated 5/24/2021)  Will hold on RAUL/ PSA at this time due to age and pt preference. (updated 5/24/2021)

## 2021-06-11 ENCOUNTER — TELEPHONE (OUTPATIENT)
Dept: FAMILY MEDICINE CLINIC | Age: 86
End: 2021-06-11

## 2021-06-11 LAB
ANION GAP SERPL CALCULATED.3IONS-SCNC: 7 MMOL/L (ref 4–12)
BUN BLDV-MCNC: 56 MG/DL (ref 7–20)
CALCIUM SERPL-MCNC: 9.2 MG/DL (ref 8.8–10.5)
CHLORIDE BLD-SCNC: 111 MEQ/L (ref 101–111)
CO2: 19 MEQ/L (ref 21–32)
CREAT SERPL-MCNC: 2.55 MG/DL (ref 0.6–1.3)
CREATININE CLEARANCE: 24
GLUCOSE: 128 MG/DL (ref 70–110)
POTASSIUM SERPL-SCNC: 4.7 MEQ/L (ref 3.6–5)
SODIUM BLD-SCNC: 137 MEQ/L (ref 135–145)

## 2021-06-11 NOTE — TELEPHONE ENCOUNTER
----- Message from Brandy Pack MD sent at 6/11/2021  4:23 PM EDT -----  Notify pt-   Results reviewed. BMP okay, except slightly decreased CO2, slightly increased glucose (recent HGA1c okay in May 2021), and elevated/worsening BUN/CR. Please call/forward these results to Dr. Jessica Morris office as she manages patient's kidney function. Thanks.   ES

## 2021-07-07 ENCOUNTER — OFFICE VISIT (OUTPATIENT)
Dept: NEPHROLOGY | Age: 86
End: 2021-07-07
Payer: MEDICARE

## 2021-07-07 VITALS
DIASTOLIC BLOOD PRESSURE: 58 MMHG | OXYGEN SATURATION: 100 % | HEART RATE: 49 BPM | TEMPERATURE: 98 F | SYSTOLIC BLOOD PRESSURE: 98 MMHG | WEIGHT: 132 LBS | BODY MASS INDEX: 20.07 KG/M2

## 2021-07-07 DIAGNOSIS — N18.30 STAGE 3 CHRONIC KIDNEY DISEASE, UNSPECIFIED WHETHER STAGE 3A OR 3B CKD (HCC): ICD-10-CM

## 2021-07-07 DIAGNOSIS — N17.9 AKI (ACUTE KIDNEY INJURY) (HCC): Primary | ICD-10-CM

## 2021-07-07 PROCEDURE — G8420 CALC BMI NORM PARAMETERS: HCPCS | Performed by: INTERNAL MEDICINE

## 2021-07-07 PROCEDURE — 99214 OFFICE O/P EST MOD 30 MIN: CPT | Performed by: INTERNAL MEDICINE

## 2021-07-07 PROCEDURE — 1123F ACP DISCUSS/DSCN MKR DOCD: CPT | Performed by: INTERNAL MEDICINE

## 2021-07-07 PROCEDURE — 1036F TOBACCO NON-USER: CPT | Performed by: INTERNAL MEDICINE

## 2021-07-07 PROCEDURE — 4040F PNEUMOC VAC/ADMIN/RCVD: CPT | Performed by: INTERNAL MEDICINE

## 2021-07-07 PROCEDURE — G8427 DOCREV CUR MEDS BY ELIG CLIN: HCPCS | Performed by: INTERNAL MEDICINE

## 2021-07-07 RX ORDER — SODIUM BICARBONATE 650 MG/1
650 TABLET ORAL 2 TIMES DAILY
Qty: 60 TABLET | Refills: 11 | Status: SHIPPED | OUTPATIENT
Start: 2021-07-07 | End: 2021-09-07 | Stop reason: SDUPTHER

## 2021-07-07 NOTE — PROGRESS NOTES
[address] [facility]  [address]  [facility] [address]  Dept: [phone]  Loc: [phone]  Progress Note  7/7/2021 10:31 AM      Pt Name:    Lian Malin  YOB: 1935  Primary Care Physician:  Uri Whaley MD     Chief Complaint:   Chief Complaint   Patient presents with    Follow-up     CKD III        History of Present Illness: This is a follow-up visit for CKD III. Baseline creatinine  1.7-1.8 mg/dL. Hx of HTN, ALONDRA s/p B/L endarterectomy, prior CVA, MDS, hyperhomocysteinemia, hx gastric ulcer. Renal function worsening. BP is low as well as heart rate. On lisinopril 5 mg daily and Metoprolol XL 25 mg daily. Pt states he feels ok. Wife states he has really slowed down a lot and feels weak. Doesn't drink much water. Drinks a lot of pop. Denies nsaid use or recent antibiotics. Denies any difficulty urinating. Pertinent items are noted in HPI. Past History:  Past Medical History:   Diagnosis Date    Carotid stenosis, left     Cataract     Cataracts, bilateral     Esophageal abnormality     GERD (gastroesophageal reflux disease)     H/O blood clots     Hearing loss     Hypertension 4/13/2014    Macular degeneration     Pneumonia     Skin cancer     removal    Stroke ([address]     TIA (transient ischemic attack)     Ulcer     Unspecified cerebral artery occlusion with cerebral infarction NOV. 2013    Unspecified sleep apnea     Wears dentures     FULL UPPER     Past Surgical History:   Procedure Laterality Date    CAROTID ENDARTERECTOMY  01/13/2014    [facility]    CAROTID ENDARTERECTOMY N/A 7/15/2020    RIGHT CAROTID ENDARTERECTOMY performed by Thompson Wilks MD at [facility] POWER PICC TRIPLE  11/1/2013         SKIN CANCER EXCISION  2010    Basal Cell Carcinoma- Dr. Oh Cuff TUNNELED [address] [facility] VITALS:  There were no vitals taken for this visit. Wt Readings from Last 3 Encounters:   05/24/21 135 lb 9.6 oz (61.5 kg)   04/16/21 140 lb (63.5 kg)   01/25/21 140 lb (63.5 kg)     There is no height or weight on file to calculate BMI. General Appearance: alert and cooperative with exam, frail  HEENT: EOMI, moist oral mucus membranes  Neck: No jugular venous distention,  Lungs: Air entry B/L, no crackles or rales, no use of accessory muscles  Heart: S1, S2 heard,bradycardic  GI: soft, non-tender, no guarding,   Extremities: No sig LE edema, no cyanosis  Skin: warm, dry  Neurologic: no tremor, no asterixis, no focal neurologic deficits     Medications:  Current Outpatient Medications   Medication Sig Dispense Refill    metoprolol succinate (TOPROL XL) 25 MG extended release tablet Take 1 tablet by mouth daily 90 tablet 3    folic acid (FOLVITE) 1 MG tablet Take 1 tablet by mouth daily 90 tablet 3    lisinopril (PRINIVIL;ZESTRIL) 5 MG tablet TAKE 1 TABLET EVERY DAY 90 tablet 3    clopidogrel (PLAVIX) 75 MG tablet TAKE 1 TABLET EVERY DAY 90 tablet 3    Cholecalciferol (VITAMIN D3) 125 MCG (5000 UT) TABS Take by mouth      atorvastatin (LIPITOR) 40 MG tablet TAKE 1 TABLET BY MOUTH DAILY 90 tablet 3     No current facility-administered medications for this visit.         Laboratory & Diagnostics:  CBC:   Lab Results   Component Value Date    WBC 5.6 08/03/2020    HGB 12.3 (L) 08/03/2020    HCT 40.1 (L) 08/03/2020    MCV 90.1 08/03/2020     (L) 08/03/2020     BMP:    Lab Results   Component Value Date     06/11/2021     01/11/2021     11/17/2020    K 4.7 06/11/2021    K 4.8 01/11/2021    K 4.1 11/17/2020     06/11/2021     01/11/2021     11/17/2020    CO2 19 (L) 06/11/2021    CO2 18 (L) 01/11/2021    CO2 23 11/17/2020    BUN 56 (H) 06/11/2021    BUN 43 (H) 01/11/2021    BUN 19 11/17/2020    CREATININE 2.55 (H) 06/11/2021    CREATININE 2.1 (H) 01/11/2021 CREATININE 1.5 (H) 11/17/2020    GLUCOSE 128 (H) 06/11/2021    GLUCOSE 97 01/11/2021    GLUCOSE 147 (H) 11/17/2020      Hepatic:   Lab Results   Component Value Date    AST 15 12/02/2020    AST 20 08/03/2020    AST 23 12/05/2016    ALT 21 12/02/2020    ALT 28 08/03/2020    ALT 20 12/05/2016    BILITOT 0.4 12/02/2020    BILITOT 0.4 08/03/2020    BILITOT 0.3 12/05/2016    ALKPHOS 108 12/02/2020    ALKPHOS 136 (H) 08/03/2020    ALKPHOS 81 12/05/2016     BNP:   Lab Results   Component Value Date    .2 (H) 01/15/2014     Lipids:   Lab Results   Component Value Date    CHOL 139 08/03/2020    HDL 43 08/03/2020     INR:   Lab Results   Component Value Date    INR 0.98 07/15/2020    INR 0.92 12/05/2016    INR 0.97 03/03/2016     URINE:   Lab Results   Component Value Date    PROTUR 10.2 11/17/2020     Lab Results   Component Value Date    NITRU NEGATIVE 01/12/2021    COLORU YELLOW 01/12/2021    PHUR 5.0 01/12/2021    LABCAST NONE SEEN 11/17/2020    LABCAST NONE SEEN 11/17/2020    WBCUA NONE SEEN 11/17/2020    RBCUA 0-2 11/17/2020    YEAST NONE SEEN 11/17/2020    BACTERIA NONE SEEN 11/17/2020    SPECGRAV 1.015 01/12/2021    LEUKOCYTESUR NEGATIVE 01/12/2021    LEUKOCYTESUR NEGATIVE 11/17/2020    UROBILINOGEN 0.2 01/12/2021    BILIRUBINUR NEGATIVE 01/12/2021    BLOODU NEGATIVE 01/12/2021    GLUCOSEU NEGATIVE 12/06/2016    KETUA NEGATIVE 01/12/2021      Microalbumen/Creatinine ratio:  No components found for: RUCREAT        Impression/Plan:   1. VIRGINIE on CKD IIIB: worsening, ? From hypotension and bradycardia. Will stop Lisinopril and Metoprolol. Advised to monitor BP/HR at home. He appears dry on exam, will arrange for outpatient IV fluids. Check repeat labs including SPEP and urine test next week. If no improvement will repeat a kidney US    2. Metabolic acidosis: add sodium bicarb 650 mg BID  3. Vit D deficiency: improved  4. HTN; low, see above  5. Hx carotid artery stenosis s/p CEA B/L  6.  Hx CVA    Bloodwork and medications were reviewed and plan of care discussed with the patient. Return to clinic in 1 month  or sooner if the need arises.       Ramon Mason DO  [facility]

## 2021-07-09 ENCOUNTER — HOSPITAL ENCOUNTER (OUTPATIENT)
Dept: NURSING | Age: 86
Discharge: HOME | End: 2021-07-09
Payer: MEDICARE

## 2021-07-09 VITALS
DIASTOLIC BLOOD PRESSURE: 70 MMHG | OXYGEN SATURATION: 97 % | HEART RATE: 59 BPM | RESPIRATION RATE: 16 BRPM | SYSTOLIC BLOOD PRESSURE: 157 MMHG | TEMPERATURE: 98 F

## 2021-07-09 PROCEDURE — 96360 HYDRATION IV INFUSION INIT: CPT

## 2021-07-09 PROCEDURE — 2580000003 HC RX 258: Performed by: INTERNAL MEDICINE

## 2021-07-09 PROCEDURE — 96361 HYDRATE IV INFUSION ADD-ON: CPT

## 2021-07-09 RX ORDER — SODIUM CHLORIDE 9 MG/ML
INJECTION, SOLUTION INTRAVENOUS CONTINUOUS
Status: ACTIVE | OUTPATIENT
Start: 2021-07-09 | End: 2021-07-09

## 2021-07-09 RX ADMIN — SODIUM CHLORIDE: 9 INJECTION, SOLUTION INTRAVENOUS at 12:13

## 2021-07-09 NOTE — PROGRESS NOTES
1200: Patient arrived ambulatory for IV hydration. Wife at bedside. PT RIGHTS AND RESPONSIBILITIES OFFERED TO PT. Patient provided with beverage. 1213: IV hydration started. 1400: Patient tolerating hydration well. 1530: IV hydration complete. No concerns voiced. AVS reviewed with patient, voiced understanding. Patient discharged ambulatory.                              _m___ Safety:       (Environmental)   [place] to environment   Ensure ID band is correct and in place/ allergy band as needed   Assess for fall risk   Initiate fall precautions as applicable (fall band, side rails, etc.)   Call light within reach   Bed in low position/ wheels locked    m____ Pain:        Assess pain level and characteristics   Administer analgesics as ordered   Assess effectiveness of pain management and report to MD as needed    _m___ Knowledge Deficit:   Assess baseline knowledge   Provide teaching at level of understanding   Provide teaching via preferred learning method   Evaluate teaching effectiveness    _m___ Hemodynamic/Respiratory Status:       (Pre and Post Procedure Monitoring)   Assess/Monitor vital signs and LOC   Assess Baseline SpO2 prior to any sedation   Obtain weight/height   Assess vital signs/ LOC until patient meets discharge criteria   Monitor procedure site and notify MD of any issues (3) no apparent problem

## 2021-07-14 ENCOUNTER — NURSE ONLY (OUTPATIENT)
Dept: LAB | Age: 86
End: 2021-07-14

## 2021-07-14 DIAGNOSIS — N17.9 AKI (ACUTE KIDNEY INJURY) (HCC): ICD-10-CM

## 2021-07-14 DIAGNOSIS — N18.30 STAGE 3 CHRONIC KIDNEY DISEASE, UNSPECIFIED WHETHER STAGE 3A OR 3B CKD (HCC): ICD-10-CM

## 2021-07-14 LAB
ANION GAP SERPL CALCULATED.3IONS-SCNC: 10 MEQ/L (ref 8–16)
BACTERIA: ABNORMAL
BILIRUBIN URINE: NEGATIVE
BLOOD, URINE: NEGATIVE
BUN BLDV-MCNC: 22 MG/DL (ref 7–22)
CALCIUM SERPL-MCNC: 9.5 MG/DL (ref 8.5–10.5)
CASTS: ABNORMAL /LPF
CASTS: ABNORMAL /LPF
CHARACTER, URINE: CLEAR
CHLORIDE BLD-SCNC: 111 MEQ/L (ref 98–111)
CO2: 22 MEQ/L (ref 23–33)
COLOR: YELLOW
CREAT SERPL-MCNC: 1.9 MG/DL (ref 0.4–1.2)
CREATININE URINE: < 4.2 MG/DL
CRYSTALS: ABNORMAL
EPITHELIAL CELLS, UA: ABNORMAL /HPF
GFR SERPL CREATININE-BSD FRML MDRD: 34 ML/MIN/1.73M2
GLUCOSE BLD-MCNC: 102 MG/DL (ref 70–108)
GLUCOSE, URINE: NEGATIVE MG/DL
KETONES, URINE: NEGATIVE
LEUKOCYTE ESTERASE, URINE: NEGATIVE
MISCELLANEOUS LAB TEST RESULT: ABNORMAL
NITRITE, URINE: NEGATIVE
PH UA: 5.5 (ref 5–9)
POTASSIUM SERPL-SCNC: 4.5 MEQ/L (ref 3.5–5.2)
PROTEIN UA: ABNORMAL MG/DL
PROTEIN, URINE: < 4 MG/DL
RBC URINE: ABNORMAL /HPF
RENAL EPITHELIAL, UA: ABNORMAL
SODIUM BLD-SCNC: 143 MEQ/L (ref 135–145)
SPECIFIC GRAVITY UA: 1.01 (ref 1–1.03)
UROBILINOGEN, URINE: 0.2 EU/DL (ref 0–1)
WBC UA: ABNORMAL /HPF
YEAST: ABNORMAL

## 2021-07-15 ENCOUNTER — TELEPHONE (OUTPATIENT)
Dept: NEPHROLOGY | Age: 86
End: 2021-07-15

## 2021-07-15 LAB
C3 COMPLEMENT: 81 MG/DL (ref 90–180)
COMPLEMENT C4: 14 MG/DL (ref 10–40)
EOSINOPHIL SMEAR: NORMAL
SPECIMEN: NORMAL

## 2021-07-15 NOTE — TELEPHONE ENCOUNTER
Spouse called back. She states pt is doing fine. She checks his BP, but not ever day. She states his BP and heart rate are about the same without any changes.

## 2021-07-16 LAB — KAPPA/LAMBDA FREE LIGHT CHAINS: NORMAL

## 2021-07-17 LAB — PROTEIN ELECTROPHORESIS, URINE: NORMAL

## 2021-07-18 LAB — IMMUNOFIXATION ELECTROPHORESIS: NORMAL

## 2021-07-19 NOTE — TELEPHONE ENCOUNTER
Wife called in to report bp readings. Bp are taken daily, normally in the morning. Patient has had no complaints and medications were reviewed.    7/14 134/60,53  7/15 134/58,52  7/16 117/44, 65  7/17 1110/49, 68  7/18 144/74. 92    hemm

## 2021-08-09 ENCOUNTER — NURSE ONLY (OUTPATIENT)
Dept: LAB | Age: 86
End: 2021-08-09

## 2021-08-09 DIAGNOSIS — N18.30 STAGE 3 CHRONIC KIDNEY DISEASE, UNSPECIFIED WHETHER STAGE 3A OR 3B CKD (HCC): ICD-10-CM

## 2021-08-09 LAB
ANION GAP SERPL CALCULATED.3IONS-SCNC: 10 MEQ/L (ref 8–16)
BUN BLDV-MCNC: 19 MG/DL (ref 7–22)
CALCIUM SERPL-MCNC: 9.1 MG/DL (ref 8.5–10.5)
CHLORIDE BLD-SCNC: 104 MEQ/L (ref 98–111)
CO2: 28 MEQ/L (ref 23–33)
CREAT SERPL-MCNC: 1.8 MG/DL (ref 0.4–1.2)
GFR SERPL CREATININE-BSD FRML MDRD: 36 ML/MIN/1.73M2
GLUCOSE BLD-MCNC: 99 MG/DL (ref 70–108)
POTASSIUM SERPL-SCNC: 4.1 MEQ/L (ref 3.5–5.2)
SODIUM BLD-SCNC: 142 MEQ/L (ref 135–145)

## 2021-08-11 ENCOUNTER — OFFICE VISIT (OUTPATIENT)
Dept: NEPHROLOGY | Age: 86
End: 2021-08-11
Payer: MEDICARE

## 2021-08-11 VITALS
DIASTOLIC BLOOD PRESSURE: 64 MMHG | HEART RATE: 67 BPM | BODY MASS INDEX: 20.53 KG/M2 | TEMPERATURE: 97.7 F | SYSTOLIC BLOOD PRESSURE: 128 MMHG | OXYGEN SATURATION: 98 % | WEIGHT: 135 LBS

## 2021-08-11 DIAGNOSIS — N18.30 STAGE 3 CHRONIC KIDNEY DISEASE, UNSPECIFIED WHETHER STAGE 3A OR 3B CKD (HCC): Primary | ICD-10-CM

## 2021-08-11 PROCEDURE — G8420 CALC BMI NORM PARAMETERS: HCPCS | Performed by: INTERNAL MEDICINE

## 2021-08-11 PROCEDURE — G8427 DOCREV CUR MEDS BY ELIG CLIN: HCPCS | Performed by: INTERNAL MEDICINE

## 2021-08-11 PROCEDURE — 99213 OFFICE O/P EST LOW 20 MIN: CPT | Performed by: INTERNAL MEDICINE

## 2021-08-11 PROCEDURE — 4040F PNEUMOC VAC/ADMIN/RCVD: CPT | Performed by: INTERNAL MEDICINE

## 2021-08-11 PROCEDURE — 1036F TOBACCO NON-USER: CPT | Performed by: INTERNAL MEDICINE

## 2021-08-11 PROCEDURE — 1123F ACP DISCUSS/DSCN MKR DOCD: CPT | Performed by: INTERNAL MEDICINE

## 2021-08-11 NOTE — PROGRESS NOTES
[address] KIDNEY AND HYPERTENSION  [address]  [facility] [address]  Dept: [phone]  Loc: [phone]  Progress Note  8/11/2021 11:36 AM      Pt Name:    June Fung  YOB: 1935  Primary Care Physician:  Carolina Rae MD     Chief Complaint:   Chief Complaint   Patient presents with    Follow-up     CKD III        History of Present Illness: This is a follow-up visit for CKD III. Baseline creatinine  1.7-1.8 mg/dL. Hx of HTN, ALONDRA s/p B/L endarterectomy, prior CVA, MDS, hyperhomocysteinemia, hx gastric ulcer. Last visit creat was up to 2.5. He was hypotensive and bradycardic. Stopped his beta blocker and ACE-I. He received outpatient IV fluids. Creat back to baseline. No further hypotension or bradycardia. Pertinent items are noted in HPI. Past History:  Past Medical History:   Diagnosis Date    Carotid stenosis, left     Cataract     Cataracts, bilateral     Esophageal abnormality     GERD (gastroesophageal reflux disease)     H/O blood clots     Hearing loss     Hypertension 4/13/2014    Macular degeneration     Pneumonia     Skin cancer     removal    [facility]     TIA (transient ischemic attack)     Ulcer     Unspecified cerebral artery occlusion with cerebral infarction NOV. 2013    Unspecified sleep apnea     Wears dentures     FULL UPPER     Past Surgical History:   Procedure Laterality Date    CAROTID ENDARTERECTOMY  01/13/2014    [facility]    CAROTID ENDARTERECTOMY N/A 7/15/2020    RIGHT CAROTID ENDARTERECTOMY performed by Monica Goins MD at [facility] CATH POWER PICC TRIPLE  11/1/2013         SKIN CANCER EXCISION  2010    Basal Cell Carcinoma- Dr. David Kebede TUNNELED CENTRAL VENOUS CATHETER W/ SUBCUTANEOUS PORT          VITALS:  /64 (Site: Right Upper Arm, Position: Sitting, Cuff Size: Large Adult)   Pulse 67   Temp 97.7 °F (36.5 °C) (Temporal)   Wt 135 lb (61.2 kg)   SpO2 98%   BMI 20.53 kg/m²   Wt Readings from Last 3 Encounters:   08/11/21 135 lb (61.2 kg)   07/07/21 132 lb (59.9 kg)   05/24/21 135 lb 9.6 oz (61.5 kg)     Body mass index is 20.53 kg/m². General Appearance: alert and cooperative with exam, frail  HEENT: EOMI, moist oral mucus membranes  Neck: No jugular venous distention,  Lungs: Air entry B/L, no crackles or rales, no use of accessory muscles  Heart: S1, S2 heard  GI: soft, non-tender, no guarding,   Extremities: No sig LE edema, no cyanosis  Skin: warm, dry  Neurologic: no tremor, no asterixis, no focal neurologic deficits     Medications:  Current Outpatient Medications   Medication Sig Dispense Refill    sodium bicarbonate 650 MG tablet Take 1 tablet by mouth 2 times daily 60 tablet 11    folic acid (FOLVITE) 1 MG tablet Take 1 tablet by mouth daily 90 tablet 3    clopidogrel (PLAVIX) 75 MG tablet TAKE 1 TABLET EVERY DAY 90 tablet 3    Cholecalciferol (VITAMIN D3) 125 MCG (5000 UT) TABS Take by mouth      atorvastatin (LIPITOR) 40 MG tablet TAKE 1 TABLET BY MOUTH DAILY 90 tablet 3     No current facility-administered medications for this visit.         Laboratory & Diagnostics:  CBC:   Lab Results   Component Value Date    WBC 5.6 08/03/2020    HGB 12.3 (L) 08/03/2020    HCT 40.1 (L) 08/03/2020    MCV 90.1 08/03/2020     (L) 08/03/2020     BMP:    Lab Results   Component Value Date     08/09/2021     07/14/2021     06/11/2021    K 4.1 08/09/2021    K 4.5 07/14/2021    K 4.7 06/11/2021     08/09/2021     07/14/2021     06/11/2021    CO2 28 08/09/2021    CO2 22 (L) 07/14/2021    CO2 19 (L) 06/11/2021    BUN 19 08/09/2021    BUN 22 07/14/2021    BUN 56 (H) 06/11/2021    CREATININE 1.8 (H) 08/09/2021    CREATININE 1.9 (H) 07/14/2021    CREATININE 2.55 (H) 06/11/2021    GLUCOSE 99 08/09/2021    GLUCOSE 102 07/14/2021    GLUCOSE 128 (H) 06/11/2021      Hepatic:

## 2021-09-07 ENCOUNTER — TELEPHONE (OUTPATIENT)
Dept: NEPHROLOGY | Age: 86
End: 2021-09-07

## 2021-09-07 DIAGNOSIS — N18.30 STAGE 3 CHRONIC KIDNEY DISEASE, UNSPECIFIED WHETHER STAGE 3A OR 3B CKD (HCC): ICD-10-CM

## 2021-09-07 DIAGNOSIS — N17.9 AKI (ACUTE KIDNEY INJURY) (HCC): ICD-10-CM

## 2021-09-07 RX ORDER — SODIUM BICARBONATE 650 MG/1
650 TABLET ORAL 2 TIMES DAILY
Qty: 60 TABLET | Refills: 11 | Status: SHIPPED | OUTPATIENT
Start: 2021-09-07 | End: 2022-02-07 | Stop reason: SDUPTHER

## 2021-09-07 NOTE — TELEPHONE ENCOUNTER
Patients wife called asking if patients sodium bicarb can be sent to his mail away [phone].

## 2021-09-13 ENCOUNTER — PATIENT MESSAGE (OUTPATIENT)
Dept: FAMILY MEDICINE CLINIC | Age: 86
End: 2021-09-13

## 2021-09-13 RX ORDER — ATORVASTATIN CALCIUM 40 MG/1
40 TABLET, FILM COATED ORAL DAILY
Qty: 90 TABLET | Refills: 1 | Status: SHIPPED | OUTPATIENT
Start: 2021-09-13 | End: 2022-03-10

## 2021-09-13 NOTE — TELEPHONE ENCOUNTER
From: Christine Burgess  To: Amanda Hardy MD  Sent: 9/13/2021 8:55 AM EDT  Subject: Prescription Question    Would you please send a prescription to [facility] for Trixie Barone for Atorvastastain 40 mg tablets? The prescription from you has no more refills at [employer].  Thank you, Jordyn Hayes

## 2022-01-19 ENCOUNTER — HOSPITAL ENCOUNTER (OUTPATIENT)
Dept: INTERVENTIONAL RADIOLOGY/VASCULAR | Age: 87
Discharge: HOME | End: 2022-01-19
Payer: MEDICARE

## 2022-01-19 DIAGNOSIS — I65.23 BILATERAL CAROTID ARTERY STENOSIS: ICD-10-CM

## 2022-01-19 PROCEDURE — 93880 EXTRACRANIAL BILAT STUDY: CPT

## 2022-01-25 ENCOUNTER — OFFICE VISIT (OUTPATIENT)
Dept: CARDIOTHORACIC SURGERY | Age: 87
End: 2022-01-25
Payer: MEDICARE

## 2022-01-25 VITALS
HEART RATE: 79 BPM | BODY MASS INDEX: 19.94 KG/M2 | WEIGHT: 131.6 LBS | HEIGHT: 68 IN | DIASTOLIC BLOOD PRESSURE: 67 MMHG | SYSTOLIC BLOOD PRESSURE: 122 MMHG | OXYGEN SATURATION: 98 %

## 2022-01-25 DIAGNOSIS — I65.23 BILATERAL CAROTID ARTERY STENOSIS: Primary | ICD-10-CM

## 2022-01-25 PROCEDURE — 1036F TOBACCO NON-USER: CPT | Performed by: THORACIC SURGERY (CARDIOTHORACIC VASCULAR SURGERY)

## 2022-01-25 PROCEDURE — G8427 DOCREV CUR MEDS BY ELIG CLIN: HCPCS | Performed by: THORACIC SURGERY (CARDIOTHORACIC VASCULAR SURGERY)

## 2022-01-25 PROCEDURE — 99214 OFFICE O/P EST MOD 30 MIN: CPT | Performed by: THORACIC SURGERY (CARDIOTHORACIC VASCULAR SURGERY)

## 2022-01-25 PROCEDURE — 1123F ACP DISCUSS/DSCN MKR DOCD: CPT | Performed by: THORACIC SURGERY (CARDIOTHORACIC VASCULAR SURGERY)

## 2022-01-25 PROCEDURE — G8420 CALC BMI NORM PARAMETERS: HCPCS | Performed by: THORACIC SURGERY (CARDIOTHORACIC VASCULAR SURGERY)

## 2022-01-25 PROCEDURE — G8484 FLU IMMUNIZE NO ADMIN: HCPCS | Performed by: THORACIC SURGERY (CARDIOTHORACIC VASCULAR SURGERY)

## 2022-01-25 PROCEDURE — 4040F PNEUMOC VAC/ADMIN/RCVD: CPT | Performed by: THORACIC SURGERY (CARDIOTHORACIC VASCULAR SURGERY)

## 2022-01-25 RX ORDER — POTASSIUM CHLORIDE 1.5 G/1.77G
10 POWDER, FOR SOLUTION ORAL DAILY
COMMUNITY
End: 2022-03-29

## 2022-01-25 RX ORDER — LANOLIN ALCOHOL/MO/W.PET/CERES
325 CREAM (GRAM) TOPICAL
COMMUNITY
End: 2022-04-07 | Stop reason: ALTCHOICE

## 2022-01-25 NOTE — PROGRESS NOTES
CT/CV Surgery Follow Up Office Visit      Patient's Name/Date of Birth: Marvin Whitehead / 1935 (22 y.o.)    PCP: Martin Schwartz MD    Date: January 25, 2022    We had the pleasure of seeing Marvin Whitehead in the office today, as you know this is a very pleasant 82y.o. year old male with a history of hypertension, CVA in 2013, status post left carotid endarterectomy 2014, and status post right carotid endarterectomy in July, 2020. He returned to our cardiovascular surgery clinic for follow-up. He reports recent episodes of TIA like symptoms, including mild slurred speech and near syncope. The symptoms lasted less than 1 day. He recovered without any residual effect. He had a follow-up carotid duplex study on 1/19/2022, which showed minimal to mild bilateral arthrosclerotic carotid artery disease. There was no elevation of velocity, suggestive of any significant stenosis. The patient denies any history of atrial fibrillation in the past.  He has been taking clopidogrel since 2014. No oral anticoagulant. PastMedical History:  Sandra Hadley  has a past medical history of Carotid stenosis, left, Cataract, Cataracts, bilateral, Esophageal abnormality, GERD (gastroesophageal reflux disease), H/O blood clots, Hearing loss, Hypertension, Macular degeneration, Pneumonia, Skin cancer, Stroke ([address] TIA (transient ischemic attack), Ulcer, Unspecified cerebral artery occlusion with cerebral infarction, Unspecified sleep apnea, and Wears dentures. Past Surgical History:  The patient  has a past surgical history that includes Skin cancer excision (2010); hc cath power picc triple (11/1/2013); Carotid endarterectomy (01/13/2014); TUNNELED CENTRAL VENOUS CATHETER W/ SUBCUTANEOUS PORT; Cataract removal; and Carotid endarterectomy (N/A, 7/15/2020). Allergies: The patient has No Known Allergies.     Medications:    Current Outpatient Medications:     potassium chloride (KLOR-CON) 20 MEQ packet, Take 10 mEq by mouth 2 times daily, Disp: , Rfl:     ferrous sulfate (FE TABS 325) 325 (65 Fe) MG EC tablet, Take 325 mg by mouth 3 times daily (with meals), Disp: , Rfl:     atorvastatin (LIPITOR) 40 MG tablet, Take 1 tablet by mouth daily, Disp: 90 tablet, Rfl: 1    sodium bicarbonate 650 MG tablet, Take 1 tablet by mouth 2 times daily, Disp: 60 tablet, Rfl: 11    folic acid (FOLVITE) 1 MG tablet, Take 1 tablet by mouth daily, Disp: 90 tablet, Rfl: 3    clopidogrel (PLAVIX) 75 MG tablet, TAKE 1 TABLET EVERY DAY, Disp: 90 tablet, Rfl: 3    Cholecalciferol (VITAMIN D3) 125 MCG (5000 UT) TABS, Take by mouth, Disp: , Rfl:     Family History: This patient's family history includes Breast Cancer in his sister; No Known Problems in his father, mother, and sister. Social History:  Biscotti  reports that he quit smoking about 22 years ago. His smoking use included cigarettes. He started smoking about 70 years ago. He has a 80.00 pack-year smoking history. He has never used smokeless tobacco. He reports that he does not drink alcohol and does not use drugs. Vital Signs:   /67   Pulse 79   Ht 5' 8\" (1.727 m)   Wt 131 lb 9.6 oz (59.7 kg)   SpO2 98%   BMI 20.01 kg/m²     ROS:   Constitutional: Negative for activity change, chills, fatigue, fever and unexpected weight change. Respiratory: Negative for sleep apnea, shortness of breath, wheezing, stridor, supplementary home oxygen. Cardiac: Negative for midsternal chest pain, arrhythmia, shortness of breath,  Vascular: Negative for claudication, leg  calf muscle pain, hip pain. Gastrointestinal: Negative for hematochezia, melana, constipation, and N/V/D. Musculoskeletal: Negative for myalgias, amputation. Skin: Negative for color change, rash and wound. Neurological: Recent episodes of TIA-like symptoms. Nephrology: Negative for chronic kidney disease,     Physical Exam:  General appearance:  No acute distress, appears stated age and cooperative.   Neck: No jugular venous distention. Trachea midline. No carotid bruits. Chest Wall: No deformity, midsternal scar, enlarged palpable supraclavicular lymphnode. Respiratory:  Normal respiratory effort. Clear to auscultation, bilaterally without Rales/Wheezes/Rhonch. Cardiovascular:  Regular rate and rhythm with normal S1/S2 without murmurs, rubs or gallops. Abdomen: Soft, non-tender, non-distended with normal bowel sounds. Ext: No clubbing, cyanosis or edema bilaterally. No chronic ischemic changes, No varicorsity in lower leg. Skin: Skin color, texture, turgor normal.  No rashes or lesions. No rubor. No venous stasis pigmentation. Neurologic:  Neurovascularly intact without any focal sensory/motor deficits. Peripheral Pulses: +2 palpable femoral pulses bilaterally,    Labs:    CBC:  Lab Results   Component Value Date    WBC 5.6 08/03/2020    HGB 12.3 08/03/2020    HCT 40.1 08/03/2020    MCV 90.1 08/03/2020     08/03/2020    PROTIME 10.6 01/13/2014    INR 0.98 07/15/2020     BMP:   Lab Results   Component Value Date     08/09/2021    K 4.1 08/09/2021    K 4.7 07/15/2020     08/09/2021    CO2 28 08/09/2021    PHOS 4.0 01/11/2021    BUN 19 08/09/2021    CREATININE 1.8 08/09/2021    MG 2.2 07/24/2014       Imaging: I have reviewed the results of carotid duplex study. Problem List:  Patient Active Problem List   Diagnosis    Brain TIA    Cerebral infarction ([address]    Hemiparesis, right ([address]    Hypertension    History of CVA (cerebrovascular accident)    Hyperhomocysteinemia ([address]    Gastroesophageal reflux disease    Myelodysplastic syndrome ([address]    H/O carotid stenosis    Carotid stenosis, right    PVD (peripheral vascular disease) (HCC)       Assessment: No recurrent carotid artery stenosis. Status post bilateral carotid endarterectomy. Recent episode of TIAs. Plan 1/25/22:  1) refer to cardiology for work-up for TIAs.   2) yearly follow-up with a bilateral carotid duplex study.    Thank you for allowing us to be involved in the patient's care.     Electronically by Malcolm Olivarez MD  on 1/25/2022 at 2:48 PM

## 2022-02-01 ENCOUNTER — NURSE ONLY (OUTPATIENT)
Dept: LAB | Age: 87
End: 2022-02-01

## 2022-02-01 DIAGNOSIS — N18.30 STAGE 3 CHRONIC KIDNEY DISEASE, UNSPECIFIED WHETHER STAGE 3A OR 3B CKD (HCC): ICD-10-CM

## 2022-02-01 LAB
ANION GAP SERPL CALCULATED.3IONS-SCNC: 10 MEQ/L (ref 8–16)
BUN BLDV-MCNC: 22 MG/DL (ref 7–22)
CALCIUM SERPL-MCNC: 8.8 MG/DL (ref 8.5–10.5)
CHLORIDE BLD-SCNC: 104 MEQ/L (ref 98–111)
CO2: 26 MEQ/L (ref 23–33)
CREAT SERPL-MCNC: 1.7 MG/DL (ref 0.4–1.2)
ERYTHROCYTE [DISTWIDTH] IN BLOOD BY AUTOMATED COUNT: 14.6 % (ref 11.5–14.5)
ERYTHROCYTE [DISTWIDTH] IN BLOOD BY AUTOMATED COUNT: 47.9 FL (ref 35–45)
GFR SERPL CREATININE-BSD FRML MDRD: 39 ML/MIN/1.73M2
GLUCOSE BLD-MCNC: 94 MG/DL (ref 70–108)
HCT VFR BLD CALC: 40.1 % (ref 42–52)
HEMOGLOBIN: 12.5 GM/DL (ref 14–18)
MCH RBC QN AUTO: 28 PG (ref 26–33)
MCHC RBC AUTO-ENTMCNC: 31.2 GM/DL (ref 32.2–35.5)
MCV RBC AUTO: 89.9 FL (ref 80–94)
PHOSPHORUS: 3 MG/DL (ref 2.4–4.7)
PLATELET # BLD: 154 THOU/MM3 (ref 130–400)
PMV BLD AUTO: 12.7 FL (ref 9.4–12.4)
POTASSIUM SERPL-SCNC: 4.2 MEQ/L (ref 3.5–5.2)
PTH INTACT: 108.3 PG/ML (ref 15–65)
RBC # BLD: 4.46 MILL/MM3 (ref 4.7–6.1)
SODIUM BLD-SCNC: 140 MEQ/L (ref 135–145)
VITAMIN D 25-HYDROXY: 57 NG/ML (ref 30–100)
WBC # BLD: 4.7 THOU/MM3 (ref 4.8–10.8)

## 2022-02-07 ENCOUNTER — OFFICE VISIT (OUTPATIENT)
Dept: NEPHROLOGY | Age: 87
End: 2022-02-07
Payer: MEDICARE

## 2022-02-07 VITALS
DIASTOLIC BLOOD PRESSURE: 61 MMHG | HEART RATE: 72 BPM | BODY MASS INDEX: 20.37 KG/M2 | OXYGEN SATURATION: 97 % | SYSTOLIC BLOOD PRESSURE: 128 MMHG | TEMPERATURE: 97.3 F | WEIGHT: 134 LBS

## 2022-02-07 DIAGNOSIS — N18.30 STAGE 3 CHRONIC KIDNEY DISEASE, UNSPECIFIED WHETHER STAGE 3A OR 3B CKD (HCC): ICD-10-CM

## 2022-02-07 DIAGNOSIS — N17.9 AKI (ACUTE KIDNEY INJURY) (HCC): ICD-10-CM

## 2022-02-07 PROCEDURE — 1036F TOBACCO NON-USER: CPT | Performed by: INTERNAL MEDICINE

## 2022-02-07 PROCEDURE — 4040F PNEUMOC VAC/ADMIN/RCVD: CPT | Performed by: INTERNAL MEDICINE

## 2022-02-07 PROCEDURE — G8428 CUR MEDS NOT DOCUMENT: HCPCS | Performed by: INTERNAL MEDICINE

## 2022-02-07 PROCEDURE — G8484 FLU IMMUNIZE NO ADMIN: HCPCS | Performed by: INTERNAL MEDICINE

## 2022-02-07 PROCEDURE — G8420 CALC BMI NORM PARAMETERS: HCPCS | Performed by: INTERNAL MEDICINE

## 2022-02-07 PROCEDURE — 1123F ACP DISCUSS/DSCN MKR DOCD: CPT | Performed by: INTERNAL MEDICINE

## 2022-02-07 PROCEDURE — 99213 OFFICE O/P EST LOW 20 MIN: CPT | Performed by: INTERNAL MEDICINE

## 2022-02-07 RX ORDER — SODIUM BICARBONATE 650 MG/1
650 TABLET ORAL DAILY
Qty: 90 TABLET | Refills: 1 | Status: SHIPPED | OUTPATIENT
Start: 2022-02-07 | End: 2022-02-07 | Stop reason: SDUPTHER

## 2022-02-07 RX ORDER — SODIUM BICARBONATE 650 MG/1
650 TABLET ORAL DAILY
Qty: 90 TABLET | Refills: 3 | Status: SHIPPED | OUTPATIENT
Start: 2022-02-07 | End: 2022-03-29 | Stop reason: SDUPTHER

## 2022-02-07 NOTE — PROGRESS NOTES
[address] [facility]  [address]  [facility] [address]  Dept: [phone]  Loc: [phone]  Progress Note  2/7/2022 2:22 PM      Pt Name:    Juju Eason:    1935  Primary Care Physician:  Steff Millard MD     Chief Complaint:   Chief Complaint   Patient presents with    Follow-up     CKD III        History of Present Illness: This is a follow-up visit for CKD III. Baseline creatinine  1.7-1.8 mg/dL. Hx of HTN, ALONDRA s/p B/L endarterectomy, prior CVA, MDS, hyperhomocysteinemia, hx gastric ulcer. Patient accompanied by his wife. He states he is doing well but wife states he has had a few \"spells\" of increased lethargy, slept 22 hours a day, had some right leg weakness and slurred speech. This happened twice. Did not see the doctor. Had carotid US which looked ok. Did not check Bps during those episodes. Pertinent items are noted in HPI. Past History:  Past Medical History:   Diagnosis Date    Carotid stenosis, left     Cataract     Cataracts, bilateral     Esophageal abnormality     GERD (gastroesophageal reflux disease)     H/O blood clots     Hearing loss     Hypertension 4/13/2014    Macular degeneration     Pneumonia     Skin cancer     removal    [facility]     TIA (transient ischemic attack)     Ulcer     Unspecified cerebral artery occlusion with cerebral infarction NOV. 2013    Unspecified sleep apnea     Wears dentures     FULL UPPER     Past Surgical History:   Procedure Laterality Date    CAROTID ENDARTERECTOMY  01/13/2014    [facility]    CAROTID ENDARTERECTOMY N/A 7/15/2020    RIGHT CAROTID ENDARTERECTOMY performed by Rebecca Hancock MD at [facility] POWER PICC TRIPLE  11/1/2013         SKIN CANCER EXCISION  2010    Basal Cell Carcinoma- Dr. Raeford Apley TUNNELED [address] [facility] VITALS:  /61 (Site: Right Upper Arm, Position: Sitting, Cuff Size: Large Adult)   Pulse 72   Temp 97.3 °F (36.3 °C) (Temporal)   Wt 134 lb (60.8 kg)   SpO2 97%   BMI 20.37 kg/m²   Wt Readings from Last 3 Encounters:   02/07/22 134 lb (60.8 kg)   01/25/22 131 lb 9.6 oz (59.7 kg)   08/11/21 135 lb (61.2 kg)     Body mass index is 20.37 kg/m². General Appearance: alert and cooperative with exam, frail  HEENT: EOMI, moist oral mucus membranes  Neck: No jugular venous distention,  Lungs: Air entry B/L, no crackles or rales, no use of accessory muscles  Heart: S1, S2 heard  GI: soft, non-tender, no guarding,   Extremities: No sig LE edema, no cyanosis  Skin: warm, dry  Neurologic: no tremor, no asterixis, no focal neurologic deficits     Medications:  Current Outpatient Medications   Medication Sig Dispense Refill    potassium chloride (KLOR-CON) 20 MEQ packet Take 10 mEq by mouth daily       ferrous sulfate (FE TABS 325) 325 (65 Fe) MG EC tablet Take 325 mg by mouth 3 times daily (with meals)      atorvastatin (LIPITOR) 40 MG tablet Take 1 tablet by mouth daily 90 tablet 1    sodium bicarbonate 650 MG tablet Take 1 tablet by mouth 2 times daily 60 tablet 11    folic acid (FOLVITE) 1 MG tablet Take 1 tablet by mouth daily 90 tablet 3    clopidogrel (PLAVIX) 75 MG tablet TAKE 1 TABLET EVERY DAY 90 tablet 3    Cholecalciferol (VITAMIN D3) 125 MCG (5000 UT) TABS Take by mouth       No current facility-administered medications for this visit.         Laboratory & Diagnostics:  CBC:   Lab Results   Component Value Date    WBC 4.7 (L) 02/01/2022    HGB 12.5 (L) 02/01/2022    HCT 40.1 (L) 02/01/2022    MCV 89.9 02/01/2022     02/01/2022     BMP:    Lab Results   Component Value Date     02/01/2022     08/09/2021     07/14/2021    K 4.2 02/01/2022    K 4.1 08/09/2021    K 4.5 07/14/2021     02/01/2022     08/09/2021     07/14/2021    CO2 26 02/01/2022    CO2 28 08/09/2021    CO2 22 (L) 07/14/2021    BUN 22 02/01/2022    BUN 19 08/09/2021    BUN 22 07/14/2021    CREATININE 1.7 (H) 02/01/2022    CREATININE 1.8 (H) 08/09/2021    CREATININE 1.9 (H) 07/14/2021    GLUCOSE 94 02/01/2022    GLUCOSE 99 08/09/2021    GLUCOSE 102 07/14/2021      Hepatic:   Lab Results   Component Value Date    AST 15 12/02/2020    AST 20 08/03/2020    AST 23 12/05/2016    ALT 21 12/02/2020    ALT 28 08/03/2020    ALT 20 12/05/2016    BILITOT 0.4 12/02/2020    BILITOT 0.4 08/03/2020    BILITOT 0.3 12/05/2016    ALKPHOS 108 12/02/2020    ALKPHOS 136 (H) 08/03/2020    ALKPHOS 81 12/05/2016     BNP:   Lab Results   Component Value Date    .2 (H) 01/15/2014     Lipids:   Lab Results   Component Value Date    CHOL 139 08/03/2020    HDL 43 08/03/2020     INR:   Lab Results   Component Value Date    INR 0.98 07/15/2020    INR 0.92 12/05/2016    INR 0.97 03/03/2016     URINE:   Lab Results   Component Value Date    PROTUR < 4.0 07/14/2021     Lab Results   Component Value Date    NITRU NEGATIVE 07/14/2021    COLORU YELLOW 07/14/2021    PHUR 5.5 07/14/2021    LABCAST NONE SEEN 07/14/2021    LABCAST NONE SEEN 07/14/2021    WBCUA 0-2 07/14/2021    RBCUA 0-2 07/14/2021    YEAST NONE SEEN 07/14/2021    BACTERIA NONE SEEN 07/14/2021    SPECGRAV 1.015 07/14/2021    LEUKOCYTESUR NEGATIVE 07/14/2021    UROBILINOGEN 0.2 07/14/2021    BILIRUBINUR NEGATIVE 07/14/2021    BLOODU NEGATIVE 07/14/2021    GLUCOSEU NEGATIVE 12/06/2016    KETUA NEGATIVE 07/14/2021      Microalbumen/Creatinine ratio:  No components found for: RUCREAT        Impression/Plan:   1. 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 Improved. ACE-I was stopped due to VIRGINIE  2. Metabolic acidosis:better, reduce bicarb to once daily  3. Vit D deficiency: improved  4. HTN: s/p hypotension. BPs stable now  5. Hx carotid artery stenosis s/p CEA B/L  6. Hx CVA : having concerning symptoms lately. Recommended head CT but patient is refusing.   Urged patient to make appointment with PCP    Bloodwork and medications were reviewed and plan of care discussed with the patient. Return to clinic in 6 months  or sooner if the need arises.       Ashlyn Ozuna DO  [facility]

## 2022-03-10 RX ORDER — ATORVASTATIN CALCIUM 40 MG/1
40 TABLET, FILM COATED ORAL DAILY
Qty: 90 TABLET | Refills: 0 | Status: SHIPPED | OUTPATIENT
Start: 2022-03-10 | End: 2022-03-29 | Stop reason: SDUPTHER

## 2022-03-10 NOTE — TELEPHONE ENCOUNTER
Please advise pt canceled and has not rescheduled an appt. [id number] [address] for 90 day supply? This medication refill is regarding a electronic request.  Refill requested by Terry 5 Requested Prescriptions     Pending Prescriptions Disp Refills    atorvastatin (LIPITOR) 40 MG tablet [Pharmacy Med Name: ATORVASTATIN 40MG TABLETS] 90 tablet 0     Sig: TAKE 1 TABLET BY MOUTH DAILY     Date of last visit: 5/24/2021   Date of next visit: None  Date of last refill: 9/13/21 #90/1    Last Lipid Panel:    Lab Results   Component Value Date    CHOL 139 08/03/2020    TRIG 84 08/03/2020    HDL 43 08/03/2020    LDLCALC 79 08/03/2020     Last CMP:   Lab Results   Component Value Date     02/01/2022    K 4.2 02/01/2022     02/01/2022    CO2 26 02/01/2022    BUN 22 02/01/2022    CREATININE 1.7 (H) 02/01/2022    GLUCOSE 94 02/01/2022    CALCIUM 8.8 02/01/2022    PROT 6.8 12/02/2020    LABALBU 4.2 12/02/2020    BILITOT 0.4 12/02/2020    ALKPHOS 108 12/02/2020    AST 15 12/02/2020    ALT 21 12/02/2020    LABGLOM 39 (A) 02/01/2022    GFRAA >60 01/14/2014    GLOB NOT REPORTED 11/01/2013     Rx verified, ordered and set to EP.

## 2022-03-11 NOTE — TELEPHONE ENCOUNTER
LM for pt letting him know the prescription was sent in for #90/NR. I also let him know that ES wants him to have appt with WS or TS in next 90 days. I asked he call the office back to schedule the appt. If pt calls back he needs an appt in the next 90 days with WS or TS.

## 2022-03-15 NOTE — TELEPHONE ENCOUNTER
Luis for pt letting him know the prescription was sent into the pharmacy for 90 days with no refill. I also let him know that we will talk about his other prescriptions of any other questions he could have at his appt coming up with WS on 3/29/22.

## 2022-03-29 ENCOUNTER — OFFICE VISIT (OUTPATIENT)
Dept: FAMILY MEDICINE CLINIC | Age: 87
End: 2022-03-29
Payer: MEDICARE

## 2022-03-29 VITALS
SYSTOLIC BLOOD PRESSURE: 136 MMHG | WEIGHT: 140 LBS | BODY MASS INDEX: 21.22 KG/M2 | RESPIRATION RATE: 14 BRPM | DIASTOLIC BLOOD PRESSURE: 84 MMHG | HEART RATE: 60 BPM | HEIGHT: 68 IN

## 2022-03-29 DIAGNOSIS — E78.5 HYPERLIPIDEMIA, UNSPECIFIED HYPERLIPIDEMIA TYPE: ICD-10-CM

## 2022-03-29 DIAGNOSIS — Z00.00 MEDICARE ANNUAL WELLNESS VISIT, SUBSEQUENT: Primary | ICD-10-CM

## 2022-03-29 DIAGNOSIS — N18.30 STAGE 3 CHRONIC KIDNEY DISEASE, UNSPECIFIED WHETHER STAGE 3A OR 3B CKD (HCC): ICD-10-CM

## 2022-03-29 DIAGNOSIS — I73.9 PVD (PERIPHERAL VASCULAR DISEASE) (HCC): ICD-10-CM

## 2022-03-29 DIAGNOSIS — D46.9 MYELODYSPLASTIC SYNDROME (HCC): ICD-10-CM

## 2022-03-29 DIAGNOSIS — E72.11 HYPERHOMOCYSTEINEMIA (HCC): ICD-10-CM

## 2022-03-29 DIAGNOSIS — N17.9 AKI (ACUTE KIDNEY INJURY) (HCC): ICD-10-CM

## 2022-03-29 DIAGNOSIS — G81.91 HEMIPARESIS, RIGHT (HCC): ICD-10-CM

## 2022-03-29 DIAGNOSIS — I63.9 CEREBRAL INFARCTION, UNSPECIFIED MECHANISM (HCC): ICD-10-CM

## 2022-03-29 PROCEDURE — 4040F PNEUMOC VAC/ADMIN/RCVD: CPT | Performed by: NURSE PRACTITIONER

## 2022-03-29 PROCEDURE — 1123F ACP DISCUSS/DSCN MKR DOCD: CPT | Performed by: NURSE PRACTITIONER

## 2022-03-29 PROCEDURE — G0439 PPPS, SUBSEQ VISIT: HCPCS | Performed by: NURSE PRACTITIONER

## 2022-03-29 PROCEDURE — G8484 FLU IMMUNIZE NO ADMIN: HCPCS | Performed by: NURSE PRACTITIONER

## 2022-03-29 RX ORDER — ATORVASTATIN CALCIUM 40 MG/1
40 TABLET, FILM COATED ORAL DAILY
Qty: 90 TABLET | Refills: 0 | Status: SHIPPED | OUTPATIENT
Start: 2022-03-29 | End: 2022-07-04 | Stop reason: SDUPTHER

## 2022-03-29 RX ORDER — POTASSIUM CHLORIDE 750 MG/1
TABLET, FILM COATED, EXTENDED RELEASE ORAL
COMMUNITY
Start: 2022-01-06 | End: 2022-04-07 | Stop reason: ALTCHOICE

## 2022-03-29 RX ORDER — FOLIC ACID 1 MG/1
1 TABLET ORAL DAILY
Qty: 90 TABLET | Refills: 3 | Status: CANCELLED | OUTPATIENT
Start: 2022-03-29

## 2022-03-29 RX ORDER — SODIUM BICARBONATE 650 MG/1
650 TABLET ORAL DAILY
Qty: 90 TABLET | Refills: 3 | Status: SHIPPED | OUTPATIENT
Start: 2022-03-29 | End: 2022-06-27

## 2022-03-29 RX ORDER — CLOPIDOGREL BISULFATE 75 MG/1
TABLET ORAL
Qty: 90 TABLET | Refills: 3 | Status: SHIPPED | OUTPATIENT
Start: 2022-03-29

## 2022-03-29 SDOH — ECONOMIC STABILITY: FOOD INSECURITY: WITHIN THE PAST 12 MONTHS, THE FOOD YOU BOUGHT JUST DIDN'T LAST AND YOU DIDN'T HAVE MONEY TO GET MORE.: NEVER TRUE

## 2022-03-29 SDOH — ECONOMIC STABILITY: FOOD INSECURITY: WITHIN THE PAST 12 MONTHS, YOU WORRIED THAT YOUR FOOD WOULD RUN OUT BEFORE YOU GOT MONEY TO BUY MORE.: NEVER TRUE

## 2022-03-29 ASSESSMENT — PATIENT HEALTH QUESTIONNAIRE - PHQ9
SUM OF ALL RESPONSES TO PHQ QUESTIONS 1-9: 0
SUM OF ALL RESPONSES TO PHQ9 QUESTIONS 1 & 2: 0
2. FEELING DOWN, DEPRESSED OR HOPELESS: 0
SUM OF ALL RESPONSES TO PHQ QUESTIONS 1-9: 0
1. LITTLE INTEREST OR PLEASURE IN DOING THINGS: 0

## 2022-03-29 ASSESSMENT — SOCIAL DETERMINANTS OF HEALTH (SDOH): HOW HARD IS IT FOR YOU TO PAY FOR THE VERY BASICS LIKE FOOD, HOUSING, MEDICAL CARE, AND HEATING?: NOT HARD AT ALL

## 2022-03-29 ASSESSMENT — LIFESTYLE VARIABLES: HOW OFTEN DO YOU HAVE A DRINK CONTAINING ALCOHOL: NEVER

## 2022-03-29 NOTE — PROGRESS NOTES
[place] [address] [place] [id number]  Dept: [phone]  Dept Fax: [phone]: [phone]    Medicare Annual Wellness Visit    Horace Edmonds is here for Medicare AWV (No concerns )    Assessment & Plan   Medicare annual wellness visit, subsequent  Cerebral infarction, unspecified mechanism ([facility]  -     clopidogrel (PLAVIX) 75 MG tablet; TAKE 1 TABLET EVERY DAY, Disp-90 tablet, R-3Normal  Hyperhomocysteinemia (HCC)  VIRGINIE (acute kidney injury) ([facility]  -     sodium bicarbonate 650 MG tablet; Take 1 tablet by mouth daily, Disp-90 tablet, R-3Normal  Stage 3 chronic kidney disease, unspecified whether stage 3a or 3b CKD (HCC)  -     sodium bicarbonate 650 MG tablet; Take 1 tablet by mouth daily, Disp-90 tablet, R-3Normal  Hyperlipidemia, unspecified hyperlipidemia type  -     Lipid Panel; Future  Myelodysplastic syndrome (HCC)  Hemiparesis, right ([facility]  PVD (peripheral vascular disease) ([facility]           - Pt to continue with Dr. Wendy Bolaños for Hematology at [facility] every 6 weeks for port flush and blood work. Takes potassium and iron from that office.   - Follow up with Dr. Gayle Lao for CKD management in 3 months  - Dr Marylou Garcia check up for carotid artery managment. - Sees Dr Jamaica Mcghee next week for heart.         Recommendations for Preventive Services Due: see orders and patient instructions/AVS.  Recommended screening schedule for the next 5-10 years is provided to the patient in written form: see Patient Instructions/AVS.     Return for Medicare Annual Wellness Visit in 1 year. Subjective     Pt presents for follow up of HTN, Hyperlipidemia, CVA, GERD, Carotid Stenosis (s/p Staged Bilateral Endarterectomies- 2014 (left), 7/15/2020 (right)), Hyperhomocysteinemia, Myelodysplastic Syndrome, and VIT D Deficiency      Patient's complete Health Risk Assessment and screening values have been reviewed and are found in Flowsheets.  The following problems were reviewed today and where indicated follow up appointments were made and/or referrals ordered. Positive Risk Factor Screenings with Interventions:    Fall Risk:  Do you feel unsteady or are you worried about falling? : (!) yes  2 or more falls in past year?: no  Fall with injury in past year?: no   - Home safety tips provided    Cognitive: Words recalled: 0 Words Recalled  Clock Drawing Test (CDT): (!) Abnormal (could not understand directions)  Total Score Interpretation: Abnormal Mini-Cog  - Pt is following up with neurology. No intervention needed. General Health and ACP:  General  In general, how would you say your health is?: (!) Poor  In the past 7 days, have you experienced any of the following: New or Increased Pain, New or Increased Fatigue, Loneliness, Social Isolation, Stress or Anger?: No  Do you get the social and emotional support that you need?: Yes  Do you have a Living Will?: Yes    Advance Directives     Power of  Living Will ACP-Advance Directive ACP-Power of     Not on File Filed on 08/28/17 Filed Not on File        - Following up with providers as planned.      Health Habits/Nutrition:     Physical Activity: Inactive    Days of Exercise per Week: 0 days    Minutes of Exercise per Session: 0 min     Have you lost any weight without trying in the past 3 months?: No  Body mass index: 21.28  Have you seen the dentist within the past year?: (!) No    Hearing/Vision:  Do you or your family notice any trouble with your hearing that hasn't been managed with hearing aids?: (!) Yes  Do you have difficulty driving, watching TV, or doing any of your daily activities because of your eyesight?: No  Have you had an eye exam within the past year?: (!) No  No exam data present  - Pt declined intervention at this time       ADLs:  In the past 7 days, did you need help from others to perform any of the following everyday activities: Eating, dressing, grooming, bathing, toileting, or walking/balance?: No  In the past 7 days, did you need help from others to take care of any of the following: Laundry, housekeeping, banking/finances, shopping, telephone use, food preparation, transportation, or taking medications?: (!) Yes  Select all that apply: (!) Laundry,Housekeeping,Banking/Finances,Shopping,Telephone Use,Food Preparation,Transportation,Taking Medications  - Declines any further assistance            Objective   Vitals:    03/29/22 1109   BP: 136/84   Pulse: 60   Resp: 14   Weight: 140 lb (63.5 kg)   Height: 5' 8\" (1.727 m)      Body mass index is 21.29 kg/m². General Appearance: in no acute distress  Skin: warm and dry, no rash or erythema  Head: normocephalic and atraumatic  Eyes: conjunctivae normal  ENT: tympanic membrane, external ear and ear canal normal bilaterally, oropharynx clear and moist with normal mucous membranes  Pulmonary/Chest: clear to auscultation bilaterally- no wheezes, rales or rhonchi, normal air movement, no respiratory distress  Cardiovascular: normal rate, normal S1 and S2, no gallops, intact distal pulses and no carotid bruits  Abdomen: soft, non-tender, non-distended, normal bowel sounds, no masses or organomegaly  Musculoskeletal: normal range of motion, no joint swelling, deformity or tenderness  Neurologic: gait and coordination normal and speech normal       No Known Allergies  Prior to Visit Medications    Medication Sig Taking?  Authorizing Provider   potassium chloride (KLOR-CON) 10 MEQ extended release tablet  Yes Historical Provider, MD   clopidogrel (PLAVIX) 75 MG tablet TAKE 1 TABLET EVERY DAY Yes JOHNSON Urban CNP   atorvastatin (LIPITOR) 40 MG tablet Take 1 tablet by mouth daily Yes JOHNSON Urban CNP   sodium bicarbonate 650 MG tablet Take 1 tablet by mouth daily Yes JOHNSON Urban CNP   ferrous sulfate (FE TABS 325) 325 (65 Fe) MG EC tablet Take 325 mg by mouth 3 times daily (with meals) Yes Historical Provider, MD folic acid (FOLVITE) 1 MG tablet Take 1 tablet by mouth daily Yes JOHNSON Maddox - CNP   Cholecalciferol (VITAMIN D3) 125 MCG (5000 UT) TABS Take by mouth Yes Historical Provider, MD [place] (Including outside providers/suppliers regularly involved in providing care):   Patient Care Team:  Eben Remy MD as PCP - General (Family Medicine)  Eben Remy MD as PCP - [facility] Empaneled Provider  Chelsea Ernst MD as Cardiologist (Cardiology)    Reviewed and updated this visit:  Tobacco  Allergies  Meds  Problems  Med Hx  Surg Hx  Soc Hx  Fam Hx        -Electronically signed by [facility], APRN - CNP on 3/29/2022 at 1:01 PM

## 2022-03-29 NOTE — PATIENT INSTRUCTIONS
Personalized Preventive Plan for Ted Garcia [facility] PLANO - 3/29/2022  Medicare offers a range of preventive health benefits. Some of the tests and screenings are paid in full while other may be subject to a deductible, co-insurance, and/or copay. Some of these benefits include a comprehensive review of your medical history including lifestyle, illnesses that may run in your family, and various assessments and screenings as appropriate. After reviewing your medical record and screening and assessments performed today your provider may have ordered immunizations, labs, imaging, and/or referrals for you. A list of these orders (if applicable) as well as your Preventive Care list are included within your After Visit Summary for your review. Other Preventive Recommendations:    · A preventive eye exam performed by an eye specialist is recommended every 1-2 years to screen for glaucoma; cataracts, macular degeneration, and other eye disorders. · A preventive dental visit is recommended every 6 months. · Try to get at least 150 minutes of exercise per week or 10,000 steps per day on a pedometer . · Order or download the FREE \"Exercise & Physical Activity: Your Everyday Guide\" from [employer]. Call 1-962.612.9507 or search [employer] online. · You need 4095-0479 mg of calcium and 2383-9436 IU of vitamin D per day. It is possible to meet your calcium requirement with diet alone, but a vitamin D supplement is usually necessary to meet this goal.  · When exposed to the sun, use a sunscreen that protects against both UVA and UVB radiation with an SPF of 30 or greater. Reapply every 2 to 3 hours or after sweating, drying off with a towel, or swimming. · Always wear a seat belt when traveling in a car. Always wear a helmet when riding a bicycle or motorcycle.

## 2022-03-30 DIAGNOSIS — E72.11 HYPERHOMOCYSTEINEMIA (HCC): ICD-10-CM

## 2022-03-30 RX ORDER — FOLIC ACID 1 MG/1
1 TABLET ORAL DAILY
Qty: 90 TABLET | Refills: 3 | Status: SHIPPED | OUTPATIENT
Start: 2022-03-30

## 2022-03-30 NOTE — TELEPHONE ENCOUNTER
This medication refill is regarding a MyChart request.  Refill requested by patient. Requested Prescriptions     Pending Prescriptions Disp Refills    folic acid (FOLVITE) 1 MG tablet 90 tablet 3     Sig: Take 1 tablet by mouth daily     Date of last visit: 3/29/2022   Date of next visit: None  Date of last refill: 4/16/21 #90/3  Pharmacy Name: [facility]    Rx verified, ordered and set to EP.

## 2022-04-07 ENCOUNTER — OFFICE VISIT (OUTPATIENT)
Dept: CARDIOLOGY CLINIC | Age: 87
End: 2022-04-07
Payer: MEDICARE

## 2022-04-07 VITALS
HEART RATE: 68 BPM | BODY MASS INDEX: 19.99 KG/M2 | HEIGHT: 69 IN | DIASTOLIC BLOOD PRESSURE: 70 MMHG | WEIGHT: 135 LBS | SYSTOLIC BLOOD PRESSURE: 142 MMHG

## 2022-04-07 DIAGNOSIS — I10 PRIMARY HYPERTENSION: ICD-10-CM

## 2022-04-07 DIAGNOSIS — I65.21 CAROTID STENOSIS, RIGHT: ICD-10-CM

## 2022-04-07 DIAGNOSIS — E78.01 FAMILIAL HYPERCHOLESTEROLEMIA: ICD-10-CM

## 2022-04-07 DIAGNOSIS — Z86.73 HISTORY OF CVA (CEREBROVASCULAR ACCIDENT): Primary | ICD-10-CM

## 2022-04-07 PROCEDURE — 4040F PNEUMOC VAC/ADMIN/RCVD: CPT | Performed by: NUCLEAR MEDICINE

## 2022-04-07 PROCEDURE — G8420 CALC BMI NORM PARAMETERS: HCPCS | Performed by: NUCLEAR MEDICINE

## 2022-04-07 PROCEDURE — 1123F ACP DISCUSS/DSCN MKR DOCD: CPT | Performed by: NUCLEAR MEDICINE

## 2022-04-07 PROCEDURE — 93000 ELECTROCARDIOGRAM COMPLETE: CPT | Performed by: NUCLEAR MEDICINE

## 2022-04-07 PROCEDURE — G8427 DOCREV CUR MEDS BY ELIG CLIN: HCPCS | Performed by: NUCLEAR MEDICINE

## 2022-04-07 PROCEDURE — 1036F TOBACCO NON-USER: CPT | Performed by: NUCLEAR MEDICINE

## 2022-04-07 PROCEDURE — 99214 OFFICE O/P EST MOD 30 MIN: CPT | Performed by: NUCLEAR MEDICINE

## 2022-04-07 NOTE — PROGRESS NOTES
[facility] [place] [address] [address]  [facility] [address]  Dept: [phone]  Dept Fax: [phone]  Loc: [phone]    Visit Date: 4/7/2022    Christine Burgess is a 82 y.o. male who presents todayfor:  Chief Complaint   Patient presents with    Check-Up     EKG done today    Dizziness    Hypertension    Hyperlipidemia     Not seen since 2020   Known CEA and strokes  As well risk for CAD  Lately some TIA type symptoms  Some confusion and speech issues on two occasions  Concerning for a stroke   No chest pain   Some dyspnea  Known HTN and hyperlipidemia  On medical RX  BP is stable   On statins for hyperlipidemia    HPI:  HPI  Past Medical History:   Diagnosis Date    Carotid stenosis, left     Cataract     Cataracts, bilateral     Esophageal abnormality     GERD (gastroesophageal reflux disease)     H/O blood clots     Hearing loss     Hypertension 4/13/2014    Macular degeneration     Pneumonia     Skin cancer     removal    Stroke ([address]     TIA (transient ischemic attack)     Ulcer     Unspecified cerebral artery occlusion with cerebral infarction NOV. 2013    Unspecified sleep apnea     Wears dentures     FULL UPPER      Past Surgical History:   Procedure Laterality Date    [facility]/13/[facility]    [facility]    CAROTID ENDARTERECTOMY N/A 7/15/2020    RIGHT CAROTID ENDARTERECTOMY performed by Ana Barney MD at [facility] POWER PICC TRIPLE  11/1/2013         SKIN CANCER EXCISION  2010    Basal Cell Carcinoma- Dr. Benita Watts TUNNELED CENTRAL VENOUS CATHETER W/ SUBCUTANEOUS PORT       Family History   Problem Relation Age of Onset    No Known Problems Mother     No Known Problems Father     No Known Problems Sister     Breast Cancer Sister      Social History     Tobacco Use    Smoking status: Former Smoker     Packs/day: 2.00     Years: 40.00     Pack years: 80.00     Types: Cigarettes     Start date: 1951     Quit date: 1/10/2000     Years since quittin.2    Smokeless tobacco: Never Used   Substance Use Topics    Alcohol use: No     Alcohol/week: 0.0 standard drinks      Current Outpatient Medications   Medication Sig Dispense Refill    folic acid (FOLVITE) 1 MG tablet Take 1 tablet by mouth daily 90 tablet 3    clopidogrel (PLAVIX) 75 MG tablet TAKE 1 TABLET EVERY DAY 90 tablet 3    atorvastatin (LIPITOR) 40 MG tablet Take 1 tablet by mouth daily 90 tablet 0    sodium bicarbonate 650 MG tablet Take 1 tablet by mouth daily 90 tablet 3    Cholecalciferol (VITAMIN D3) 125 MCG (5000 UT) TABS Take by mouth       No current facility-administered medications for this visit. No Known Allergies  Health Maintenance   Topic Date Due    Shingles Vaccine (1 of 2) Never done    Lipid screen  2021    Flu vaccine (Season Ended) 2022    Depression Screen  2023    Annual Wellness Visit (AWV)  2023    DTaP/Tdap/Td vaccine (2 - Td or Tdap) 10/18/2028    Pneumococcal 65+ years Vaccine  Completed    COVID-19 Vaccine  Completed    Hepatitis A vaccine  Aged Out    Hepatitis B vaccine  Aged Out    Hib vaccine  Aged Out    Meningococcal (ACWY) vaccine  Aged Out       Subjective:  Review of Systems  General:   No fever, no chills, some fatigue or weight loss  Pulmonary:    some dyspnea, no wheezing  Cardiac:    Denies recent chest pain,   GI:     No nausea or vomiting, no abdominal pain  Neuro:     No dizziness or light headedness,   Musculoskeletal:  No recent active issues  Extremities:   No edema, no obvious claudication       Objective:  Physical Exam  BP (!) 142/70   Pulse 68   Ht 5' 9\" (1.753 m)   Wt 135 lb (61.2 kg)   BMI 19.94 kg/m²   General:   Well developed, well nourished  Lungs:    Clear to auscultation  Heart:    Normal S1 S2, Slight murmur.  no rubs, no gallops  Abdomen:   Soft, non tender, no organomegalies, positive bowel sounds  Extremities:   No edema, no cyanosis, good peripheral pulses  Neurological:   Awake, alert, oriented. No obvious focal deficits  Musculoskelatal:  No obvious deformities    Assessment:      Diagnosis Orders   1. History of CVA (cerebrovascular accident)  EKG 12 Lead   2. Carotid stenosis, right  EKG 12 Lead   3. Primary hypertension     4. Familial hypercholesterolemia     likely TIA than anything else  Higher risk for stroke   Known CEA   Recent US was okay   ECG in office was done today. I reviewed the ECG. No acute findings      Plan:  No follow-ups on file. Discussed  Possible TIA   Consider neurology referral   He prefers conservative therapy   He feels he is getting too old  Continue risk factor modification and medical management  Thank you for allowing me to participate in the care of your patient. Please don't hesitate to contact me regarding any further issues related to the patient care    Orders Placed:  Orders Placed This Encounter   Procedures    EKG 12 Lead     Order Specific Question:   Reason for Exam?     Answer: Other       Medications Prescribed:  No orders of the defined types were placed in this encounter. Discussed use, benefit, and side effects of prescribed medications. All patient questions answered. Pt voicedunderstanding. Instructed to continue current medications, diet and exercise. Continue risk factor modification and medical management. Patient agreed with treatment plan. Follow up as directed.     Electronically signedby Kita Tracey MD on 4/7/2022 at 3:25 PM

## 2022-07-05 RX ORDER — ATORVASTATIN CALCIUM 40 MG/1
40 TABLET, FILM COATED ORAL DAILY
Qty: 90 TABLET | Refills: 0 | Status: SHIPPED | OUTPATIENT
Start: 2022-07-05

## 2022-07-07 NOTE — TELEPHONE ENCOUNTER
smsPREPt message sent to pt letting him know the script was sent in and that TS would like for him to complete the lab work from 3/29/22 after a 12 hour fast. I also asked him if he would like to stay with WS or TS under our office.

## 2022-08-04 ENCOUNTER — NURSE ONLY (OUTPATIENT)
Dept: LAB | Age: 87
End: 2022-08-04

## 2022-08-04 DIAGNOSIS — N18.30 STAGE 3 CHRONIC KIDNEY DISEASE, UNSPECIFIED WHETHER STAGE 3A OR 3B CKD (HCC): ICD-10-CM

## 2022-08-04 LAB
ANION GAP SERPL CALCULATED.3IONS-SCNC: 14 MEQ/L (ref 8–16)
BUN BLDV-MCNC: 29 MG/DL (ref 7–22)
CALCIUM SERPL-MCNC: 9.4 MG/DL (ref 8.5–10.5)
CHLORIDE BLD-SCNC: 105 MEQ/L (ref 98–111)
CO2: 24 MEQ/L (ref 23–33)
CREAT SERPL-MCNC: 2 MG/DL (ref 0.4–1.2)
GFR SERPL CREATININE-BSD FRML MDRD: 32 ML/MIN/1.73M2
GLUCOSE BLD-MCNC: 95 MG/DL (ref 70–108)
POTASSIUM SERPL-SCNC: 4.4 MEQ/L (ref 3.5–5.2)
PTH INTACT: 47.6 PG/ML (ref 15–65)
SODIUM BLD-SCNC: 143 MEQ/L (ref 135–145)

## 2022-08-11 ENCOUNTER — HOSPITAL ENCOUNTER (OUTPATIENT)
Dept: GENERAL RADIOLOGY | Age: 87
Discharge: HOME | End: 2022-08-11
Payer: MEDICARE

## 2022-08-11 ENCOUNTER — HOSPITAL ENCOUNTER (OUTPATIENT)
Age: 87
Discharge: HOME | End: 2022-08-11
Payer: MEDICARE

## 2022-08-11 ENCOUNTER — OFFICE VISIT (OUTPATIENT)
Dept: NEPHROLOGY | Age: 87
End: 2022-08-11
Payer: MEDICARE

## 2022-08-11 VITALS
SYSTOLIC BLOOD PRESSURE: 134 MMHG | DIASTOLIC BLOOD PRESSURE: 62 MMHG | OXYGEN SATURATION: 98 % | HEART RATE: 68 BPM | BODY MASS INDEX: 20.08 KG/M2 | WEIGHT: 136 LBS

## 2022-08-11 DIAGNOSIS — M79.641 RIGHT HAND PAIN: ICD-10-CM

## 2022-08-11 DIAGNOSIS — M79.641 RIGHT HAND PAIN: Primary | ICD-10-CM

## 2022-08-11 DIAGNOSIS — N18.32 STAGE 3B CHRONIC KIDNEY DISEASE (HCC): ICD-10-CM

## 2022-08-11 PROCEDURE — G8427 DOCREV CUR MEDS BY ELIG CLIN: HCPCS | Performed by: INTERNAL MEDICINE

## 2022-08-11 PROCEDURE — 1036F TOBACCO NON-USER: CPT | Performed by: INTERNAL MEDICINE

## 2022-08-11 PROCEDURE — 73130 X-RAY EXAM OF HAND: CPT

## 2022-08-11 PROCEDURE — 1123F ACP DISCUSS/DSCN MKR DOCD: CPT | Performed by: INTERNAL MEDICINE

## 2022-08-11 PROCEDURE — 99214 OFFICE O/P EST MOD 30 MIN: CPT | Performed by: INTERNAL MEDICINE

## 2022-08-11 PROCEDURE — G8420 CALC BMI NORM PARAMETERS: HCPCS | Performed by: INTERNAL MEDICINE

## 2022-08-11 RX ORDER — LANOLIN ALCOHOL/MO/W.PET/CERES
325 CREAM (GRAM) TOPICAL
COMMUNITY
Start: 2022-01-06

## 2022-08-11 NOTE — PROGRESS NOTES
[address] [facility]  [address]  [facility] [address]  Dept: [phone]  Loc: [phone]  Progress Note  8/11/2022 2:47 PM      Pt Name:    Michael Borrego  YOB: 1935  Primary Care Physician:  Mariusz Rodas MD     Chief Complaint:   Chief Complaint   Patient presents with    Follow-up     CKD III        History of Present Illness: This is a follow-up visit for CKD III. Baseline creatinine  1.7-1.8 mg/dL. Hx of HTN, ALONDRA s/p B/L endarterectomy, prior CVA, MDS, hyperhomocysteinemia, hx gastric ulcer. Patient feeling ok. Does have some swelling and bruising to his right, when asked about it he states he had a fall. He wife was unaware. Patient has a walker but doesn't use it. No edema in legs. Denies any complaints. Doesn't drink much water. Pertinent items are noted in HPI. Past History:  Past Medical History:   Diagnosis Date    Carotid stenosis, left     Cataract     Cataracts, bilateral     Esophageal abnormality     GERD (gastroesophageal reflux disease)     H/O blood clots     Hearing loss     Hypertension 4/13/2014    Macular degeneration     Pneumonia     Skin cancer     removal    Stroke [address] [address]     TIA (transient ischemic attack)     Ulcer     Unspecified cerebral artery occlusion with cerebral infarction NOV. 2013    Unspecified sleep apnea     Wears dentures     FULL UPPER     Past Surgical History:   Procedure Laterality Date    [facility]    CAROTID ENDARTERECTOMY N/A 7/15/2020    RIGHT CAROTID ENDARTERECTOMY performed by Remington Brooke MD at [address] [place] [address] TRIPLE  11/1/2013         SKIN CANCER EXCISION  2010    Basal Cell Carcinoma- Dr. Laverne Delacruz    TUNNELED CENTRAL VENOUS CATHETER W/ SUBCUTANEOUS PORT          VITALS:  /62 (Site: Left Upper Arm, Position: Sitting, Cuff Size: Large Adult)   Pulse 68 Wt 136 lb (61.7 kg)   SpO2 98%   BMI 20.08 kg/m²   Wt Readings from Last 3 Encounters:   08/11/22 136 lb (61.7 kg)   04/07/22 135 lb (61.2 kg)   03/29/22 140 lb (63.5 kg)     Body mass index is 20.08 kg/m². General Appearance: alert and cooperative with exam, frail  HEENT: EOMI, moist oral mucus membranes  Neck: No jugular venous distention,  Lungs: Air entry B/L, no crackles or rales, no use of accessory muscles  Heart: S1, S2 heard  GI: soft, non-tender, no guarding,   Extremities: No sig LE edema,right hand swelling/bruising  Skin: warm, dry  Neurologic: no tremor, no asterixis,     Medications:  Current Outpatient Medications   Medication Sig Dispense Refill    ferrous sulfate (FE TABS 325) 325 (65 Fe) MG EC tablet Take 325 mg by mouth daily (with breakfast)      atorvastatin (LIPITOR) 40 MG tablet Take 1 tablet by mouth daily 90 tablet 0    folic acid (FOLVITE) 1 MG tablet Take 1 tablet by mouth daily 90 tablet 3    clopidogrel (PLAVIX) 75 MG tablet TAKE 1 TABLET EVERY DAY 90 tablet 3    Cholecalciferol (VITAMIN D3) 125 MCG (5000 UT) TABS Take by mouth       No current facility-administered medications for this visit.         Laboratory & Diagnostics:  CBC:   Lab Results   Component Value Date    WBC 5.2 07/13/2022    HGB 12.4 (L) 07/13/2022    HCT 36.9 (L) 07/13/2022    MCV 83.3 07/13/2022    PLT 99 (L) 07/13/2022     BMP:    Lab Results   Component Value Date     08/04/2022     07/13/2022     02/01/2022    K 4.4 08/04/2022    K 4.6 07/13/2022    K 4.2 02/01/2022     08/04/2022     07/13/2022     02/01/2022    CO2 24 08/04/2022    CO2 28 07/13/2022    CO2 26 02/01/2022    BUN 29 (H) 08/04/2022    BUN 26 (H) 07/13/2022    BUN 22 02/01/2022    CREATININE 2.0 (H) 08/04/2022    CREATININE 1.88 (H) 07/13/2022    CREATININE 1.7 (H) 02/01/2022    GLUCOSE 95 08/04/2022    GLUCOSE 93 07/13/2022    GLUCOSE 94 02/01/2022      Hepatic:   Lab Results   Component Value Date    AST

## 2022-08-12 ENCOUNTER — TELEPHONE (OUTPATIENT)
Dept: NEPHROLOGY | Age: 87
End: 2022-08-12

## 2022-08-12 NOTE — TELEPHONE ENCOUNTER
----- Message from Jaclyn Vergara DO sent at 8/12/2022 12:46 PM EDT -----  Chriss Gibson is looking ok, no fractures seen  ----- Message -----  From: Jerome Gary Incoming Radiant Results From [employer]  Sent: 8/11/2022   4:50 PM EDT  To: Jaclyn Vergara DO

## 2022-12-08 DIAGNOSIS — N17.9 AKI (ACUTE KIDNEY INJURY) (HCC): ICD-10-CM

## 2022-12-08 DIAGNOSIS — N18.30 STAGE 3 CHRONIC KIDNEY DISEASE, UNSPECIFIED WHETHER STAGE 3A OR 3B CKD (HCC): ICD-10-CM

## 2022-12-08 RX ORDER — SODIUM BICARBONATE 650 MG/1
650 TABLET ORAL DAILY
Qty: 90 TABLET | Refills: 3 | OUTPATIENT
Start: 2022-12-08

## 2022-12-08 NOTE — TELEPHONE ENCOUNTER
Patient's last appointment was : 3/29/2022  Patient's next appointment is :   Future Appointments   Date Time Provider Lizette Hampton   4/3/2023  2:40 PM DO IRISH Fierro [facility] [employer] [address]     Last refilled:    Lab Results   Component Value Date    LABA1C 5.7 05/19/2021     Lab Results   Component Value Date    CHOL 139 08/03/2020    TRIG 84 08/03/2020    HDL 43 08/03/2020    LDLCALC 79 08/03/2020    LDLDIRECT 73.13 12/02/2020     Lab Results   Component Value Date     08/04/2022    K 4.4 08/04/2022     08/04/2022    CO2 24 08/04/2022    BUN 29 (H) 08/04/2022    CREATININE 2.0 (H) 08/04/2022    GLUCOSE 95 08/04/2022    CALCIUM 9.4 08/04/2022    PROT 6.8 12/02/2020    LABALBU 4.2 12/02/2020    BILITOT 0.4 12/02/2020    ALKPHOS 108 12/02/2020    AST 15 12/02/2020    ALT 21 12/02/2020    LABGLOM 32 (A) 08/04/2022    GFRAA >60 01/14/2014    GLOB NOT REPORTED 11/01/2013     Lab Results   Component Value Date    TSH 1.101 07/13/2022    T4FREE 1.08 12/02/2020     Lab Results   Component Value Date    WBC 5.2 07/13/2022    HGB 12.4 (L) 07/13/2022    HCT 36.9 (L) 07/13/2022    MCV 83.3 07/13/2022    PLT 99 (L) 07/13/2022

## 2022-12-08 NOTE — TELEPHONE ENCOUNTER
Please check with patient/wife to see you patient is following up with-?Our office versus [address]? .  Please let me know as patient will need appointment scheduled in future. Thanks.   ES

## 2022-12-14 RX ORDER — ATORVASTATIN CALCIUM 40 MG/1
TABLET, FILM COATED ORAL
Qty: 90 TABLET | Refills: 0 | Status: SHIPPED | OUTPATIENT
Start: 2022-12-14

## 2022-12-14 NOTE — TELEPHONE ENCOUNTER
This medication refill is regarding a electronic request. Refill requested by  [address] . Requested Prescriptions     Pending Prescriptions Disp Refills    atorvastatin (LIPITOR) 40 MG tablet [Pharmacy Med Name: ATORVASTATIN CALCIUM 40 MG Tablet] 90 tablet 0     Sig: TAKE 1 TABLET EVERY DAY     Date of last visit: 3/29/2022   Date of next visit: None  Date of last refill: 7/5/22 #90/0    Last Lipid Panel:    Lab Results   Component Value Date/Time    CHOL 139 08/03/2020 11:36 AM    TRIG 84 08/03/2020 11:36 AM    HDL 43 08/03/2020 11:36 AM    LDLCALC 79 08/03/2020 11:36 AM     Last CMP:   Lab Results   Component Value Date     08/04/2022    K 4.4 08/04/2022     08/04/2022    CO2 24 08/04/2022    BUN 29 (H) 08/04/2022    CREATININE 2.0 (H) 08/04/2022    GLUCOSE 95 08/04/2022    CALCIUM 9.4 08/04/2022    PROT 6.8 12/02/2020    LABALBU 4.2 12/02/2020    BILITOT 0.4 12/02/2020    ALKPHOS 108 12/02/2020    AST 15 12/02/2020    ALT 21 12/02/2020    LABGLOM 32 (A) 08/04/2022    GFRAA >60 01/14/2014    GLOB NOT REPORTED 11/01/2013     Rx verified, ordered and set to EP.

## 2022-12-27 ENCOUNTER — OFFICE VISIT (OUTPATIENT)
Dept: FAMILY MEDICINE CLINIC | Age: 87
End: 2022-12-27
Payer: MEDICARE

## 2022-12-27 VITALS
WEIGHT: 137.8 LBS | TEMPERATURE: 98 F | HEART RATE: 72 BPM | SYSTOLIC BLOOD PRESSURE: 126 MMHG | DIASTOLIC BLOOD PRESSURE: 60 MMHG | RESPIRATION RATE: 16 BRPM | BODY MASS INDEX: 20.35 KG/M2

## 2022-12-27 DIAGNOSIS — N30.01 ACUTE CYSTITIS WITH HEMATURIA: ICD-10-CM

## 2022-12-27 DIAGNOSIS — R31.9 HEMATURIA, UNSPECIFIED TYPE: Primary | ICD-10-CM

## 2022-12-27 DIAGNOSIS — N18.30 STAGE 3 CHRONIC KIDNEY DISEASE, UNSPECIFIED WHETHER STAGE 3A OR 3B CKD (HCC): ICD-10-CM

## 2022-12-27 LAB
BILIRUBIN URINE: ABNORMAL
BLOOD URINE, POC: ABNORMAL
CHARACTER, URINE: ABNORMAL
COLOR, URINE: ABNORMAL
GLUCOSE URINE: NEGATIVE MG/DL
KETONES, URINE: 15
LEUKOCYTE CLUMPS, URINE: ABNORMAL
NITRITE, URINE: POSITIVE
PH, URINE: 5 (ref 5–9)
PROTEIN, URINE: >= 300 MG/DL
SPECIFIC GRAVITY, URINE: 1.01 (ref 1–1.03)
UROBILINOGEN, URINE: 1 EU/DL (ref 0–1)

## 2022-12-27 PROCEDURE — 1036F TOBACCO NON-USER: CPT | Performed by: NURSE PRACTITIONER

## 2022-12-27 PROCEDURE — 1123F ACP DISCUSS/DSCN MKR DOCD: CPT | Performed by: NURSE PRACTITIONER

## 2022-12-27 PROCEDURE — 81003 URINALYSIS AUTO W/O SCOPE: CPT | Performed by: NURSE PRACTITIONER

## 2022-12-27 PROCEDURE — G8427 DOCREV CUR MEDS BY ELIG CLIN: HCPCS | Performed by: NURSE PRACTITIONER

## 2022-12-27 PROCEDURE — G8420 CALC BMI NORM PARAMETERS: HCPCS | Performed by: NURSE PRACTITIONER

## 2022-12-27 PROCEDURE — 3074F SYST BP LT 130 MM HG: CPT | Performed by: NURSE PRACTITIONER

## 2022-12-27 PROCEDURE — 99213 OFFICE O/P EST LOW 20 MIN: CPT | Performed by: NURSE PRACTITIONER

## 2022-12-27 PROCEDURE — G8484 FLU IMMUNIZE NO ADMIN: HCPCS | Performed by: NURSE PRACTITIONER

## 2022-12-27 PROCEDURE — 3078F DIAST BP <80 MM HG: CPT | Performed by: NURSE PRACTITIONER

## 2022-12-27 RX ORDER — CIPROFLOXACIN 250 MG/1
250 TABLET, FILM COATED ORAL 2 TIMES DAILY
Qty: 20 TABLET | Refills: 0 | Status: SHIPPED | OUTPATIENT
Start: 2022-12-27 | End: 2023-01-06

## 2022-12-27 RX ORDER — POTASSIUM CHLORIDE 750 MG/1
TABLET, FILM COATED, EXTENDED RELEASE ORAL
COMMUNITY
Start: 2022-01-06

## 2022-12-27 ASSESSMENT — ENCOUNTER SYMPTOMS
DIARRHEA: 1
NAUSEA: 0
ABDOMINAL PAIN: 0
VOMITING: 0
FACIAL SWELLING: 0

## 2022-12-27 NOTE — PROGRESS NOTES
[facility]  [address] [place] [address]  Dept: [phone]  Dept Fax: [phone]: [phone]     Visit Date:  12/27/2022      Patient:  Dread Amos  YOB: 1935    HPI:     Chief Complaint   Patient presents with    Hematuria     Blood in urine. Pt presents to the office today with his wife for blood in his urine. Pt denies any flank pain, abdominal cramping, fever or chills. He is having occasional diarrhea. He does wear a depends and has been having a lot of accidents recently. Hematuria  This is a new problem. The current episode started more than 1 month ago. The problem has been gradually worsening since onset. He describes the hematuria as gross hematuria. The hematuria occurs throughout his entire urinary stream. He is experiencing no pain. He describes his urine color as dark red. Irritative symptoms include frequency and urgency. Obstructive symptoms include dribbling. Associated symptoms include hesitancy and urinary retention. Pertinent negatives include no abdominal pain, bladder pain, bone pain, chills, dysuria, facial swelling, fever, flank pain, genital pain, hematospermia, inability to urinate, nausea, vomiting or weight loss. There is no history of BPH, hypertension, kidney stones, prostatitis, recent infection, sickle cell disease or STDs. Risk factors include antiplatelet therapy.      Medications    Current Outpatient Medications:     potassium chloride (KLOR-CON) 10 MEQ extended release tablet, Take by mouth, Disp: , Rfl:     ciprofloxacin (CIPRO) 250 MG tablet, Take 1 tablet by mouth 2 times daily for 10 days, Disp: 20 tablet, Rfl: 0    atorvastatin (LIPITOR) 40 MG tablet, TAKE 1 TABLET EVERY DAY, Disp: 90 tablet, Rfl: 0    folic acid (FOLVITE) 1 MG tablet, Take 1 tablet by mouth daily, Disp: 90 tablet, Rfl: 3    clopidogrel (PLAVIX) 75 MG tablet, TAKE 1 TABLET EVERY DAY, Disp: 90 tablet, Rfl: 3    Cholecalciferol (VITAMIN D3) 125 MCG (5000 UT) TABS, Take by mouth, Disp: , Rfl:     ferrous sulfate (FE TABS 325) 325 (65 Fe) MG EC tablet, Take 325 mg by mouth daily (with breakfast) (Patient not taking: Reported on 12/27/2022), Disp: , Rfl:     The patient has No Known Allergies. Past Medical History  Lexie Miranda  has a past medical history of Carotid stenosis, left, Cataract, Cataracts, bilateral, Esophageal abnormality, GERD (gastroesophageal reflux disease), H/O blood clots, Hearing loss, Hypertension, Macular degeneration, Pneumonia, Skin cancer, Stroke ([address] TIA (transient ischemic attack), Ulcer, Unspecified cerebral artery occlusion with cerebral infarction, Unspecified sleep apnea, and Wears dentures. Subjective:      Review of Systems   Constitutional:  Negative for chills, fatigue, fever and weight loss. HENT:  Negative for facial swelling. Gastrointestinal:  Positive for diarrhea. Negative for abdominal pain, nausea and vomiting. Genitourinary:  Positive for frequency, hematuria, hesitancy and urgency. Negative for difficulty urinating, dysuria and flank pain. Objective:     /60 (Site: Right Upper Arm)   Pulse 72   Temp 98 °F (36.7 °C) (Oral)   Resp 16   Wt 137 lb 12.8 oz (62.5 kg)   BMI 20.35 kg/m²     Physical Exam  Vitals reviewed. Constitutional:       General: He is not in acute distress. Appearance: He is well-developed. HENT:      Head: Normocephalic and atraumatic. Eyes:      General:         Right eye: No discharge. Left eye: No discharge. Conjunctiva/sclera: Conjunctivae normal.   Pulmonary:      Effort: Pulmonary effort is normal. No respiratory distress. Breath sounds: Normal breath sounds. Abdominal:      General: Bowel sounds are normal.      Palpations: Abdomen is soft. Tenderness: There is no abdominal tenderness. There is no right CVA tenderness or left CVA tenderness.    Skin:     General: Skin is warm and dry.   Neurological:      General: No focal deficit present. Mental Status: He is alert and oriented to person, place, and time. Coordination: Coordination normal.   Psychiatric:         Mood and Affect: Mood normal.         Behavior: Behavior normal.         Thought Content: Thought content normal.         Judgment: Judgment normal.       Assessment/Plan:      Daisy Monroe was seen today for hematuria. Diagnoses and all orders for this visit:    Hematuria, unspecified type  -     POCT Urinalysis No Micro (Auto)  -     [address]eg[address] MD, Urology, [facility]  -     Culture, Urine    Acute cystitis with hematuria  -     ciprofloxacin (CIPRO) 250 MG tablet; Take 1 tablet by mouth 2 times daily for 10 days    Stage 3 chronic kidney disease, unspecified whether stage 3a or 3b CKD ([facility] [address]      - Called urology office and appt for 12/29/22 made for pt at 8:15 AM  - Increase fluids and start cipro at 250 mg BID due to renal function.   - Call office with any questions or concerns, or if symptoms are getting worse or changing  - OK to use imodium PRN diarrhea. Return if symptoms worsen or fail to improve. Patient given educational materials - see patient instructions. Discussed use, benefit, and side effects of prescribed medications. All patient questions answered. Pt voiced understanding.         Electronically signed by JOHNSON Pleitez CNP on 12/27/2022 at 3:02 PM

## 2022-12-29 ENCOUNTER — OFFICE VISIT (OUTPATIENT)
Dept: UROLOGY | Age: 87
End: 2022-12-29
Payer: MEDICARE

## 2022-12-29 ENCOUNTER — NURSE ONLY (OUTPATIENT)
Dept: LAB | Age: 87
End: 2022-12-29

## 2022-12-29 ENCOUNTER — TELEPHONE (OUTPATIENT)
Dept: UROLOGY | Age: 87
End: 2022-12-29

## 2022-12-29 VITALS — HEART RATE: 64 BPM | HEIGHT: 69 IN | WEIGHT: 135 LBS | BODY MASS INDEX: 19.99 KG/M2

## 2022-12-29 DIAGNOSIS — R35.0 URINARY FREQUENCY: ICD-10-CM

## 2022-12-29 DIAGNOSIS — R31.0 GROSS HEMATURIA: Primary | ICD-10-CM

## 2022-12-29 DIAGNOSIS — R31.0 GROSS HEMATURIA: ICD-10-CM

## 2022-12-29 LAB
ORGANISM: ABNORMAL
URINE CULTURE, ROUTINE: ABNORMAL
URINE CULTURE, ROUTINE: ABNORMAL

## 2022-12-29 PROCEDURE — 99204 OFFICE O/P NEW MOD 45 MIN: CPT | Performed by: UROLOGY

## 2022-12-29 PROCEDURE — G8427 DOCREV CUR MEDS BY ELIG CLIN: HCPCS | Performed by: UROLOGY

## 2022-12-29 PROCEDURE — G8420 CALC BMI NORM PARAMETERS: HCPCS | Performed by: UROLOGY

## 2022-12-29 PROCEDURE — G8484 FLU IMMUNIZE NO ADMIN: HCPCS | Performed by: UROLOGY

## 2022-12-29 PROCEDURE — 1123F ACP DISCUSS/DSCN MKR DOCD: CPT | Performed by: UROLOGY

## 2022-12-29 PROCEDURE — 1036F TOBACCO NON-USER: CPT | Performed by: UROLOGY

## 2022-12-29 NOTE — PROGRESS NOTES
Esau Amezcua MD   Urology Clinic office Visit      Patient:  Jesus Forrester  YOB: 1935  Date: 12/29/2022    HISTORY OF PRESENT ILLNESS:   The patient is a 82 y.o. male who presents today for evaluation of the following problem(s):      1. Gross hematuria    2. Urinary frequency           Overall the problem(s) : are worsening. Associated Symptoms: No dysuria, gross hematuria. Pain Severity:      Summary of old records: N/A  (Patient's old records, notes and chart reviewed and summarized above.)      Onset 1 month  Gross hematuria   constant  Severity is described as severe. The course of symptoms over time is constant. Alleviating factors: none  Worsening factors: none  Lower urinary tract symptoms: frequency  Former smoker    I independently reviewed and verified the images and reports from:    XR HAND RIGHT (MIN 3 VIEWS)    Result Date: 8/11/2022  PROCEDURE: XR HAND RIGHT (MIN 3 VIEWS) CLINICAL INFORMATION: Right hand pain . Recent fall. COMPARISON:  No prior study. TECHNIQUE:  3 views of the right hand. FINDINGS: There is mild angular deformity of the fifth metacarpal diaphysis right fibula basis of old injury. No fracture lucency is identified. There is otherwise normal alignment. No definite acute fracture is evident. There is a linear radiopaque density in the  soft tissues adjacent to the second proximal phalanx. There is joint space narrowing and sclerosis at the third metacarpal phalangeal joint. There is soft tissue swelling of the dorsum of the hand. 1. No definite acute osseous injury of the right hand. 2. Mild angular deformity of the fifth metacarpal likely representing old injury. 3. Linear radiopaque foreign body adjacent to the second proximal phalanx of indeterminate chronicity. **This report has been created using voice recognition software. It may contain minor errors which are inherent in voice recognition technology. ** Final report electronically signed by  Rebecca Jeter DO, MD on 8/11/2022 4:48 PM        Last several PSA's:  No results found for: PSA    Last total testosterone:  No results found for: TESTOSTERONE    Urinalysis today:  No results found for this visit on 12/29/22. Last BUN and creatinine:  Lab Results   Component Value Date    BUN 29 (H) 08/04/2022     Lab Results   Component Value Date    CREATININE 2.0 (H) 08/04/2022       Imaging Reviewed during this Office Visit:   (results were independently reviewed by physician and radiology report verified)    PAST MEDICAL, FAMILY AND SOCIAL HISTORY:  Past Medical History:   Diagnosis Date    Carotid stenosis, left     Cataract     Cataracts, bilateral     Esophageal abnormality     GERD (gastroesophageal reflux disease)     H/O blood clots     Hearing loss     Hypertension 4/13/2014    Macular degeneration     Pneumonia     Skin cancer     removal    Stroke ([facility] [address]     TIA (transient ischemic attack)     Ulcer     Unspecified cerebral artery occlusion with cerebral infarction NOV. 2013    Unspecified sleep apnea     Wears dentures     FULL UPPER     Past Surgical History:   Procedure Laterality Date    CAROTID ENDARTERECTOMY  01/13/2014    [facility]    CAROTID ENDARTERECTOMY N/A 7/15/2020    RIGHT CAROTID ENDARTERECTOMY performed by Josh Reyes MD at [facility] CATH POWER PICC TRIPLE  11/1/2013         SKIN CANCER EXCISION  2010    Basal Cell Carcinoma- Dr. Tiffanie Centeno    TUNNELED CENTRAL VENOUS CATHETER W/ SUBCUTANEOUS PORT       Family History   Problem Relation Age of Onset    No Known Problems Mother     No Known Problems Father     No Known Problems Sister     Breast Cancer Sister      Outpatient Medications Marked as Taking for the 12/29/22 encounter (Office Visit) with Igor Silva MD   Medication Sig Dispense Refill    ciprofloxacin (CIPRO) 250 MG tablet Take 1 tablet by mouth 2 times daily for 10 days 20 tablet 0    atorvastatin (LIPITOR) 40 MG tablet TAKE 1 TABLET EVERY DAY 90 tablet 0    folic acid (FOLVITE) 1 MG tablet Take 1 tablet by mouth daily 90 tablet 3    clopidogrel (PLAVIX) 75 MG tablet TAKE 1 TABLET EVERY DAY 90 tablet 3    Cholecalciferol (VITAMIN D3) 125 MCG (5000 UT) TABS Take by mouth         Patient has no known allergies. Social History     Tobacco Use   Smoking Status Former    Packs/day: 2.00    Years: 40.00    Pack years: 80.00    Types: Cigarettes    Start date: 1951    Quit date: 1/10/2000    Years since quittin.9   Smokeless Tobacco Never       Social History     Substance and Sexual Activity   Alcohol Use No    Alcohol/week: 0.0 standard drinks       REVIEW OF SYSTEMS:  Constitutional: negative  Eyes: negative  Respiratory: negative  Cardiovascular: negative  Gastrointestinal: negative  Musculoskeletal: negative  Genitourinary: negative except for what is in HPI  Skin: negative   Neurological: negative  Hematological/Lymphatic: negative  Psychological: negative    Physical Exam:    This a 82 y.o. male   Vitals:    22 0826   Pulse: 64     Constitutional: Patient in no acute distress; Neuro: alert and oriented to person place and time. Psych: Mood and affect normal.  Skin: Normal  Lungs: Respiratory effort normal  Cardiovascular:  Normal peripheral pulses  Abdomen: Soft, non-tender, non-distended   Bladder non-tender and not distended. Lymphatics: no palpable lymphadenopathy  Gait is within normal limits  Musculoskeletal: Normal range of motion       Assessment and Plan      1. Gross hematuria    2. Urinary frequency           Ct urogram  Cystoscopy  Check Cytology    Prescriptions Ordered:  No orders of the defined types were placed in this encounter.      Orders Placed:  Orders Placed This Encounter   Procedures    CT UROGRAM     Standing Status:   Future     Standing Expiration Date:   2023    Cytology, Non-Gyn     Order Specific Question:   PREVIOUS BIOPSY     Answer:   No     Order Specific Question:   PREOP DIAGNOSIS Answer:   hematuria     Order Specific Question:   FROZEN SECTION - NO OR YES/SPECIMEN     Answer: No              Justin Kate M.D, MD    No follow-ups on file.       Angus Jarrell MD  [facility]

## 2022-12-29 NOTE — TELEPHONE ENCOUNTER
Patient having a ct urogram end of January 2023. Do we need anything done prior. Please advise.  Thank you

## 2022-12-30 DIAGNOSIS — R31.0 GROSS HEMATURIA: Primary | ICD-10-CM

## 2022-12-30 NOTE — TELEPHONE ENCOUNTER
Called patient to go have bmp drawn first week of January 2023. His wife voiced understanding. Need to have done prior to having ct urogram. We will schedule after bmp drawn prior to 2-2-23 fu appt.  Gave to [facility].

## 2023-01-04 ENCOUNTER — NURSE ONLY (OUTPATIENT)
Dept: LAB | Age: 88
End: 2023-01-04

## 2023-01-04 DIAGNOSIS — R31.0 GROSS HEMATURIA: ICD-10-CM

## 2023-01-04 LAB
ANION GAP SERPL CALCULATED.3IONS-SCNC: 11 MEQ/L (ref 8–16)
BUN BLDV-MCNC: 28 MG/DL (ref 7–22)
CALCIUM SERPL-MCNC: 9.4 MG/DL (ref 8.5–10.5)
CHLORIDE BLD-SCNC: 103 MEQ/L (ref 98–111)
CO2: 26 MEQ/L (ref 23–33)
CREAT SERPL-MCNC: 2.1 MG/DL (ref 0.4–1.2)
GFR SERPL CREATININE-BSD FRML MDRD: 30 ML/MIN/1.73M2
GLUCOSE BLD-MCNC: 100 MG/DL (ref 70–108)
POTASSIUM SERPL-SCNC: 4.6 MEQ/L (ref 3.5–5.2)
SODIUM BLD-SCNC: 140 MEQ/L (ref 135–145)

## 2023-01-05 NOTE — TELEPHONE ENCOUNTER
Donny Griffiths is going to check with Dr Blaire Yeh and see what he wants to do. Checking to see if he wants to do something else.  Thank you

## 2023-01-19 DIAGNOSIS — R31.0 GROSS HEMATURIA: Primary | ICD-10-CM

## 2023-02-01 ENCOUNTER — NURSE ONLY (OUTPATIENT)
Dept: LAB | Age: 88
End: 2023-02-01

## 2023-02-01 DIAGNOSIS — M79.641 RIGHT HAND PAIN: ICD-10-CM

## 2023-02-01 DIAGNOSIS — N18.32 STAGE 3B CHRONIC KIDNEY DISEASE (HCC): ICD-10-CM

## 2023-02-01 LAB
ANION GAP SERPL CALC-SCNC: 14 MEQ/L (ref 8–16)
BUN SERPL-MCNC: 29 MG/DL (ref 7–22)
CALCIUM SERPL-MCNC: 9.4 MG/DL (ref 8.5–10.5)
CHLORIDE SERPL-SCNC: 103 MEQ/L (ref 98–111)
CO2 SERPL-SCNC: 22 MEQ/L (ref 23–33)
CREAT SERPL-MCNC: 1.9 MG/DL (ref 0.4–1.2)
GFR SERPL CREATININE-BSD FRML MDRD: 34 ML/MIN/1.73M2
GLUCOSE SERPL-MCNC: 103 MG/DL (ref 70–108)
POTASSIUM SERPL-SCNC: 3.9 MEQ/L (ref 3.5–5.2)
SODIUM SERPL-SCNC: 139 MEQ/L (ref 135–145)

## 2023-02-02 ENCOUNTER — HOSPITAL ENCOUNTER (OUTPATIENT)
Dept: CT IMAGING | Age: 88
Discharge: HOME | End: 2023-02-02
Payer: MEDICARE

## 2023-02-02 DIAGNOSIS — R31.0 GROSS HEMATURIA: ICD-10-CM

## 2023-02-02 PROCEDURE — 74176 CT ABD & PELVIS W/O CONTRAST: CPT

## 2023-02-16 ENCOUNTER — PROCEDURE VISIT (OUTPATIENT)
Dept: UROLOGY | Age: 88
End: 2023-02-16

## 2023-02-16 VITALS
SYSTOLIC BLOOD PRESSURE: 118 MMHG | DIASTOLIC BLOOD PRESSURE: 64 MMHG | BODY MASS INDEX: 19.99 KG/M2 | WEIGHT: 135 LBS | HEIGHT: 69 IN

## 2023-02-16 DIAGNOSIS — R35.0 URINARY FREQUENCY: ICD-10-CM

## 2023-02-16 DIAGNOSIS — R31.0 GROSS HEMATURIA: Primary | ICD-10-CM

## 2023-02-16 RX ORDER — SODIUM BICARBONATE 650 MG/1
650 TABLET ORAL 4 TIMES DAILY
COMMUNITY

## 2023-02-16 NOTE — PROGRESS NOTES
Dariusz Valdez MD   Urology Clinic office Visit      Patient:  Cecilia Foley  YOB: 1935  Date: 2/16/2023    HISTORY OF PRESENT ILLNESS:   The patient is a 82 y.o. male who presents today for evaluation of the following problem(s):      1. Gross hematuria    2. Urinary frequency           Overall the problem(s) : are worsening. Associated Symptoms: No dysuria, gross hematuria. Pain Severity:      Summary of old records: N/A  (Patient's old records, notes and chart reviewed and summarized above.)      Onset 1 month  Gross hematuria   constant  Severity is described as severe. The course of symptoms over time is constant. Alleviating factors: none  Worsening factors: none  Lower urinary tract symptoms: frequency  Former smoker    I independently reviewed and verified the images and reports from:    CT ABDOMEN PELVIS WO CONTRAST Additional Contrast? None    Result Date: 2/2/2023  PROCEDURE: CT ABDOMEN PELVIS WO CONTRAST CLINICAL INFORMATION: Gross hematuria. COMPARISON: No prior study. TECHNIQUE: 5 mm axial imaging through the abdomen and pelvis without IV contrast.  Coronal and sagittal reconstructions were performed. All CT scans at this facility use dose modulation, iterative reconstruction, and/or weight based dosing when appropriate to reduce the radiation dose to as low as reasonably achievable. FINDINGS: LIMITATIONS: The evaluation of the solid organs is limited without IV contrast. Lung bases: Dependent atelectasis is present. Liver/gallbladder/bilary tree: Cholelithiasis is present. No biliary ductal dilatation or pericholecystic inflammation is observed. Hepatic steatosis is unchanged. Detailed characterization of the liver parenchyma is limited without IV contrast. Pancreas: Unremarkable noncontrast CT appearance. Spleen : Unremarkable noncontrast CT appearance. Adrenal glands: Unremarkable noncontrast CT appearance. Kidneys/ ureters/ bladder:  Atherosclerotic vascular calcifications are present. No hydronephrosis or hydroureter is observed. A polypoid mass along the left posterior lateral wall of the urinary bladder measures 2 x 3 cm (series 2, image 73). Gastrointestinal:  Colonic diverticulosis without diverticulitis is observed. No bowel obstruction, free fluid, fluid collection, or free air is visualized. No secondary signs of acute appendicitis are observed. A small sliding-type hiatal hernia is present. Retroperitoneum / lymph nodes: The aorta appears ectatic with atherosclerotic calcification. No lymphadenopathy is visualized. Pelvis: The prostate gland is mildly enlarged measuring 3.2 x 4.6 cm. Musculoskeletal: The visualized skeletal structures appear intact. Diffuse osteopenia is observed. Multilevel degenerative disc disease is noted in the thoracic spine. 1. A polypoid mass arising from the left posterior lateral wall of the urinary bladder (series 2, image 73) measures 2.0 x 3.0 cm suspicious for urothelial neoplasm. Urologic consultation and cystoscopy is advised. 2. No obstructive uropathy is visualized. Scattered renal vascular calcifications are observed. 3. Colonic diverticulosis without diverticulitis. No bowel obstruction. 4. Detailed characterization of the solid organs is limited without IV contrast. Chronic findings are discussed. **This report has been created using voice recognition software. It may contain minor errors which are inherent in voice recognition technology. ** Final report electronically signed by Dr Gila Nick on 2/2/2023 1:56 PM        Last several PSA's:  No results found for: PSA    Last total testosterone:  No results found for: TESTOSTERONE    Urinalysis today:  No results found for this visit on 02/16/23.       Last BUN and creatinine:  Lab Results   Component Value Date    BUN 29 (H) 02/01/2023     Lab Results   Component Value Date    CREATININE 1.9 (H) 02/01/2023       Imaging Reviewed during this Office Visit:   (results were independently reviewed by physician and radiology report verified)    PAST MEDICAL, FAMILY AND SOCIAL HISTORY:  Past Medical History:   Diagnosis Date    Carotid stenosis, left     Cataract     Cataracts, bilateral     Esophageal abnormality     GERD (gastroesophageal reflux disease)     H/O blood clots     Hearing loss     Hypertension 4/13/2014    Macular degeneration     Pneumonia     Skin cancer     removal    Stroke ([address]     TIA (transient ischemic attack)     Ulcer     Unspecified cerebral artery occlusion with cerebral infarction NOV. 2013    Unspecified sleep apnea     Wears dentures     FULL UPPER     Past Surgical History:   Procedure Laterality Date    CAROTID ENDARTERECTOMY  01/13/2014    [facility]    CAROTID ENDARTERECTOMY N/A 7/15/2020    RIGHT CAROTID ENDARTERECTOMY performed by Broderick Leon MD at [address] CATH POWER PICC TRIPLE  11/1/2013         SKIN CANCER EXCISION  2010    Basal Cell Carcinoma- Dr. Julito Quinn    TUNNELED CENTRAL VENOUS CATHETER W/ SUBCUTANEOUS PORT       Family History   Problem Relation Age of Onset    No Known Problems Mother     No Known Problems Father     No Known Problems Sister     Breast Cancer Sister      Outpatient Medications Marked as Taking for the 2/16/23 encounter (Procedure visit) with Gonzalez Moreno MD   Medication Sig Dispense Refill    sodium bicarbonate 650 MG tablet Take 650 mg by mouth 4 times daily      atorvastatin (LIPITOR) 40 MG tablet TAKE 1 TABLET EVERY DAY 90 tablet 0    folic acid (FOLVITE) 1 MG tablet Take 1 tablet by mouth daily 90 tablet 3    clopidogrel (PLAVIX) 75 MG tablet TAKE 1 TABLET EVERY DAY 90 tablet 3    Cholecalciferol (VITAMIN D3) 125 MCG (5000 UT) TABS Take by mouth         Patient has no known allergies.   Social History     Tobacco Use   Smoking Status Former    Packs/day: 2.00    Years: 40.00    Pack years: 80.00    Types: Cigarettes    Start date: 4/24/1951    Quit date: 1/10/2000    Years since quittin.1   Smokeless Tobacco Never       Social History     Substance and Sexual Activity   Alcohol Use No    Alcohol/week: 0.0 standard drinks       REVIEW OF SYSTEMS:  Constitutional: negative  Eyes: negative  Respiratory: negative  Cardiovascular: negative  Gastrointestinal: negative  Musculoskeletal: negative  Genitourinary: negative except for what is in HPI  Skin: negative   Neurological: negative  Hematological/Lymphatic: negative  Psychological: negative    Physical Exam:    This a 82 y.o. male   Vitals:    23 0859   BP: 118/64     Constitutional: Patient in no acute distress;     Cystoscopy Operative Note    Position: supine  Findings:   The patient was prepped and draped in the usual sterile fashion. The flexible cystoscope was advanced through the urethra and into the bladder. The bladder was thoroughly inspected and the following was noted:    Urethra: No abnormalities of the urethra are noted. Prostate: obstructing  Bladder: mass arising from the left posterior lateral wall, large left side, looks like migh  Ureters: right orfice seen, left orfice is not  The cystoscope was removed. The patient tolerated the procedure well. Assessment and Plan      1. Gross hematuria    2. Urinary frequency             Cytology High grade urothelial carcinoma. Cystoscopy transurethral resection of bladder tumor, may need left stent placement  Pre op clearance/needs to hold blood thinner prior    Prescriptions Ordered:  No orders of the defined types were placed in this encounter. Orders Placed:  Orders Placed This Encounter   Procedures    Donny Painting M.D, MD    No follow-ups on file.       Rosa Lao MD  [facility] Urology

## 2023-02-17 ENCOUNTER — TELEPHONE (OUTPATIENT)
Dept: UROLOGY | Age: 88
End: 2023-02-17

## 2023-02-17 DIAGNOSIS — Z01.818 PRE-OP TESTING: ICD-10-CM

## 2023-02-17 DIAGNOSIS — R31.0 GROSS HEMATURIA: Primary | ICD-10-CM

## 2023-02-17 NOTE — TELEPHONE ENCOUNTER
PT cancelled his most recent scheduled appt with Dr Soraida Winters. Last seen 4/22. Will need OV. LM for patient to return call.

## 2023-02-17 NOTE — TELEPHONE ENCOUNTER
DO NOT TAKE ASPIRIN,  FISH OIL, MOBIC,COUMADIN, IBUPROFEN, MOTRIN-LIKE DRUGS AND ANY MULTIVITAMINS OR OVER THE COUNTER SUPPLEMENTS 14 DAYS PRIOR TO SURGERY. MUST HAVE AN ADULT OVER THE AGE OF 18 WITH YOU AT THE TIME OF THE DISCHARGE AND WITH YOU AT HOME AFTER THE PROCEDURE FOR 24 HOURS     **HOLD PLAVIX FOR 5 DAYS PRIOR TO SURGERY OF [place] BY Munira Coleman CNP**     [facility] [place] 1935 Diagnosis:     Surgical Physician: Dr. Vandana Craig have been scheduled for the procedure marked below:      Surgery: Cystoscopy transurethral resection of bladder tumor          Date: 3/9/23     Anesthesia: Anesthesiologist (General/Spinal)     Place of Service: [address] [place] [address] Second Floor Same Day Surgery         Arrive to same day surgery by:  9:30 AM  (Surgery time is subject to change)      INSTRUCTIONS AS MARKED BELOW:    1.  DO NOT eat or drink anything after midnight before surgery. 2.  We prefer you shower or bathe with an antibacterial soap (Dial) the morning of surgery. 3  Please bring a current medication list, photo ID and insurance card(s) with you  4. Okay to take Tylenol  5. If you take Glucophage, Metformin or Janumet, hold 48-hours prior to surgery  6  Take blood pressure or heart medication as directed, if taken in the morning take with a small sip of water  7. The office will call you in 1-2 days after your procedure to schedule a follow up. DATE SENSITIVE TESTING-DO ON THE DATE LISTED *WALK IN *NO APPOINTMENT    DO PRE OP URINE CULTURE AND CBC ON 2/23/23.  ORDERS INCLUDED        Date: 2/17/2023

## 2023-02-17 NOTE — TELEPHONE ENCOUNTER
Patient is scheduled for surgery with Dr Martir Angel on 3/9/23. Surgery consent on arrival. Patient to do pre op urine culture and CBC on 2/23/23. Surgery instructions gone over verbally and mailed to patient. Patient will have an adult over the age of 25 with them at discharge and 24 hours after procedure. Tremaine Cunha to clear Plavix.

## 2023-02-17 NOTE — TELEPHONE ENCOUNTER
SURGERY [id number]  [address] [place] [address] [address] [facility] Cinthya [address] [place] [employer] [address]      Phone *[phone] *[phone]   Surgical Scheduling Direct Line Phone *[phone] Fax *[phone]      Enrique Lange 1935 male    [address]  [address] [employer] [address] [id number]  Marital Status:          Home Phone: [phone]      Cell Phone:    Telephone Information:   Mobile [phone]          Surgeon: Dr. Dejan Rene Surgery Date: 3/9/23   Time: 11:30 AM    Procedure: Cystoscopy transurethral resection of bladder tumor (45-60 min per Dr Dejan Rene)    Diagnosis: Gross hematuria, Bladder tumor     Important Medical History:  In [facility]    Special Inst/Equip:     CPT Codes:    94211  Latex Allergy: No     Cardiac Device:  No    Anesthesia:  General          Admission Type:  Same Day                        Admit Prior to Day of Surgery: No    Case Location:  Main OR            Preadmission Testing:  Phone Call          PAT Date and Time:______________________________________________________    PAT Confirmation #: ______________________________________________________    Post Op Visit: ___________________________________________________________    Need Preop Cardiac Clearance: No    Does Patient have Cardiologist/physician?      Charbel ORNELAS for Plavix    Surgery Confirmation #: __________________________________________________    Michaelene Christina: ________________________   Date: __________________________     [employer] Name: Medicare

## 2023-02-17 NOTE — TELEPHONE ENCOUNTER
Patient is scheduled for Cystoscopy transurethral resection of bladder tumor with Dr Hawley on 3/9/23. We are asking for clearance to be off Plavix for 5 days prior. Thank you.

## 2023-02-20 ENCOUNTER — OFFICE VISIT (OUTPATIENT)
Dept: CARDIOLOGY CLINIC | Age: 88
End: 2023-02-20
Payer: MEDICARE

## 2023-02-20 VITALS
DIASTOLIC BLOOD PRESSURE: 74 MMHG | WEIGHT: 135.6 LBS | SYSTOLIC BLOOD PRESSURE: 118 MMHG | HEIGHT: 68 IN | BODY MASS INDEX: 20.55 KG/M2 | HEART RATE: 63 BPM

## 2023-02-20 DIAGNOSIS — I73.9 PVD (PERIPHERAL VASCULAR DISEASE) (HCC): ICD-10-CM

## 2023-02-20 DIAGNOSIS — E78.01 FAMILIAL HYPERCHOLESTEROLEMIA: ICD-10-CM

## 2023-02-20 DIAGNOSIS — Z01.818 PRE-OP TESTING: ICD-10-CM

## 2023-02-20 DIAGNOSIS — I10 PRIMARY HYPERTENSION: Primary | ICD-10-CM

## 2023-02-20 PROCEDURE — 93000 ELECTROCARDIOGRAM COMPLETE: CPT | Performed by: NUCLEAR MEDICINE

## 2023-02-20 PROCEDURE — 3074F SYST BP LT 130 MM HG: CPT | Performed by: NUCLEAR MEDICINE

## 2023-02-20 PROCEDURE — G8427 DOCREV CUR MEDS BY ELIG CLIN: HCPCS | Performed by: NUCLEAR MEDICINE

## 2023-02-20 PROCEDURE — 99214 OFFICE O/P EST MOD 30 MIN: CPT | Performed by: NUCLEAR MEDICINE

## 2023-02-20 PROCEDURE — G8484 FLU IMMUNIZE NO ADMIN: HCPCS | Performed by: NUCLEAR MEDICINE

## 2023-02-20 PROCEDURE — 1123F ACP DISCUSS/DSCN MKR DOCD: CPT | Performed by: NUCLEAR MEDICINE

## 2023-02-20 PROCEDURE — 1036F TOBACCO NON-USER: CPT | Performed by: NUCLEAR MEDICINE

## 2023-02-20 PROCEDURE — 3078F DIAST BP <80 MM HG: CPT | Performed by: NUCLEAR MEDICINE

## 2023-02-20 PROCEDURE — G8420 CALC BMI NORM PARAMETERS: HCPCS | Performed by: NUCLEAR MEDICINE

## 2023-02-20 NOTE — PROGRESS NOTES
[id number]  [address]  [facility] [address]  Dept: [phone]  Dept Fax: [phone]  Loc: [phone]    Visit Date: 2023    Juliane Galeas is a 82 y.o. male who presents todayfor:  Chief Complaint   Patient presents with    Check-Up    Cardiac Clearance    Hypertension    Hyperlipidemia     Going for cystoscopy for possible bladder cancer  Hematuria  Know CEA and stroke  No chest pain   Some baseline dyspnea  Limitation from the stroke  Know HTN   Seems stable   No dizziness  No syncope  On statins for hyperlipidemia  No issues     HPI:  HPI  Past Medical History:   Diagnosis Date    Carotid stenosis, left     Cataract     Cataracts, bilateral     Esophageal abnormality     GERD (gastroesophageal reflux disease)     H/O blood clots     Hearing loss     Hypertension 2014    Macular degeneration     Pneumonia     Skin cancer     removal    Stroke ([address]     TIA (transient ischemic attack)     Ulcer     Unspecified cerebral artery occlusion with cerebral infarction 2013    Unspecified sleep apnea     Wears dentures     FULL UPPER      Past Surgical History:   Procedure Laterality Date    CAROTID ENDARTERECTOMY  2014    [facility]    CAROTID ENDARTERECTOMY N/A 7/15/2020    RIGHT CAROTID ENDARTERECTOMY performed by Kris Woodall MD at [facility] CATH POWER PICC TRIPLE  2013         SKIN CANCER EXCISION  2010    Basal Cell Carcinoma- Dr. Graham Dale    TUNNELED CENTRAL VENOUS CATHETER W/ SUBCUTANEOUS PORT       Family History   Problem Relation Age of Onset    No Known Problems Mother     No Known Problems Father     No Known Problems Sister     Breast Cancer Sister      Social History     Tobacco Use    Smoking status: Former     Packs/day: 2.00     Years: 40.00     Pack years: 80.00     Types: Cigarettes     Start date: 1951     Quit date: 1/10/2000     Years since quittin.1 Smokeless tobacco: Never   Substance Use Topics    Alcohol use: No     Alcohol/week: 0.0 standard drinks      Current Outpatient Medications   Medication Sig Dispense Refill    sodium bicarbonate 650 MG tablet Take 650 mg by mouth 4 times daily      atorvastatin (LIPITOR) 40 MG tablet TAKE 1 TABLET EVERY DAY 90 tablet 0    folic acid (FOLVITE) 1 MG tablet Take 1 tablet by mouth daily 90 tablet 3    clopidogrel (PLAVIX) 75 MG tablet TAKE 1 TABLET EVERY DAY 90 tablet 3    Cholecalciferol (VITAMIN D3) 125 MCG (5000 UT) TABS Take by mouth       No current facility-administered medications for this visit. No Known Allergies  Health Maintenance   Topic Date Due    Shingles vaccine (1 of 2) Never done    Lipids  12/02/2021    COVID-19 Vaccine (4 - Booster for Pfizer series) 01/10/2022    Flu vaccine (1) Never done    Depression Screen  03/29/2023    Annual Wellness Visit (AWV)  03/30/2023    DTaP/Tdap/Td vaccine (3 - Td or Tdap) 10/18/2028    Pneumococcal 65+ years Vaccine  Completed    Hepatitis A vaccine  Aged Out    Hib vaccine  Aged Out    Meningococcal (ACWY) vaccine  Aged Out       Subjective:  General:   No fever, no chills, some fatigue or weight loss  Pulmonary:    some dyspnea, no wheezing  Cardiac:    Denies recent chest pain,   GI:     No nausea or vomiting, no abdominal pain  Neuro:    No dizziness or light headedness,   Musculoskeletal:  No recent active issues  Extremities:   No edema, no obvious claudication       Objective:  General:   Well developed, well nourished  Lungs:   Clear to auscultation  Heart:    Normal S1 S2, Slight murmur. no rubs, no gallops  Abdomen:   Soft, non tender, no organomegalies, positive bowel sounds  Extremities:   No edema, no cyanosis, good peripheral pulses  Neurological:   Awake, alert, oriented.  No obvious focal deficits  Musculoskelatal:  No obvious deformities   /74   Pulse 63   Ht 5' 8\" (1.727 m)   Wt 135 lb 9.6 oz (61.5 kg)   BMI 20.62 kg/m² Assessment:      Diagnosis Orders   1. Primary hypertension  EKG 12 Lead      2. Pre-op testing  EKG 12 Lead      3. Familial hypercholesterolemia        4. PVD (peripheral vascular disease) ([address]        As above  Higher risk patient   Risk for CAD  No active symptoms  Dealing with somehow urgent situation and hematuria  ECG in office was done today. I reviewed the ECG. No acute findings      Plan:  No follow-ups on file. As above  Discussed the risk at length   He needs the operation some how urgently   Continue risk factor modification and medical management  Thank you for allowing me to participate in the care of your patient. Please don't hesitate to contact me regarding any further issues related to the patient care    Orders Placed:  Orders Placed This Encounter   Procedures    EKG 12 Lead     Order Specific Question:   Reason for Exam?     Answer: Other       Prescribed:  No orders of the defined types were placed in this encounter. Discussed use, benefit, and side effects of prescribed medications. All patient questions answered. Pt voicedunderstanding. Instructed to continue current medications, diet and exercise. Continue risk factor modification and medical management. Patient agreed with treatment plan. Follow up as directed.     Electronically signedby Oskar Martini MD on 2/20/2023 at 10:38 AM

## 2023-02-21 ENCOUNTER — PREP FOR PROCEDURE (OUTPATIENT)
Dept: UROLOGY | Age: 88
End: 2023-02-21

## 2023-02-22 ENCOUNTER — NURSE ONLY (OUTPATIENT)
Dept: LAB | Age: 88
End: 2023-02-22

## 2023-02-22 DIAGNOSIS — R31.0 GROSS HEMATURIA: ICD-10-CM

## 2023-02-22 DIAGNOSIS — Z01.818 PRE-OP TESTING: ICD-10-CM

## 2023-02-22 LAB
ANISOCYTOSIS BLD QL SMEAR: PRESENT
BASOPHILS ABSOLUTE: 0 THOU/MM3 (ref 0–0.1)
BASOPHILS NFR BLD AUTO: 0.7 %
BURR CELLS BLD QL SMEAR: ABNORMAL
DEPRECATED RDW RBC AUTO: 46.4 FL (ref 35–45)
EOSINOPHIL NFR BLD AUTO: 3 %
EOSINOPHILS ABSOLUTE: 0.2 THOU/MM3 (ref 0–0.4)
ERYTHROCYTE [DISTWIDTH] IN BLOOD BY AUTOMATED COUNT: 16.5 % (ref 11.5–14.5)
HCT VFR BLD AUTO: 29.3 % (ref 42–52)
HGB BLD-MCNC: 8.4 GM/DL (ref 14–18)
HYPOCHROMIA BLD QL SMEAR: PRESENT
IMM GRANULOCYTES # BLD AUTO: 0.05 THOU/MM3 (ref 0–0.07)
IMM GRANULOCYTES NFR BLD AUTO: 0.8 %
LYMPHOCYTES ABSOLUTE: 2.6 THOU/MM3 (ref 1–4.8)
LYMPHOCYTES NFR BLD AUTO: 43.6 %
MCH RBC QN AUTO: 22.1 PG (ref 26–33)
MCHC RBC AUTO-ENTMCNC: 28.7 GM/DL (ref 32.2–35.5)
MCV RBC AUTO: 77.1 FL (ref 80–94)
MONOCYTES ABSOLUTE: 0.9 THOU/MM3 (ref 0.4–1.3)
MONOCYTES NFR BLD AUTO: 14.2 %
NEUTROPHILS NFR BLD AUTO: 37.7 %
NRBC BLD AUTO-RTO: 0 /100 WBC
PLATELET # BLD AUTO: 145 THOU/MM3 (ref 130–400)
PLATELET BLD QL SMEAR: ADEQUATE
PMV BLD AUTO: ABNORMAL FL (ref 9.4–12.4)
RBC # BLD AUTO: 3.8 MILL/MM3 (ref 4.7–6.1)
SCAN OF BLOOD SMEAR: NORMAL
SEGMENTED NEUTROPHILS ABSOLUTE COUNT: 2.3 THOU/MM3 (ref 1.8–7.7)
WBC # BLD AUTO: 6 THOU/MM3 (ref 4.8–10.8)

## 2023-02-24 LAB
BACTERIA UR CULT: ABNORMAL
ORGANISM: ABNORMAL

## 2023-02-27 ENCOUNTER — TELEPHONE (OUTPATIENT)
Dept: UROLOGY | Age: 88
End: 2023-02-27

## 2023-02-27 RX ORDER — SODIUM CHLORIDE 0.9 % (FLUSH) 0.9 %
5-40 SYRINGE (ML) INJECTION EVERY 12 HOURS SCHEDULED
Status: CANCELLED | OUTPATIENT
Start: 2023-02-27

## 2023-02-27 RX ORDER — SODIUM CHLORIDE 0.9 % (FLUSH) 0.9 %
5-40 SYRINGE (ML) INJECTION PRN
Status: CANCELLED | OUTPATIENT
Start: 2023-02-27

## 2023-02-27 RX ORDER — SODIUM CHLORIDE 9 MG/ML
INJECTION, SOLUTION INTRAVENOUS PRN
Status: CANCELLED | OUTPATIENT
Start: 2023-02-27

## 2023-02-27 NOTE — TELEPHONE ENCOUNTER
Please review urine culture on 2/22/23. Surgery with Dr Castillo Vasquez on 3/9/23 for a Cystoscopy transurethral resection of bladder tumor  Thanks.

## 2023-02-28 DIAGNOSIS — E72.11 HYPERHOMOCYSTEINEMIA (HCC): ICD-10-CM

## 2023-02-28 DIAGNOSIS — I63.9 CEREBRAL INFARCTION, UNSPECIFIED MECHANISM (HCC): ICD-10-CM

## 2023-02-28 RX ORDER — SODIUM BICARBONATE 650 MG/1
TABLET ORAL
Qty: 180 TABLET | OUTPATIENT
Start: 2023-02-28

## 2023-02-28 RX ORDER — CIPROFLOXACIN 500 MG/1
500 TABLET, FILM COATED ORAL 2 TIMES DAILY
Qty: 14 TABLET | Refills: 0 | Status: SHIPPED | OUTPATIENT
Start: 2023-02-28 | End: 2023-03-07

## 2023-03-01 RX ORDER — CLOPIDOGREL BISULFATE 75 MG/1
TABLET ORAL
Qty: 90 TABLET | Refills: 3 | Status: SHIPPED | OUTPATIENT
Start: 2023-03-01

## 2023-03-01 RX ORDER — FOLIC ACID 1 MG/1
TABLET ORAL
Qty: 90 TABLET | Refills: 3 | Status: SHIPPED | OUTPATIENT
Start: 2023-03-01

## 2023-03-01 NOTE — TELEPHONE ENCOUNTER
This medication refill is regarding a electronic request. Refill requested by  [employer] . Requested Prescriptions     Pending Prescriptions Disp Refills    clopidogrel (PLAVIX) 75 MG tablet [Pharmacy Med Name: CLOPIDOGREL 75 MG Tablet] 90 tablet 3     Sig: TAKE 1 TABLET EVERY DAY    folic acid (FOLVITE) 1 MG tablet [Pharmacy Med Name: FOLIC ACID 1 MG Tablet] 90 tablet 3     Sig: TAKE 1 TABLET EVERY DAY     Date of last visit: 12/27/2022   Date of next visit: None  Date of last refill:    -Clopidogrel 4/46/25 #55/0   -Folic acid 9/94/30 #49/3    Rx verified, ordered and set to EP.

## 2023-03-06 NOTE — PROGRESS NOTES
PAT call attempted, patient unavailable, left message to please call us back at your earliest convenience; [phone]

## 2023-03-06 NOTE — PROGRESS NOTES
Follow all instructions given by your physician  NPO after midnight   Sips of water am of surgery with allowed medications  Bring insurance info and 's license  Wear comfortable clean, loose fitting clothing  No jewelry or contact lenses to be worn day of surgery  No glue on dentures morning of surgery;you will be asked to remove them for surgery. Case for glasses. Shower night before and morning of surgery with a liquid antibacterial soap, dry with fresh clean towel; no lotions, creams or powder. Clean sheets and pillow case on bed night before surgery  Bring medications in original bottles  Bring CPAP/BIPAP machine if you have one ( you may be charged if one is needed in recovery room )    Please refer to the SSI-Surgical Site Infection Flyer you hopefully received in the mail-together we can prevent infections; signs and symptoms reviewed. When discharged be sure you understand how to care for your wound and that you have the phone # to call if you have any concerns or questions about your wound.  needed at discharge and someone over 18 to stay with you for 24 hours overnight (surgery may be cancelled if you don't have this)  Report to [facility] on 2nd floor  If you would become ill prior to surgery, please call the surgeon  May have a visitor with you, we request that you limit to 2 visitors in pre-op area  Masks are recommended but not required, new masks at entrance desk  Call [phone] for any questions    Covid questionnaire Complete; Patient negative for symptoms or exposure. See documentation.

## 2023-03-09 ENCOUNTER — HOSPITAL ENCOUNTER (OUTPATIENT)
Age: 88
Setting detail: OUTPATIENT SURGERY
Discharge: HOME | End: 2023-03-09
Attending: UROLOGY | Admitting: UROLOGY
Payer: MEDICARE

## 2023-03-09 ENCOUNTER — ANESTHESIA (OUTPATIENT)
Dept: OPERATING ROOM | Age: 88
End: 2023-03-09
Payer: MEDICARE

## 2023-03-09 ENCOUNTER — ANESTHESIA EVENT (OUTPATIENT)
Dept: OPERATING ROOM | Age: 88
End: 2023-03-09
Payer: MEDICARE

## 2023-03-09 VITALS
DIASTOLIC BLOOD PRESSURE: 51 MMHG | HEIGHT: 68 IN | BODY MASS INDEX: 21.22 KG/M2 | WEIGHT: 140 LBS | RESPIRATION RATE: 16 BRPM | TEMPERATURE: 97.8 F | SYSTOLIC BLOOD PRESSURE: 147 MMHG | HEART RATE: 66 BPM | OXYGEN SATURATION: 93 %

## 2023-03-09 DIAGNOSIS — R31.0 GROSS HEMATURIA: ICD-10-CM

## 2023-03-09 DIAGNOSIS — D49.4 BLADDER TUMOR: ICD-10-CM

## 2023-03-09 LAB — INR PPP: 1.03 (ref 0.85–1.13)

## 2023-03-09 PROCEDURE — 85610 PROTHROMBIN TIME: CPT

## 2023-03-09 PROCEDURE — 2500000003 HC RX 250 WO HCPCS

## 2023-03-09 PROCEDURE — 7100000011 HC PHASE II RECOVERY - ADDTL 15 MIN: Performed by: UROLOGY

## 2023-03-09 PROCEDURE — 2580000003 HC RX 258: Performed by: UROLOGY

## 2023-03-09 PROCEDURE — 7100000000 HC PACU RECOVERY - FIRST 15 MIN: Performed by: UROLOGY

## 2023-03-09 PROCEDURE — 2720000010 HC SURG SUPPLY STERILE: Performed by: UROLOGY

## 2023-03-09 PROCEDURE — 7100000001 HC PACU RECOVERY - ADDTL 15 MIN: Performed by: UROLOGY

## 2023-03-09 PROCEDURE — 3700000000 HC ANESTHESIA ATTENDED CARE: Performed by: UROLOGY

## 2023-03-09 PROCEDURE — 2709999900 HC NON-CHARGEABLE SUPPLY: Performed by: UROLOGY

## 2023-03-09 PROCEDURE — 7100000010 HC PHASE II RECOVERY - FIRST 15 MIN: Performed by: UROLOGY

## 2023-03-09 PROCEDURE — 3600000003 HC SURGERY LEVEL 3 BASE: Performed by: UROLOGY

## 2023-03-09 PROCEDURE — 3700000001 HC ADD 15 MINUTES (ANESTHESIA): Performed by: UROLOGY

## 2023-03-09 PROCEDURE — 6360000002 HC RX W HCPCS

## 2023-03-09 PROCEDURE — 3600000013 HC SURGERY LEVEL 3 ADDTL 15MIN: Performed by: UROLOGY

## 2023-03-09 PROCEDURE — 6360000002 HC RX W HCPCS: Performed by: UROLOGY

## 2023-03-09 PROCEDURE — 88307 TISSUE EXAM BY PATHOLOGIST: CPT

## 2023-03-09 PROCEDURE — 36415 COLL VENOUS BLD VENIPUNCTURE: CPT

## 2023-03-09 RX ORDER — SODIUM CHLORIDE 0.9 % (FLUSH) 0.9 %
5-40 SYRINGE (ML) INJECTION EVERY 12 HOURS SCHEDULED
Status: DISCONTINUED | OUTPATIENT
Start: 2023-03-09 | End: 2023-03-09 | Stop reason: HOSPADM

## 2023-03-09 RX ORDER — SODIUM CHLORIDE 0.9 % (FLUSH) 0.9 %
5-40 SYRINGE (ML) INJECTION PRN
Status: DISCONTINUED | OUTPATIENT
Start: 2023-03-09 | End: 2023-03-09 | Stop reason: HOSPADM

## 2023-03-09 RX ORDER — ONDANSETRON 2 MG/ML
4 INJECTION INTRAMUSCULAR; INTRAVENOUS
Status: DISCONTINUED | OUTPATIENT
Start: 2023-03-09 | End: 2023-03-09 | Stop reason: HOSPADM

## 2023-03-09 RX ORDER — LABETALOL HYDROCHLORIDE 5 MG/ML
10 INJECTION, SOLUTION INTRAVENOUS
Status: DISCONTINUED | OUTPATIENT
Start: 2023-03-09 | End: 2023-03-09 | Stop reason: HOSPADM

## 2023-03-09 RX ORDER — CIPROFLOXACIN 500 MG/1
500 TABLET, FILM COATED ORAL 2 TIMES DAILY
Qty: 10 TABLET | Refills: 0 | Status: SHIPPED | OUTPATIENT
Start: 2023-03-09 | End: 2023-03-14

## 2023-03-09 RX ORDER — ROCURONIUM BROMIDE 10 MG/ML
INJECTION, SOLUTION INTRAVENOUS PRN
Status: DISCONTINUED | OUTPATIENT
Start: 2023-03-09 | End: 2023-03-09 | Stop reason: SDUPTHER

## 2023-03-09 RX ORDER — HYDRALAZINE HYDROCHLORIDE 20 MG/ML
10 INJECTION INTRAMUSCULAR; INTRAVENOUS
Status: DISCONTINUED | OUTPATIENT
Start: 2023-03-09 | End: 2023-03-09 | Stop reason: HOSPADM

## 2023-03-09 RX ORDER — PHENAZOPYRIDINE HYDROCHLORIDE 200 MG/1
200 TABLET, FILM COATED ORAL 3 TIMES DAILY PRN
Qty: 9 TABLET | Refills: 0 | Status: SHIPPED | OUTPATIENT
Start: 2023-03-09

## 2023-03-09 RX ORDER — ONDANSETRON 2 MG/ML
INJECTION INTRAMUSCULAR; INTRAVENOUS PRN
Status: DISCONTINUED | OUTPATIENT
Start: 2023-03-09 | End: 2023-03-09 | Stop reason: SDUPTHER

## 2023-03-09 RX ORDER — SODIUM CHLORIDE 9 MG/ML
INJECTION, SOLUTION INTRAVENOUS PRN
Status: DISCONTINUED | OUTPATIENT
Start: 2023-03-09 | End: 2023-03-09 | Stop reason: HOSPADM

## 2023-03-09 RX ORDER — FENTANYL CITRATE 50 UG/ML
INJECTION, SOLUTION INTRAMUSCULAR; INTRAVENOUS PRN
Status: DISCONTINUED | OUTPATIENT
Start: 2023-03-09 | End: 2023-03-09 | Stop reason: SDUPTHER

## 2023-03-09 RX ORDER — PROPOFOL 10 MG/ML
INJECTION, EMULSION INTRAVENOUS PRN
Status: DISCONTINUED | OUTPATIENT
Start: 2023-03-09 | End: 2023-03-09 | Stop reason: SDUPTHER

## 2023-03-09 RX ORDER — DEXAMETHASONE SODIUM PHOSPHATE 4 MG/ML
INJECTION, SOLUTION INTRA-ARTICULAR; INTRALESIONAL; INTRAMUSCULAR; INTRAVENOUS; SOFT TISSUE PRN
Status: DISCONTINUED | OUTPATIENT
Start: 2023-03-09 | End: 2023-03-09 | Stop reason: SDUPTHER

## 2023-03-09 RX ORDER — HYDRALAZINE HYDROCHLORIDE 20 MG/ML
INJECTION INTRAMUSCULAR; INTRAVENOUS PRN
Status: DISCONTINUED | OUTPATIENT
Start: 2023-03-09 | End: 2023-03-09 | Stop reason: SDUPTHER

## 2023-03-09 RX ADMIN — PROPOFOL 70 MG: 10 INJECTION, EMULSION INTRAVENOUS at 11:21

## 2023-03-09 RX ADMIN — FENTANYL CITRATE 50 MCG: 50 INJECTION, SOLUTION INTRAMUSCULAR; INTRAVENOUS at 11:42

## 2023-03-09 RX ADMIN — ROCURONIUM BROMIDE 50 MG: 10 INJECTION INTRAVENOUS at 11:21

## 2023-03-09 RX ADMIN — SODIUM CHLORIDE: 9 INJECTION, SOLUTION INTRAVENOUS at 10:12

## 2023-03-09 RX ADMIN — Medication 2000 MG: at 11:26

## 2023-03-09 RX ADMIN — HYDRALAZINE HYDROCHLORIDE 5 MG: 20 INJECTION, SOLUTION INTRAMUSCULAR; INTRAVENOUS at 11:53

## 2023-03-09 RX ADMIN — Medication 100 MG: at 11:21

## 2023-03-09 RX ADMIN — ONDANSETRON 4 MG: 2 INJECTION INTRAMUSCULAR; INTRAVENOUS at 11:53

## 2023-03-09 RX ADMIN — DEXAMETHASONE SODIUM PHOSPHATE 10 MG: 4 INJECTION, SOLUTION INTRAMUSCULAR; INTRAVENOUS at 11:21

## 2023-03-09 RX ADMIN — PROPOFOL 50 MG: 10 INJECTION, EMULSION INTRAVENOUS at 11:46

## 2023-03-09 RX ADMIN — FENTANYL CITRATE 50 MCG: 50 INJECTION, SOLUTION INTRAMUSCULAR; INTRAVENOUS at 11:21

## 2023-03-09 RX ADMIN — SUGAMMADEX 200 MG: 100 INJECTION, SOLUTION INTRAVENOUS at 12:05

## 2023-03-09 ASSESSMENT — PAIN - FUNCTIONAL ASSESSMENT: PAIN_FUNCTIONAL_ASSESSMENT: 0-10

## 2023-03-09 ASSESSMENT — PAIN SCALES - GENERAL
PAINLEVEL_OUTOF10: 0

## 2023-03-09 ASSESSMENT — ENCOUNTER SYMPTOMS: SHORTNESS OF BREATH: 1

## 2023-03-09 NOTE — ANESTHESIA PRE PROCEDURE
Department of Anesthesiology  Preprocedure Note       Name:  Edgar Scanlon   Age:  82 y.o.  :  1935                                          MRN:  [id number]         Date:  3/9/2023      Surgeon: Rm Huber):  Nadege Gallegos MD    Procedure: Procedure(s):  RIGHT CAROTID ENDARTERECTOMY    Medications prior to admission:   Prior to Admission medications    Medication Sig Start Date End Date Taking? Authorizing Provider   clopidogrel (PLAVIX) 75 MG tablet TAKE 1 TABLET EVERY DAY 3/1/23   JOHNSON Lincoln CNP   folic acid (FOLVITE) 1 MG tablet TAKE 1 TABLET EVERY DAY 3/1/23   JOHNSON Lincoln CNP   sodium bicarbonate 650 MG tablet Take 650 mg by mouth daily    Historical Provider, MD   atorvastatin (LIPITOR) 40 MG tablet TAKE 1 TABLET EVERY DAY  Patient taking differently: Take 40 mg by mouth at bedtime 22   JOHNSON Lincoln CNP   Cholecalciferol (VITAMIN D3) 125 MCG (5000 UT) TABS Take by mouth    Historical Provider, MD       Current medications:    No current facility-administered medications for this visit. No current outpatient medications on file.      Facility-Administered Medications Ordered in Other Visits   Medication Dose Route Frequency Provider Last Rate Last Admin    sodium chloride flush 0.9 % injection 5-40 mL  5-40 mL IntraVENous 2 times per day Nadege Gallegos MD        sodium chloride flush 0.9 % injection 5-40 mL  5-40 mL IntraVENous PRN Nadege Gallegos MD        0.9 % sodium chloride infusion   IntraVENous PRN Nadege Gallegos  mL/hr at 23 1012 New Bag at 23 1012    ceFAZolin (ANCEF) 2000 mg in 0.9% sodium chloride 50 mL IVPB  2,000 mg IntraVENous On Call to [id number] [address], MD           Allergies:  No Known Allergies    Problem List:    Patient Active Problem List   Diagnosis Code    Brain TIA G45.9    Cerebral infarction ([facility] I63.9    Hemiparesis, right ([facility] G81.91    Hypertension I10    History of CVA (cerebrovascular accident) Z86.73    Hyperhomocysteinemia ([facility]) E72.11    Gastroesophageal reflux disease K21.9    Myelodysplastic syndrome ([facility]) D46.9    H/O carotid stenosis Z86.79    Carotid stenosis, right I65.21    PVD (peripheral vascular disease) ([facility]) I73.9       Past Medical History:        Diagnosis Date    Carotid stenosis, left     Cataract     Cataracts, bilateral     Esophageal abnormality     GERD (gastroesophageal reflux disease)     H/O blood clots     Hearing loss     Hypertension 2014    Macular degeneration     Pneumonia     Skin cancer     removal    Stroke ([facility] [facility]     TIA (transient ischemic attack)     Ulcer     Unspecified cerebral artery occlusion with cerebral infarction 2013    Unspecified sleep apnea     Wears dentures     FULL UPPER       Past Surgical History:        Procedure Laterality Date    [facility]OMY  2014    [facility]    CAROTID ENDARTERECTOMY N/A 7/15/2020    RIGHT CAROTID ENDARTERECTOMY performed by Josey Fernandes MD at [facility] POWER PICC TRIPLE  2013         SKIN CANCER EXCISION  2010    Basal Cell Carcinoma- Dr. Kaity Johnson TUNNELED CENTRAL VENOUS CATHETER W/ SUBCUTANEOUS PORT         Social History:    Social History     Tobacco Use    Smoking status: Former     Packs/day: 2.00     Years: 40.00     Pack years: 80.00     Types: Cigarettes     Start date: 1951     Quit date: 1/10/2000     Years since quittin.1    Smokeless tobacco: Never   Substance Use Topics    Alcohol use: No     Comment: quit 9 1/2 years ago after his stroke                                Counseling given: Not Answered      Vital Signs (Current): There were no vitals filed for this visit.                                            BP Readings from Last 3 Encounters:   23 (!) 121/56   23 118/74   23 118/64       NPO Status:                                                                                 BMI:   Wt Readings from Last 3 Encounters:   03/09/23 140 lb (63.5 kg)   02/20/23 135 lb 9.6 oz (61.5 kg)   02/16/23 135 lb (61.2 kg)     There is no height or weight on file to calculate BMI.    CBC:   Lab Results   Component Value Date/Time    WBC 6.0 02/22/2023 11:26 AM    RBC 3.80 02/22/2023 11:26 AM    HGB 8.4 02/22/2023 11:26 AM    HCT 29.3 02/22/2023 11:26 AM    MCV 77.1 02/22/2023 11:26 AM    RDW 15.0 07/13/2022 03:10 PM     02/22/2023 11:26 AM       CMP:   Lab Results   Component Value Date/Time     02/01/2023 12:16 PM    K 3.9 02/01/2023 12:16 PM    K 4.7 07/15/2020 06:00 AM     02/01/2023 12:16 PM    CO2 22 02/01/2023 12:16 PM    BUN 29 02/01/2023 12:16 PM    CREATININE 1.9 02/01/2023 12:16 PM    GFRAA >60 01/14/2014 05:07 AM    LABGLOM 34 02/01/2023 12:16 PM    GLUCOSE 103 02/01/2023 12:16 PM    GLUCOSE 93 07/13/2022 03:10 PM    PROT 6.8 12/02/2020 11:52 AM    CALCIUM 9.4 02/01/2023 12:16 PM    BILITOT 0.4 12/02/2020 11:52 AM    ALKPHOS 108 12/02/2020 11:52 AM    AST 15 12/02/2020 11:52 AM    ALT 21 12/02/2020 11:52 AM       POC Tests: No results for input(s): POCGLU, POCNA, POCK, POCCL, POCBUN, POCHEMO, POCHCT in the last 72 hours.     Coags:   Lab Results   Component Value Date/Time    PROTIME 10.6 01/13/2014 11:42 AM    INR 1.03 03/09/2023 09:46 AM    APTT 29.2 07/15/2020 06:00 AM       HCG (If Applicable): No results found for: PREGTESTUR, PREGSERUM, HCG, HCGQUANT     ABGs: No results found for: PHART, PO2ART, SEW9MYH, QJH5XYS, BEART, F2QXSLQK     Type & Screen (If Applicable):  Lab Results   Component Value Date    LABRH POS 07/15/2020       Drug/Infectious Status (If Applicable):  No results found for: HIV, HEPCAB    COVID-19 Screening (If Applicable):   Lab Results   Component Value Date/Time    COVID19 NOT DETECTED 07/10/2020 10:09 AM         Anesthesia Evaluation  Patient summary reviewed and Nursing notes reviewed no history of anesthetic complications:   Airway: Mallampati: II  TM distance: >3 FB   Neck ROM: limited  Mouth opening: > = 3 FB   Dental:    (+) edentulous      Pulmonary:   (+) pneumonia:  shortness of breath: chronic,  sleep apnea:  rhonchi wheezes                           ROS comment: Previous smoker     Cardiovascular:  Exercise tolerance: poor (<4 METS),   (+) hypertension:,       ECG reviewed      Echocardiogram reviewed  Stress test reviewed       Beta Blocker:  Dose within 24 Hrs         Neuro/Psych:   (+) CVA: residual symptoms, TIA,             GI/Hepatic/Renal:   (+) GERD:,           Endo/Other:              Pt had no PAT visit        ROS comment: Myelodysplastic syndrome    Abdominal:             Vascular: negative vascular ROS. Other Findings:             Anesthesia Plan      general     ASA 3       Induction: intravenous. MIPS: Postoperative opioids intended and Prophylactic antiemetics administered. Anesthetic plan and risks discussed with patient. Plan discussed with CRNA. Asuncion Espinoza MD   3/9/2023        DOS STAFF ADDENDUM:    Chart reviewed. Pt seen and examined. Plan as above, discussed with CRNA. Discussed risks, benefits, alternatives, and personnel involved with patient, who agrees to proceed.       Asuncion Espinoza MD  Staff Anesthesiologist  10:49 AM

## 2023-03-09 NOTE — DISCHARGE INSTRUCTIONS
Call your doctor for the following:   Chills   Temperature greater than 101   Pain that is not tolerable despite taking pain medicine as ordered   Non draining shea with clots         No alcoholic beverages, no driving or operating machinery, no making important decisions for 24 hours. Take your prescribed medications (if any) as instructed. You may have a normal diet but should eat lightly on the day of surgery. Drink plenty of fluids. You may notice some burning or blood with urination, this is normal and should improve over next few days. You may also take motrin/ibuprofen for pain control, you can alternate this with your other pain medications. Be sure to take over the counter stool softeners if you notice constipation or hard stools. Follow up with Dr. Fatoumata Zamora, Home with shea; follow up on Monday for shea removal    Resume blood thinners in 5 days     Learning About Indwelling Urinary Catheter Care to Prevent Infection  Overview     A urinary catheter is a flexible plastic tube that's used to drain urine from your bladder when you can't urinate on your own. The catheter allows urine to drain from the bladder into a bag. Two types of drainage bags may be used with a urinary catheter. A bedside bag is a large bag that you can hang on the side of your bed or on a chair. You can use it overnight or anytime you will be sitting or lying down for a long time. A leg bag is a small bag that you can use during the day. It is usually attached to your thigh or calf and hidden under your clothes. Having a urinary catheter increases your risk of getting a urinary tract infection. Germs may get on the catheter and cause an infection in your bladder or kidneys. The longer you have a catheter, the more likely it is that you will get an infection. You can help prevent this problem with good hygiene and careful handling of your catheter and drainage bags.   How can you help prevent infection? Take care to stay clean  Always wash your hands well before and after you handle your catheter. Clean the skin around the catheter daily using soap and water. Dry with a clean towel afterward. You can shower with your catheter and drainage bag in place unless your doctor told you not to. When you clean around the catheter, check the surrounding skin for signs of infection. Look for things like pus and irritated, swollen, red, or tender skin around the catheter. Be careful with your drainage bag  Always keep the drainage bag below the level of your bladder. This will help keep urine from flowing back into your bladder. Check often to see that urine is flowing through the catheter into the drainage bag. Empty the drainage bag when it is half full. This will keep it from overflowing or backing up. When you empty the drainage bag, do not let the tubing or drain spout touch anything. Keep the cap that comes with the tubing, and cover the tip of the tubing when not in use. Be careful with your catheter  Do not unhook the catheter from the drain tube until you are ready to change the tubing and bag. That could let germs get into the tube. Make sure that the catheter tubing does not get twisted or kinked. Do not tug or pull on the catheter. And make sure that the drainage bag does not drag or pull on the catheter. Do not put powder or lotion on the skin around the catheter. Talk with your doctor about your options for sexual intercourse while wearing a catheter. How do you empty the bag? If your doctor has asked you to keep a record, write down the amount of urine in the bag before you empty it. Wash your hands before and after you touch the bag. Remove the drain spout from its sleeve at the bottom of the drainage bag. Open the valve on the drain spout. Let the urine flow out into the toilet or a container. Be careful not to let the tubing or drain spout touch anything.   After you empty the bag, close the valve. Then put the drain spout back into its sleeve at the bottom of the collection bag. How do you switch to a bedside bag for overnight use? Wash your hands before and after you handle the bags. Empty the leg bag that is attached to the tubing and catheter. Put a clean towel under the tubing attached to the leg bag. Use an alcohol wipe to clean the tip of the tubing attached to the bedside bag. To stop the flow of urine, pinch the catheter with your fingers just above the tubing connection. Use a twisting motion to disconnect the leg bag tubing from the catheter. Then securely connect the catheter to the tubing from the bedside bag. How do you clean a bedside bag? Many people clean their bedside bag in the morning if they switch to a leg bag. To clean a bedside drainage bag:  Remove the bedside bag and attach the leg bag. Fill the bedside bag with 2 parts vinegar and 3 parts water. Let it stand for 20 minutes. Empty the bag, and let it air dry. When should you call for help? Call your doctor now or seek immediate medical care if:    You have symptoms of a urinary infection. These may include:  Pain or burning when you urinate. A frequent need to urinate without being able to pass much urine. Pain in the flank, which is just below the rib cage and above the waist on either side of the back. Blood in your urine. A fever. Your urine smells bad. You see large blood clots in your urine. No urine or very little urine is flowing into the bag for 4 or more hours. Watch closely for changes in your health, and be sure to contact your doctor if:    The area around the catheter becomes irritated, swollen, red, or tender, or there is pus draining from it. Urine is leaking from the place where the catheter enters your body. Follow-up care is a key part of your treatment and safety. Be sure to make and go to all appointments, and call your doctor if you are having problems.  It's also a good idea to know your test results and keep a list of the medicines you take. Where can you learn more? Go to http://www.woods.com/ and enter U010 to learn more about \"Learning About Indwelling Urinary Catheter Care to Prevent Infection. \"  Current as of: June 16, 2022               Content Version: 13.5  © 0336-7523 [employer]. Care instructions adapted under license by [facility] ([facility]). If you have questions about a medical condition or this instruction, always ask your healthcare professional. [address] any warranty or liability for your use of this information.

## 2023-03-09 NOTE — H&P
History and Physical    Patient:  Ghislaine Ibarra  MRN: [id number]  YOB: 1935    CHIEF COMPLAINT:  bladder tumor    HISTORY OF PRESENT ILLNESS:   The patient is a 82 y.o. male who presents with as above, here for surgery    Patient's old records, notes and chart reviewed and summarized above. Past Medical History:    Past Medical History:   Diagnosis Date    Carotid stenosis, left     Cataract     Cataracts, bilateral     Esophageal abnormality     GERD (gastroesophageal reflux disease)     H/O blood clots     Hearing loss     Hypertension 04/13/2014    Macular degeneration     Pneumonia     Skin cancer     removal    Stroke [facility])     TIA (transient ischemic attack)     Ulcer     Unspecified cerebral artery occlusion with cerebral infarction 11/2013    Unspecified sleep apnea     Wears dentures     FULL UPPER       Past Surgical History:    Past Surgical History:   Procedure Laterality Date    CAROTID ENDARTERECTOMY  01/13/2014    [facility]    CAROTID ENDARTERECTOMY N/A 7/15/2020    RIGHT CAROTID ENDARTERECTOMY performed by Woody Herrera MD at [address]  11/1/2013         SKIN CANCER EXCISION  2010    [facility]- Dr. Vidal Spencer    TUNNELED CENTRAL VENOUS CATHETER W/ SUBCUTANEOUS PORT       Medications Prior to Admission:    Prior to Admission medications    Medication Sig Start Date End Date Taking?  Authorizing Provider   clopidogrel (PLAVIX) 75 MG tablet TAKE 1 TABLET EVERY DAY 3/1/23   Mortimer Griffiths, APRN - CNP   folic acid (FOLVITE) 1 MG tablet TAKE 1 TABLET EVERY DAY 3/1/23   Mortimer Griffiths, APRN - CNP   sodium bicarbonate 650 MG tablet Take 650 mg by mouth daily    Historical Provider, MD   atorvastatin (LIPITOR) 40 MG tablet TAKE 1 TABLET EVERY DAY  Patient taking differently: Take 40 mg by mouth at bedtime 12/14/22   Mortimer Griffiths, APRN - CNP   Cholecalciferol (VITAMIN D3) 125 MCG (5000 UT) TABS Take by mouth    Historical Provider, MD       Allergies:  Patient has no known allergies.     Social History:    Social History     Socioeconomic History    Marital status:      Spouse name: Marisela Caro    Number of children: 1    Years of education: 12    Highest education level: Not on file   Occupational History    Not on file   Tobacco Use    Smoking status: Former     Packs/day: 2.00     Years: 40.00     Pack years: 80.00     Types: Cigarettes     Start date: 1951     Quit date: 1/10/2000     Years since quittin.1    Smokeless tobacco: Never   Vaping Use    Vaping Use: Never used   Substance and Sexual Activity    Alcohol use: No     Comment: quit 9 1/2 years ago after his stroke    Drug use: No    Sexual activity: Never     Comment: - Monogamous   Other Topics Concern    Not on file   Social History Narrative    Not on file     Social Determinants of Health     Financial Resource Strain: Low Risk     Difficulty of Paying Living Expenses: Not hard at all   Food Insecurity: No Food Insecurity    Worried About Running Out of Food in the Last Year: Never true    Ran Out of Food in the Last Year: Never true   Transportation Needs: Not on file   Physical Activity: Inactive    Days of Exercise per Week: 0 days    Minutes of Exercise per Session: 0 min   Stress: Not on file   Social Connections: Not on file   Intimate Partner Violence: Not on file   Housing Stability: Not on file       Family History:    Family History   Problem Relation Age of Onset    No Known Problems Mother     No Known Problems Father     No Known Problems Sister     Breast Cancer Sister        REVIEW OF SYSTEMS:  Constitutional: negative  Eyes: negative  Respiratory: negative  Cardiovascular: negative  Gastrointestinal: negative  Genitourinary: see HPI  Musculoskeletal: negative  Skin: negative   Neurological: negative  Hematological/Lymphatic: negative  Psychological: negative    Physical Exam:      Patient Vitals for the past 24 hrs:   BP Temp Temp src Pulse Resp SpO2 Height Weight   03/09/23 0944 (!) 121/56 (!) 96.5 °F (35.8 °C) Temporal 67 18 98 % 5' 7.5\" (1.715 m) 140 lb (63.5 kg)     Constitutional: Patient in no acute distress; Neuro: alert and oriented to person place and time. Psych: Mood and affect normal.  Skin: Normal  Lungs: Respiratory effort normal, CTA  Cardiovascular:  Normal peripheral pulses; no murmur  Abdomen: Soft, non-tender, non-distended with no CVA, flank pain, hepatosplenomegaly or hernia. Kidneys normal.  Bladder non-tender and not distended. LABS:   No results for input(s): WBC, HGB, HCT, MCV, PLT in the last 72 hours. No results for input(s): NA, K, CL, CO2, PHOS, BUN, CREATININE, CA in the last 72 hours. No results found for: PSA        Urinalysis: No results for input(s): COLORU, PHUR, LABCAST, WBCUA, RBCUA, MUCUS, TRICHOMONAS, YEAST, BACTERIA, CLARITYU, SPECGRAV, LEUKOCYTESUR, UROBILINOGEN, Clorinda Socorro in the last 72 hours. Invalid input(s): NITRATE, GLUCOSEUKETONESUAMORPHOUS     -----------------------------------------------------------------      Assessment and Plan   Impression:    Patient Active Problem List   Diagnosis    Brain TIA    Cerebral infarction ([address]    Hemiparesis, right ([address]    Hypertension    History of CVA (cerebrovascular accident)    Hyperhomocysteinemia ([address]    Gastroesophageal reflux disease    Myelodysplastic syndrome ([address]    H/O carotid stenosis    Carotid stenosis, right    PVD (peripheral vascular disease) ([address]       Plan:   Risks: I discussed all the risks, benefits, alternatives and possible complications or surgery as well as expectations and post-op recovery.       Consent obtained; Cystoscopy transurethral resection of bladder tumor, may need left stent placement in OR today    Tylor Dougherty M.D, MD  11:08 AM 3/9/2023

## 2023-03-09 NOTE — PROGRESS NOTES
ADMITTED TO [facility] AND ORIENTED TO UNIT. SCDS ON. FALL AND ALLERGY BANDS ON. PT VERBALIZED APPROVAL FOR FIRST NAME, LAST INITIAL AND PHYSICIAN NAME ON UNIT WHITEBOARD.

## 2023-03-09 NOTE — PROGRESS NOTES
Pt returned to [facility] room 16. Vitals and assessment as charted. 0.9 infusing, @400ml to count from PACU. Pt has crackers and water. Family at the bedside. Pt and family verbalized understanding of discharge criteria and call light use. Call light in reach.

## 2023-03-09 NOTE — PROGRESS NOTES
1212 - pt arrives to pacu, respirations unlabored on 4 L NC,pt denies pain, VSS  CBI in place, 2800 ml remaining in bag 1, 2800 ml remaining in bag 2, 200 ml urine output from shea    1230 - pt denies pain at this time, pt resting on and off, easily awakens to verbal stimuli    1240 - CBI in place, 2200 ml remaining in bag 1, 2800 ml remaining in bag 2, 800 urine output from shea    1242 - pt meets criteria for discharge from pacu at this time, pt transported to [facility] [place] in stable condition

## 2023-03-09 NOTE — ANESTHESIA POSTPROCEDURE EVALUATION
Department of Anesthesiology  Postprocedure Note    Patient: Damon Lopez  MRN: [id number]  YOB: 1935  Date of evaluation: 3/9/2023      Procedure Summary     Date: 03/09/23 Room / Location: [facility] OR    Anesthesia Start: 7124 Anesthesia Stop: 7240    Procedure: Cystoscopy Transurethral Resection of Bladder Tumor Diagnosis:       Gross hematuria      Bladder tumor      (Gross hematuria [R31.0])      (Bladder tumor [D49.4])    Surgeons: Jignesh Leon MD Responsible Provider: Roberta Medrano MD    Anesthesia Type: General ASA Status: 3          Anesthesia Type: General    Venu Phase I: Venu Score: 10    Venu Phase II: Venu Score: 10      Anesthesia Post Evaluation    Patient location during evaluation: PACU  Patient participation: complete - patient participated  Level of consciousness: awake and alert  Airway patency: patent  Nausea & Vomiting: no nausea  Complications: no  Cardiovascular status: blood pressure returned to baseline and hemodynamically stable  Respiratory status: acceptable and spontaneous ventilation  Hydration status: euvolemic

## 2023-03-09 NOTE — PROGRESS NOTES
Pt has met discharge criteria and states he is ready for discharge to home. IV removed, gauze and tape applied. Dressed in own clothes and personal belongings gathered. Discharge instructions (with opioid medication education information) given to pt and family; pt and family verbalized understanding of discharge instructions, prescriptions and follow up appointments. Pt transported to discharge lobby by [facility] staff.

## 2023-03-13 ENCOUNTER — NURSE ONLY (OUTPATIENT)
Dept: UROLOGY | Age: 88
End: 2023-03-13

## 2023-03-13 PROCEDURE — 99999 PR OFFICE/OUTPT VISIT,PROCEDURE ONLY: CPT | Performed by: UROLOGY

## 2023-03-13 NOTE — PROGRESS NOTES
I have personally verified, reviewed, released, signed, authenticated, authorized, confirmed,finalized, and approved the actions of the [facility]. Patient has given me verbal consent to perform shea removal  Yes    15 cc of water deflated from shea balloon. 22 Fr shea removed without difficulty. Foreskin reduced back down? Yes      Pt will drink fluids and report to ER in 6-8 hours if patient unable to urinate. F/u as scheduled. Patient was a post op TURBT cath removal today, patient was informed if he is unable to urinate and has to report to ER to have them notify [facility] prior to re-placing the shea due to recent TURBT.

## 2023-03-16 ENCOUNTER — TELEPHONE (OUTPATIENT)
Dept: UROLOGY | Age: 88
End: 2023-03-16

## 2023-03-16 ENCOUNTER — OFFICE VISIT (OUTPATIENT)
Dept: UROLOGY | Age: 88
End: 2023-03-16
Payer: MEDICARE

## 2023-03-16 VITALS — WEIGHT: 140 LBS | HEIGHT: 68 IN | BODY MASS INDEX: 21.22 KG/M2 | RESPIRATION RATE: 18 BRPM

## 2023-03-16 DIAGNOSIS — C67.2 MALIGNANT NEOPLASM OF LATERAL WALL OF URINARY BLADDER (HCC): ICD-10-CM

## 2023-03-16 DIAGNOSIS — R31.0 GROSS HEMATURIA: Primary | ICD-10-CM

## 2023-03-16 PROCEDURE — 1123F ACP DISCUSS/DSCN MKR DOCD: CPT | Performed by: UROLOGY

## 2023-03-16 PROCEDURE — G8427 DOCREV CUR MEDS BY ELIG CLIN: HCPCS | Performed by: UROLOGY

## 2023-03-16 PROCEDURE — 1036F TOBACCO NON-USER: CPT | Performed by: UROLOGY

## 2023-03-16 PROCEDURE — G8420 CALC BMI NORM PARAMETERS: HCPCS | Performed by: UROLOGY

## 2023-03-16 PROCEDURE — G8484 FLU IMMUNIZE NO ADMIN: HCPCS | Performed by: UROLOGY

## 2023-03-16 PROCEDURE — 99215 OFFICE O/P EST HI 40 MIN: CPT | Performed by: UROLOGY

## 2023-03-16 NOTE — PROGRESS NOTES
Corby Stinson MD   Urology Clinic office Visit      Patient:  Orion Witt  YOB: 1935  Date: 3/16/2023    HISTORY OF PRESENT ILLNESS:   The patient is a 82 y.o. male who presents today for evaluation of the following problem(s):      1. Gross hematuria    2. Malignant neoplasm of lateral wall of urinary bladder (HCC)           Overall the problem(s) : are worsening. Associated Symptoms: No dysuria, gross hematuria. Pain Severity:      Summary of old records: N/A  (Patient's old records, notes and chart reviewed and summarized above.)      Onset 1 month  Gross hematuria   constant  Severity is described as severe. The course of symptoms over time is constant. Alleviating factors: none  Worsening factors: none  Lower urinary tract symptoms: frequency  Former smoker    I independently reviewed and verified the images and reports from:    CT ABDOMEN PELVIS WO CONTRAST Additional Contrast? None    Result Date: 2/2/2023  PROCEDURE: CT ABDOMEN PELVIS WO CONTRAST CLINICAL INFORMATION: Gross hematuria. COMPARISON: No prior study. TECHNIQUE: 5 mm axial imaging through the abdomen and pelvis without IV contrast.  Coronal and sagittal reconstructions were performed. All CT scans at this facility use dose modulation, iterative reconstruction, and/or weight based dosing when appropriate to reduce the radiation dose to as low as reasonably achievable. FINDINGS: LIMITATIONS: The evaluation of the solid organs is limited without IV contrast. Lung bases: Dependent atelectasis is present. Liver/gallbladder/bilary tree: Cholelithiasis is present. No biliary ductal dilatation or pericholecystic inflammation is observed. Hepatic steatosis is unchanged. Detailed characterization of the liver parenchyma is limited without IV contrast. Pancreas: Unremarkable noncontrast CT appearance. Spleen : Unremarkable noncontrast CT appearance. Adrenal glands: Unremarkable noncontrast CT appearance.  Kidneys/ ureters/ bladder: Atherosclerotic vascular calcifications are present. No hydronephrosis or hydroureter is observed. A polypoid mass along the left posterior lateral wall of the urinary bladder measures 2 x 3 cm (series 2, image 73). Gastrointestinal:  Colonic diverticulosis without diverticulitis is observed. No bowel obstruction, free fluid, fluid collection, or free air is visualized. No secondary signs of acute appendicitis are observed. A small sliding-type hiatal hernia is present. Retroperitoneum / lymph nodes: The aorta appears ectatic with atherosclerotic calcification. No lymphadenopathy is visualized. Pelvis: The prostate gland is mildly enlarged measuring 3.2 x 4.6 cm. Musculoskeletal: The visualized skeletal structures appear intact. Diffuse osteopenia is observed. Multilevel degenerative disc disease is noted in the thoracic spine. 1. A polypoid mass arising from the left posterior lateral wall of the urinary bladder (series 2, image 73) measures 2.0 x 3.0 cm suspicious for urothelial neoplasm. Urologic consultation and cystoscopy is advised. 2. No obstructive uropathy is visualized. Scattered renal vascular calcifications are observed. 3. Colonic diverticulosis without diverticulitis. No bowel obstruction. 4. Detailed characterization of the solid organs is limited without IV contrast. Chronic findings are discussed. **This report has been created using voice recognition software. It may contain minor errors which are inherent in voice recognition technology. ** Final report electronically signed by Dr Colette Castillo on 2/2/2023 1:56 PM        Last several PSA's:  No results found for: PSA    Last total testosterone:  No results found for: TESTOSTERONE    Urinalysis today:  No results found for this visit on 03/16/23.       Last BUN and creatinine:  Lab Results   Component Value Date    BUN 29 (H) 02/01/2023     Lab Results   Component Value Date    CREATININE 1.9 (H) 02/01/2023       Imaging Reviewed during this Office Visit:   (results were independently reviewed by physician and radiology report verified)    PAST MEDICAL, FAMILY AND SOCIAL HISTORY:  Past Medical History:   Diagnosis Date    Carotid stenosis, left     Cataract     Cataracts, bilateral     Esophageal abnormality     GERD (gastroesophageal reflux disease)     H/O blood clots     Hearing loss     Hypertension 04/13/2014    Macular degeneration     Pneumonia     Skin cancer     removal    Stroke [facility])     TIA (transient ischemic attack)     Ulcer     Unspecified cerebral artery occlusion with cerebral infarction 11/2013    Unspecified sleep apnea     Wears dentures     FULL UPPER     Past Surgical History:   Procedure Laterality Date    CAROTID ENDARTERECTOMY  01/13/2014    [facility]    CAROTID ENDARTERECTOMY N/A 7/15/2020    RIGHT CAROTID ENDARTERECTOMY performed by Jenny Orozco MD at [address] [place] [address] N/A 3/9/2023    Cystoscopy Transurethral Resection of Bladder Tumor performed by Bonnie Shah MD at [facility]. TRIPLE  11/1/2013         SKIN CANCER EXCISION  2010    [facility]- Dr. Mary Lepe    TUNNELED CENTRAL VENOUS CATHETER W/ SUBCUTANEOUS PORT       Family History   Problem Relation Age of Onset    No Known Problems Mother     No Known Problems Father     No Known Problems Sister     Breast Cancer Sister      Outpatient Medications Marked as Taking for the 3/16/23 encounter (Office Visit) with Bonnie Shah MD   Medication Sig Dispense Refill    phenazopyridine (PYRIDIUM) 200 MG tablet Take 1 tablet by mouth 3 times daily as needed for Pain 9 tablet 0    clopidogrel (PLAVIX) 75 MG tablet TAKE 1 TABLET EVERY DAY 90 tablet 3    folic acid (FOLVITE) 1 MG tablet TAKE 1 TABLET EVERY DAY 90 tablet 3    sodium bicarbonate 650 MG tablet Take 650 mg by mouth daily      atorvastatin (LIPITOR) 40 MG tablet TAKE 1 TABLET EVERY DAY (Patient taking differently: Take 40 mg by mouth at bedtime) 90 tablet 0    Cholecalciferol (VITAMIN D3) 125 MCG (5000 UT) TABS Take by mouth         Patient has no known allergies. Social History     Tobacco Use   Smoking Status Former    Packs/day: 2.00    Years: 40.00    Pack years: 80.00    Types: Cigarettes    Start date: 1951    Quit date: 1/10/2000    Years since quittin.1   Smokeless Tobacco Never       Social History     Substance and Sexual Activity   Alcohol Use No    Comment: quit 9 1/2 years ago after his stroke       REVIEW OF SYSTEMS:  Constitutional: negative  Eyes: negative  Respiratory: negative  Cardiovascular: negative  Gastrointestinal: negative  Musculoskeletal: negative  Genitourinary: negative except for what is in HPI  Skin: negative   Neurological: negative  Hematological/Lymphatic: negative  Psychological: negative    Physical Exam:    This a 82 y.o. male   Vitals:    23 1248   Resp: 18     Constitutional: Patient in no acute distress;     Cystoscopy previously: mass arising from the left posterior lateral wall, large left side, looks like migh          Assessment and Plan      1. Gross hematuria    2. Malignant neoplasm of lateral wall of urinary bladder (HCC)             Cytology High grade urothelial carcinoma. S/pCystoscopy transurethral resection of bladder tumor; large left lateral wall  Path reviewed Invasive high-grade papillary urothelial carcinoma. Deep smooth muscle is present and extensively involved by invasive   carcinoma. Obtain PET scan  Discussed aggressive pathology and likely need for radical cystectomy ileal conduit formation with or without NAD chemo  Risks: I discussed all the risks, benefits, alternatives and possible complications or surgery as well as expectations and post-op recovery.   Discussed risks of bowel, urine leak, lymphocele, bowel injury, prolonged hospital stay, DVT, PE, MI, stroke, death, infections, blood loss     Discussed bladder preservation with TURBT, Chemo, XRT    Per their request, we also discussed what would happen if we did nothing    He will think about it        Prescriptions Ordered:  No orders of the defined types were placed in this encounter. Orders Placed:  Orders Placed This Encounter   Procedures    PET CT WHOLE BODY     Standing Status:   Future     Standing Expiration Date:   3/16/2024                Leti Segovia M.D, MD    No follow-ups on file.       Elliot Watson MD  [facility]

## 2023-03-16 NOTE — TELEPHONE ENCOUNTER
Patient scheduled for PET CT  at [facility] on 3/23/23.     Order mailed with instructions or given to the patient in the office

## 2023-03-20 ENCOUNTER — TELEPHONE (OUTPATIENT)
Dept: NEPHROLOGY | Age: 88
End: 2023-03-20

## 2023-03-20 NOTE — TELEPHONE ENCOUNTER
I can use the February labs but if he starts having any problems will need to repeat them prior to the appointment

## 2023-03-21 DIAGNOSIS — C67.2 MALIGNANT NEOPLASM OF LATERAL WALL OF URINARY BLADDER (HCC): Primary | ICD-10-CM

## 2023-03-21 NOTE — OP NOTE
Operative Note      Patient: Castillo Patino  YOB: 1935  MRN: [id number]    Date of Procedure: 3/9/2023    Pre-Op Diagnosis: Gross hematuria [R31.0]  Bladder tumor [D49.4]    Post-Op Diagnosis: Same       Procedure(s):  Cystoscopy Transurethral Resection of Bladder Tumor large > 5 cm    Surgeon(s):  Kasey Tilley MD    Assistant:   * No surgical staff found *    Anesthesia: General    Estimated Blood Loss (mL): Minimal    Complications: None    Specimens:   ID Type Source Tests Collected by Time Destination   A : bladder tissue Tissue Bladder SURGICAL PATHOLOGY Kasey Tilley MD 3/9/2023 1142        Implants:  * No implants in log *      Drains:   [REMOVED] Urinary Catheter 03/09/23 3 Way (Removed)   Catheter Indications Perioperative use for selected surgical procedures 03/09/23 1242   Site Assessment Pink 03/09/23 1242   Urine Color Cherry 03/09/23 1458   Urine Appearance Clear 03/09/23 1242   Collection Container Standard 03/09/23 1242   Securement Method Securing device (Describe) 03/09/23 1242   Catheter Best Practices  Drainage tube clipped to bed;Bag not on floor; Lack of dependent loop in tubing;Catheter secured to thigh;Drainage bag less than half full; Tamper seal intact; Bag below bladder 03/09/23 1242   Status Continuous bladder irrigation 03/09/23 1242       Findings: mass arising from the left posterior  and lateral wall, large left side, next to left UO     All treatment options were discussed. Risks, benefits, goals, alternatives, possible complications were discussed with patient. Patient elected for above mentioned procedures. Consent was signed. Patient elected to proceed. Details: Patient was brought back to operating room, laid in supine position. EPC cuffs were placed on and functioning prior to induction of anesthesia. Antibiotics were administered. GETA was administered.  Patient was positioned in dorsolithotomy position and genitals were prepped and draped in sterile

## 2023-03-23 ENCOUNTER — HOSPITAL ENCOUNTER (OUTPATIENT)
Dept: PET IMAGING | Age: 88
Discharge: HOME | End: 2023-03-23
Payer: MEDICARE

## 2023-03-23 DIAGNOSIS — C67.2 MALIGNANT NEOPLASM OF LATERAL WALL OF URINARY BLADDER (HCC): ICD-10-CM

## 2023-03-23 PROCEDURE — 78815 PET IMAGE W/CT SKULL-THIGH: CPT

## 2023-03-23 PROCEDURE — 3430000000 HC RX DIAGNOSTIC RADIOPHARMACEUTICAL: Performed by: UROLOGY

## 2023-03-23 PROCEDURE — A9552 F18 FDG: HCPCS | Performed by: UROLOGY

## 2023-03-23 RX ORDER — FLUDEOXYGLUCOSE F 18 200 MCI/ML
13.3 INJECTION, SOLUTION INTRAVENOUS
Status: COMPLETED | OUTPATIENT
Start: 2023-03-23 | End: 2023-03-23

## 2023-03-23 RX ADMIN — FLUDEOXYGLUCOSE F 18 13.3 MILLICURIE: 200 INJECTION, SOLUTION INTRAVENOUS at 09:34

## 2023-04-03 ENCOUNTER — OFFICE VISIT (OUTPATIENT)
Dept: NEPHROLOGY | Age: 88
End: 2023-04-03
Payer: MEDICARE

## 2023-04-03 VITALS
OXYGEN SATURATION: 96 % | HEART RATE: 60 BPM | BODY MASS INDEX: 20.65 KG/M2 | SYSTOLIC BLOOD PRESSURE: 126 MMHG | WEIGHT: 133.8 LBS | DIASTOLIC BLOOD PRESSURE: 58 MMHG

## 2023-04-03 DIAGNOSIS — N18.32 ANEMIA IN STAGE 3B CHRONIC KIDNEY DISEASE (HCC): Primary | ICD-10-CM

## 2023-04-03 DIAGNOSIS — D63.1 ANEMIA IN STAGE 3B CHRONIC KIDNEY DISEASE (HCC): Primary | ICD-10-CM

## 2023-04-03 DIAGNOSIS — N18.30 STAGE 3 CHRONIC KIDNEY DISEASE, UNSPECIFIED WHETHER STAGE 3A OR 3B CKD (HCC): ICD-10-CM

## 2023-04-03 PROCEDURE — 1123F ACP DISCUSS/DSCN MKR DOCD: CPT | Performed by: INTERNAL MEDICINE

## 2023-04-03 PROCEDURE — G8427 DOCREV CUR MEDS BY ELIG CLIN: HCPCS | Performed by: INTERNAL MEDICINE

## 2023-04-03 PROCEDURE — G8420 CALC BMI NORM PARAMETERS: HCPCS | Performed by: INTERNAL MEDICINE

## 2023-04-03 PROCEDURE — 99214 OFFICE O/P EST MOD 30 MIN: CPT | Performed by: INTERNAL MEDICINE

## 2023-04-03 PROCEDURE — 1036F TOBACCO NON-USER: CPT | Performed by: INTERNAL MEDICINE

## 2023-04-03 NOTE — PROGRESS NOTES
100 mcg q 2 weeks x 2  4. HTN: stable  5. Hx carotid artery stenosis s/p CEA B/L  6. Hx CVA   7. Bladder CA    Bloodwork and medications were reviewed and plan of care discussed with the patient. Return to clinic in 6 months  or sooner if the need arises.       Carmen Phipps DO  [facility]

## 2023-04-06 ENCOUNTER — OFFICE VISIT (OUTPATIENT)
Dept: UROLOGY | Age: 88
End: 2023-04-06

## 2023-04-06 VITALS
SYSTOLIC BLOOD PRESSURE: 100 MMHG | HEIGHT: 68 IN | BODY MASS INDEX: 20.16 KG/M2 | WEIGHT: 133 LBS | DIASTOLIC BLOOD PRESSURE: 48 MMHG

## 2023-04-06 DIAGNOSIS — C67.2 MALIGNANT NEOPLASM OF LATERAL WALL OF URINARY BLADDER (HCC): Primary | ICD-10-CM

## 2023-04-06 NOTE — PROGRESS NOTES
negative  Musculoskeletal: negative  Genitourinary: negative except for what is in HPI  Skin: negative   Neurological: negative  Hematological/Lymphatic: negative  Psychological: negative    Physical Exam:    This a 82 y.o. male   Vitals:    04/06/23 0923   BP: (!) 100/48     Constitutional: Patient in no acute distress;     Cystoscopy previously: mass arising from the left posterior lateral wall, large left side, looks like migh          Assessment and Plan      1. Malignant neoplasm of lateral wall of urinary bladder (HCC)             Cytology High grade urothelial carcinoma. S/pCystoscopy transurethral resection of bladder tumor; large left lateral wall  Path reviewed Invasive high-grade papillary urothelial carcinoma. Deep smooth muscle is present and extensively involved by invasive   carcinoma. Discussed aggressive pathology and likely need for radical cystectomy ileal conduit formation with or without NAD chemo  Risks: I discussed all the risks, benefits, alternatives and possible complications or surgery as well as expectations and post-op recovery. Discussed risks of bowel, urine leak, lymphocele, bowel injury, prolonged hospital stay, DVT, PE, MI, stroke, death, infections, blood loss     Discussed bladder preservation with TURBT, Chemo, XRT    PET scan negative. Per patient request he does to do nothing and just let the cancer take it's course. We also discussed what would happen if we did nothing. He will experience multiple bouts of hematuria, clot retention, and cancer pain. May need palliative care. Discussed follow up. He would like to follow up PRN. Prescriptions Ordered:  No orders of the defined types were placed in this encounter. Orders Placed:  No orders of the defined types were placed in this encounter. Lalita Wang MD    No follow-ups on file.       Andreea Perry MD  [facility]

## 2023-04-07 DIAGNOSIS — N18.30 ANEMIA OF CHRONIC RENAL FAILURE, STAGE 3 (MODERATE), UNSPECIFIED WHETHER STAGE 3A OR 3B CKD (HCC): ICD-10-CM

## 2023-04-07 DIAGNOSIS — N18.30 STAGE 3 CHRONIC KIDNEY DISEASE, UNSPECIFIED WHETHER STAGE 3A OR 3B CKD (HCC): ICD-10-CM

## 2023-04-07 DIAGNOSIS — D63.1 ANEMIA OF CHRONIC RENAL FAILURE, STAGE 3 (MODERATE), UNSPECIFIED WHETHER STAGE 3A OR 3B CKD (HCC): ICD-10-CM

## 2023-04-07 PROBLEM — N18.9 ANEMIA OF CHRONIC RENAL FAILURE: Status: ACTIVE | Noted: 2023-04-07

## 2023-04-13 ENCOUNTER — HOSPITAL ENCOUNTER (OUTPATIENT)
Dept: NURSING | Age: 88
Discharge: HOME | End: 2023-04-13
Payer: MEDICARE

## 2023-04-13 VITALS
OXYGEN SATURATION: 95 % | HEART RATE: 60 BPM | RESPIRATION RATE: 18 BRPM | SYSTOLIC BLOOD PRESSURE: 143 MMHG | DIASTOLIC BLOOD PRESSURE: 63 MMHG

## 2023-04-13 PROCEDURE — 96372 THER/PROPH/DIAG INJ SC/IM: CPT

## 2023-04-13 PROCEDURE — 6360000002 HC RX W HCPCS: Performed by: INTERNAL MEDICINE

## 2023-04-13 RX ADMIN — DARBEPOETIN ALFA 100 MCG: 100 INJECTION, SOLUTION INTRAVENOUS; SUBCUTANEOUS at 10:24

## 2023-04-13 NOTE — DISCHARGE INSTRUCTIONS
ACTIVITY:  Continue usual care with your doctor. Call your doctor immediately if any severe problems or go to the nearest emergency room. RETURN TO OUTPATIENT NURSING ON April 26 AT 9:30 FOR NEXT ARANESP INJECTION. I have been treated and hereby acknowledge receiving this instruction sheet.

## 2023-04-13 NOTE — PROGRESS NOTES
1000:  ARRIVES VIA WC FOR ARANESP INJECTION. WIFE ACCOMPANIES. PROCESS REVIEWED. QUESTIONS ANSWERED.  1025:  INJECTION COMPLETE. PT TO STAY A FEW MINUTES FOR OBS. 1045:  NO S/S REACTION NOTED. NO COMPLAINTS. PT DISCHARGED VIA WC IN CARE OF WIFE.   INSTRUCTIONS GIVEN ALONG WITH NEXT APPT.    __M__ Safety:       (Environmental)  [place] to environment  Ensure ID band is correct and in place/ allergy band as needed  Assess for fall risk  Initiate fall precautions as applicable (fall band, side rails, etc.)  Call light within reach  Bed in low position/ wheels locked    _M___ Pain:       Assess pain level and characteristics  Administer analgesics as ordered  Assess effectiveness of pain management and report to MD as needed    _M___ Knowledge Deficit:  Assess baseline knowledge  Provide teaching at level of understanding  Provide teaching via preferred learning method  Evaluate teaching effectiveness    _M___ Hemodynamic/Respiratory Status:       (Pre and Post Procedure Monitoring)  Assess/Monitor vital signs and LOC  Assess Baseline SpO2 prior to any sedation  Obtain weight/height  Assess vital signs/ LOC until patient meets discharge criteria  Monitor procedure site and notify MD of any issues    _

## 2023-04-26 ENCOUNTER — HOSPITAL ENCOUNTER (OUTPATIENT)
Dept: NURSING | Age: 88
Discharge: HOME | End: 2023-04-26
Payer: MEDICARE

## 2023-04-26 VITALS
HEART RATE: 66 BPM | OXYGEN SATURATION: 97 % | RESPIRATION RATE: 18 BRPM | DIASTOLIC BLOOD PRESSURE: 56 MMHG | SYSTOLIC BLOOD PRESSURE: 114 MMHG

## 2023-04-26 PROCEDURE — 96372 THER/PROPH/DIAG INJ SC/IM: CPT

## 2023-04-26 PROCEDURE — 6360000002 HC RX W HCPCS: Performed by: INTERNAL MEDICINE

## 2023-04-26 RX ADMIN — DARBEPOETIN ALFA 100 MCG: 100 INJECTION, SOLUTION INTRAVENOUS; SUBCUTANEOUS at 09:40

## 2023-04-26 NOTE — PROGRESS NOTES
0930 pt arrives via wheelchair with spouse for aranesp injection. Injection explained and questions answered. PT RIGHTS AND RESPONSIBILITIES OFFERED TO PT.   0940 injection given. Pt tolerated it well with no complaints. Pt discharged via wheelchair with instructions with no complaints.                __m__ Safety:       (Environmental)  [place] to environment  Ensure ID band is correct and in place/ allergy band as needed  Assess for fall risk  Initiate fall precautions as applicable (fall band, side rails, etc.)  Call light within reach  Bed in low position/ wheels locked    __m__ Pain:       Assess pain level and characteristics  Administer analgesics as ordered  Assess effectiveness of pain management and report to MD as needed    _m___ Knowledge Deficit:  Assess baseline knowledge  Provide teaching at level of understanding  Provide teaching via preferred learning method  Evaluate teaching effectiveness    __m__ Hemodynamic/Respiratory Status:       (Pre and Post Procedure Monitoring)  Assess/Monitor vital signs and LOC  Assess Baseline SpO2 prior to any sedation  Obtain weight/height  Assess vital signs/ LOC until patient meets discharge criteria  Monitor procedure site and notify MD of any issues

## 2023-05-09 ENCOUNTER — NURSE ONLY (OUTPATIENT)
Dept: LAB | Age: 88
End: 2023-05-09

## 2023-05-09 DIAGNOSIS — N18.32 ANEMIA IN STAGE 3B CHRONIC KIDNEY DISEASE (HCC): ICD-10-CM

## 2023-05-09 DIAGNOSIS — D63.1 ANEMIA IN STAGE 3B CHRONIC KIDNEY DISEASE (HCC): ICD-10-CM

## 2023-05-09 LAB
FERRITIN SERPL IA-MCNC: 16 NG/ML (ref 22–322)
HCT VFR BLD AUTO: 30.5 % (ref 42–52)
HGB BLD-MCNC: 8.1 GM/DL (ref 14–18)
IRON SATN MFR SERPL: 4 % (ref 20–50)
IRON SERPL-MCNC: 18 UG/DL (ref 65–195)
TIBC SERPL-MCNC: 419 UG/DL (ref 171–450)

## 2023-05-10 ENCOUNTER — TELEPHONE (OUTPATIENT)
Dept: NEPHROLOGY | Age: 88
End: 2023-05-10

## 2023-05-10 NOTE — TELEPHONE ENCOUNTER
----- Message from Soni Humphreys DO sent at 5/10/2023  3:15 PM EDT -----  Iron levels are extremely low, he also needs IV Iron in addition to the aranesp already ordered.    Arrange for injectafer if he is agreeable

## 2023-05-11 DIAGNOSIS — D63.1 ANEMIA IN STAGE 3B CHRONIC KIDNEY DISEASE (HCC): ICD-10-CM

## 2023-05-11 DIAGNOSIS — N18.32 ANEMIA IN STAGE 3B CHRONIC KIDNEY DISEASE (HCC): ICD-10-CM

## 2023-05-11 RX ORDER — SODIUM CHLORIDE 0.9 % (FLUSH) 0.9 %
5-40 SYRINGE (ML) INJECTION PRN
OUTPATIENT
Start: 2023-05-16

## 2023-05-11 RX ORDER — FERROUS SULFATE 325(65) MG
325 TABLET ORAL
Qty: 90 TABLET | Refills: 1 | Status: SHIPPED | OUTPATIENT
Start: 2023-05-11

## 2023-05-11 RX ORDER — DIPHENHYDRAMINE HYDROCHLORIDE 50 MG/ML
50 INJECTION INTRAMUSCULAR; INTRAVENOUS
OUTPATIENT
Start: 2023-05-16

## 2023-05-11 RX ORDER — EPINEPHRINE 1 MG/ML
0.3 INJECTION, SOLUTION, CONCENTRATE INTRAVENOUS PRN
OUTPATIENT
Start: 2023-05-16

## 2023-05-11 RX ORDER — HEPARIN SODIUM (PORCINE) LOCK FLUSH IV SOLN 100 UNIT/ML 100 UNIT/ML
500 SOLUTION INTRAVENOUS PRN
OUTPATIENT
Start: 2023-05-16

## 2023-05-11 RX ORDER — SODIUM CHLORIDE 9 MG/ML
INJECTION, SOLUTION INTRAVENOUS CONTINUOUS
OUTPATIENT
Start: 2023-05-16

## 2023-05-11 RX ORDER — SODIUM CHLORIDE 9 MG/ML
5-250 INJECTION, SOLUTION INTRAVENOUS PRN
OUTPATIENT
Start: 2023-05-16

## 2023-05-30 RX ORDER — ATORVASTATIN CALCIUM 40 MG/1
TABLET, FILM COATED ORAL
Qty: 90 TABLET | Refills: 1 | Status: SHIPPED | OUTPATIENT
Start: 2023-05-30

## 2023-05-30 RX ORDER — SODIUM BICARBONATE 650 MG/1
TABLET ORAL
Qty: 90 TABLET | Refills: 1 | Status: SHIPPED | OUTPATIENT
Start: 2023-05-30

## 2023-05-30 NOTE — TELEPHONE ENCOUNTER
Request sent from Jacey Cano [facility] for refills of lipitor 40 mg qd and sodium bicarb 650 mg qd. Last seen 12/27/22, no future appt scheduled. Meds verified, orders pended.
